# Patient Record
Sex: FEMALE | Race: WHITE | Employment: OTHER | URBAN - METROPOLITAN AREA
[De-identification: names, ages, dates, MRNs, and addresses within clinical notes are randomized per-mention and may not be internally consistent; named-entity substitution may affect disease eponyms.]

---

## 2018-03-09 PROBLEM — C34.92 ADENOCARCINOMA, LUNG, LEFT (HCC): Status: ACTIVE | Noted: 2018-03-09

## 2018-07-05 ENCOUNTER — HOSPITAL ENCOUNTER (OUTPATIENT)
Dept: ULTRASOUND IMAGING | Age: 78
Discharge: HOME | End: 2018-07-07
Payer: MEDICARE

## 2018-07-05 ENCOUNTER — TELEPHONE (OUTPATIENT)
Dept: FAMILY MEDICINE CLINIC | Age: 78
End: 2018-07-05

## 2018-07-05 ENCOUNTER — HOSPITAL ENCOUNTER (OUTPATIENT)
Age: 78
Discharge: HOME | End: 2018-07-07
Payer: MEDICARE

## 2018-07-05 DIAGNOSIS — M79.89 SWELLING OF LIMB: ICD-10-CM

## 2018-07-05 DIAGNOSIS — M79.89 LEFT LEG SWELLING: Primary | ICD-10-CM

## 2018-07-05 PROCEDURE — 93971 EXTREMITY STUDY: CPT

## 2018-07-06 ENCOUNTER — OFFICE VISIT (OUTPATIENT)
Dept: FAMILY MEDICINE CLINIC | Age: 78
End: 2018-07-06
Payer: MEDICARE

## 2018-07-06 VITALS
SYSTOLIC BLOOD PRESSURE: 108 MMHG | OXYGEN SATURATION: 96 % | RESPIRATION RATE: 16 BRPM | BODY MASS INDEX: 30.51 KG/M2 | TEMPERATURE: 97.8 F | DIASTOLIC BLOOD PRESSURE: 70 MMHG | HEIGHT: 63 IN | WEIGHT: 172.2 LBS | HEART RATE: 96 BPM

## 2018-07-06 DIAGNOSIS — R60.0 PEDAL EDEMA: Primary | ICD-10-CM

## 2018-07-06 DIAGNOSIS — R79.89 ELEVATED TSH: ICD-10-CM

## 2018-07-06 LAB
ALBUMIN SERPL-MCNC: NORMAL G/DL
ALP BLD-CCNC: NORMAL U/L
ALT SERPL-CCNC: NORMAL U/L
ANION GAP SERPL CALCULATED.3IONS-SCNC: NORMAL MMOL/L
AST SERPL-CCNC: NORMAL U/L
BASOPHILS ABSOLUTE: NORMAL /ΜL
BASOPHILS RELATIVE PERCENT: NORMAL %
BILIRUB SERPL-MCNC: NORMAL MG/DL (ref 0.1–1.4)
BUN BLDV-MCNC: NORMAL MG/DL
CALCIUM SERPL-MCNC: NORMAL MG/DL
CHLORIDE BLD-SCNC: NORMAL MMOL/L
CO2: NORMAL MMOL/L
CREAT SERPL-MCNC: NORMAL MG/DL
EOSINOPHILS ABSOLUTE: NORMAL /ΜL
EOSINOPHILS RELATIVE PERCENT: NORMAL %
GFR CALCULATED: NORMAL
GLUCOSE BLD-MCNC: 94 MG/DL
HCT VFR BLD CALC: 41.1 % (ref 36–46)
HEMOGLOBIN: 13.6 G/DL (ref 12–16)
LYMPHOCYTES ABSOLUTE: NORMAL /ΜL
LYMPHOCYTES RELATIVE PERCENT: NORMAL %
MCH RBC QN AUTO: NORMAL PG
MCHC RBC AUTO-ENTMCNC: NORMAL G/DL
MCV RBC AUTO: NORMAL FL
MONOCYTES ABSOLUTE: NORMAL /ΜL
MONOCYTES RELATIVE PERCENT: NORMAL %
NEUTROPHILS ABSOLUTE: NORMAL /ΜL
NEUTROPHILS RELATIVE PERCENT: NORMAL %
PDW BLD-RTO: NORMAL %
PLATELET # BLD: NORMAL K/ΜL
PMV BLD AUTO: NORMAL FL
POTASSIUM SERPL-SCNC: NORMAL MMOL/L
RBC # BLD: NORMAL 10^6/ΜL
SODIUM BLD-SCNC: NORMAL MMOL/L
TOTAL PROTEIN: NORMAL
TSH SERPL DL<=0.05 MIU/L-ACNC: 0.57 UIU/ML
WBC # BLD: NORMAL 10^3/ML

## 2018-07-06 PROCEDURE — 99213 OFFICE O/P EST LOW 20 MIN: CPT | Performed by: FAMILY MEDICINE

## 2018-07-06 RX ORDER — FUROSEMIDE 20 MG/1
20 TABLET ORAL DAILY
Qty: 5 TABLET | Refills: 0 | Status: SHIPPED | OUTPATIENT
Start: 2018-07-06 | End: 2018-09-24 | Stop reason: SDUPTHER

## 2018-07-06 NOTE — PROGRESS NOTES
7/8/2018    Chief Complaint   Patient presents with    Swelling     Pt having swelling in both lower legs/feet, especially Lt  x last few days, also swelling in Rt hand    Results     Discuss results of US of Lt lower extremity     Other     Requesting letter for renewal of Handicap Placard          Subjective    Has had increased swelling in legs L >R  Has us yesterday negative for  dvt    Has been eating differently in the past f ew mos  Went to dinner on sat  422 in [facility]. [address] with clam sauce  Balsamic,   Graduation party    Yoana Ibrahim doing ok off of it     Vitals:    07/06/18 1206   BP: 108/70   Pulse: 96   Resp: 16   Temp: 97.8 °F (36.6 °C)   SpO2: 96%       1. Pedal edema    - CBC Auto Differential; Future  - Comprehensive Metabolic Panel; Future  - TSH without Reflex; Future  - furosemide (LASIX) 20 MG tablet; Take 1 tablet by mouth daily For 3 days  Dispense: 5 tablet; Refill: 0  - Handicap Placard MISC; by Does not apply route Patient cannot walk 200 ft without stopping Duration: 5 years  Dispense: 1 each; Refill: 0    2. Elevated TSH    - TSH without Reflex;  Future          Goals      Keep scheduled appointments         Goals   Review Of Systems    General:  No weight change no malaise no fatigue no change in appetite no sleep disturbance no fever/chills no night sweats  Skin:                 no abnormal pigmentation, no rash, no scaling,no itching,no Masses,no  hair or nail changes  Eyes:               no blurring, no diplopia, no  eye pain no glaucoma no cataracts  ENT:                 no hearing loss,no  Tinnitus,no  Vertigo,no osebleed, no nasal congestion, no rhinorrhea,  no sore throat no jaw pain no hoarseness no bleeding  Gums   ___ dentures  Neck:                no node tenderness , not rigid, no masses   Respiratory:            no cough, no sputum,  No coughing blood, no pleuritic , no chest pain, no dyspnea,  no wheezing  Cardiovascular:         no angina,  No chest pain  No syncope  ++o pedal edema , no orthopnea, no PND, no palpitations, no claudication  Gastrointestinal  no nausea, no vomiting, no heartburn, no diarrhea, no constipation, no bloating,  no abdominal pain, no rectal pain, no bleeding no hemorrhoids, no hernia  Genitourinary:           no urinary urgency, no frequency, no dysuria, no nocturia, hesitancy, no  Incontinence, no bleeding, no stones  Musculoskeletal:         no arthritis, no  arthralgia, no myalgia,no  weakness,  no morning stiffness, no joint swelling  Neurologic:                no paralysis, no resis,no  paresthesia, no seizures,no  tremors,no headaches, no tumors , no stroke, no speech issues,  No incoordination, no head trauma, no memory loss/concentration  Hematologic:            no anemia, no abnormal bleeding/bruising, no fever, no chills,no night sweats, no wollen glands, no unexplained weight loss  Endocrine:        no heat or cold intolerance and no polyphagia, polydipsia,  or polyuria  Psych:            No depression no anxiety, no suicidal or homicidal thoughts, no irritability, no decreased energy, no trouble falling asleep, no early awakening , no trouble concentrating             Objective:    Family History   Problem Relation Age of Onset    Cancer Sister         pancreatic    Cancer Brother         prostate    Cancer Brother         pancreatic    Cancer Sister 68        breast       Past Medical History:   Diagnosis Date    Anesthesia     wakes up very slowly and has lasting drowsiness    Breast [facility] ([facility]     breast    COPD with exacerbation ([facility] [address] 11/14/2015    Glaucoma     History of colon polyps     Hyperlipidemia     Hypothyroidism     Osteoarthritis     Pneumonia        Social History     Social History    Marital status:       Spouse name: N/A    Number of children: N/A    Years of education: N/A     Occupational History    homemaker      Social History Main Topics    Smoking status: Former Smoker

## 2018-07-09 ENCOUNTER — TELEPHONE (OUTPATIENT)
Dept: FAMILY MEDICINE CLINIC | Age: 78
End: 2018-07-09

## 2018-07-09 NOTE — TELEPHONE ENCOUNTER
Reyes Fu called in with update on her leg. She said she took 3 water pills. Her weights were Saturday 171, Sunday 169 and today 168. She said swelling is completely down in am.  She said she went to store today and a little increase in swelling but not too bad. She has 2 Lasix left and you told her to call after she took 3 pills.

## 2018-07-09 NOTE — TELEPHONE ENCOUNTER
Spoke to Carlo Santiago and told her not to take any more Lasix and to watch salt intake and she understood

## 2018-07-16 ENCOUNTER — OFFICE VISIT (OUTPATIENT)
Dept: FAMILY MEDICINE CLINIC | Age: 78
End: 2018-07-16
Payer: MEDICARE

## 2018-07-16 ENCOUNTER — HOSPITAL ENCOUNTER (OUTPATIENT)
Dept: GENERAL RADIOLOGY | Age: 78
Discharge: HOME | End: 2018-07-18
Payer: MEDICARE

## 2018-07-16 ENCOUNTER — HOSPITAL ENCOUNTER (OUTPATIENT)
Age: 78
Discharge: HOME | End: 2018-07-18
Payer: MEDICARE

## 2018-07-16 VITALS
BODY MASS INDEX: 29.57 KG/M2 | HEIGHT: 64 IN | SYSTOLIC BLOOD PRESSURE: 134 MMHG | TEMPERATURE: 97.5 F | WEIGHT: 173.2 LBS | OXYGEN SATURATION: 98 % | HEART RATE: 92 BPM | DIASTOLIC BLOOD PRESSURE: 76 MMHG

## 2018-07-16 DIAGNOSIS — R60.0 PEDAL EDEMA: Primary | ICD-10-CM

## 2018-07-16 DIAGNOSIS — R60.0 PEDAL EDEMA: ICD-10-CM

## 2018-07-16 PROCEDURE — 99213 OFFICE O/P EST LOW 20 MIN: CPT | Performed by: FAMILY MEDICINE

## 2018-07-16 PROCEDURE — 71046 X-RAY EXAM CHEST 2 VIEWS: CPT

## 2018-07-16 RX ORDER — FUROSEMIDE 20 MG/1
20 TABLET ORAL 2 TIMES DAILY
Qty: 60 TABLET | Refills: 3 | Status: SHIPPED | OUTPATIENT
Start: 2018-07-16 | End: 2019-05-20

## 2018-07-16 NOTE — PROGRESS NOTES
no bleeding, no stones  Musculoskeletal:         no arthritis, no  arthralgia, no myalgia,no  weakness,  no morning stiffness, no joint swelling  Neurologic:                no paralysis, no resis,no  paresthesia, no seizures,no  tremors,no headaches, no tumors , no stroke, no speech issues,  No incoordination, no head trauma, no memory loss/concentration  Hematologic:            no anemia, no abnormal bleeding/bruising, no fever, no chills,no night sweats, no wollen glands, no unexplained weight loss  Endocrine:        no heat or cold intolerance and no polyphagia, polydipsia,  or polyuria  Psych:            No depression no anxiety, no suicidal or homicidal thoughts, no irritability, no decreased energy, no trouble falling asleep, no early awakening , no trouble concentrating             Objective:    Family History   Problem Relation Age of Onset    Cancer Sister         pancreatic    Cancer Brother         prostate    Cancer Brother         pancreatic    Cancer Sister 68        breast       Past Medical History:   Diagnosis Date    Anesthesia     wakes up very slowly and has lasting drowsiness    Breast cancer ([facility]     breast    COPD with exacerbation ([facility] 11/14/2015    Glaucoma     History of colon polyps     Hyperlipidemia     Hypothyroidism     Osteoarthritis     Pneumonia        Social History     Social History    Marital status:       Spouse name: N/A    Number of children: N/A    Years of education: N/A     Occupational History    homemaker      Social History Main Topics    Smoking status: Former Smoker     Packs/day: 0.50     Years: 45.00     Types: Cigarettes     Quit date: 1/1/2005    Smokeless tobacco: Never Used    Alcohol use No      Comment: socially    Drug use: No    Sexual activity: Not Currently     Other Topics Concern    Not on file     Social History Narrative    No narrative on file           Scheduled Meds:  Current Outpatient Prescriptions   Medication Sig Dispense Refill    furosemide (LASIX) 20 MG tablet Take 1 tablet by mouth 2 times daily 60 tablet 3    Handicap Placard MISC by Does not apply route Patient cannot walk 200 ft without stopping Duration: 5 years 1 each 0    omeprazole (PRILOSEC) 20 MG delayed release capsule Take 20 mg by mouth daily      simvastatin (ZOCOR) 40 MG tablet Take 1 tablet by mouth nightly 90 tablet 1    levothyroxine (SYNTHROID) 125 MCG tablet Take 1 tablet by mouth Daily 90 tablet 1    brimonidine (ALPHAGAN) 0.2 % ophthalmic solution Place 1 drop into the right eye 2 times daily       latanoprost (XALATAN) 0.005 % ophthalmic solution Place 1 drop into the right eye nightly       furosemide (LASIX) 20 MG tablet Take 1 tablet by mouth daily For 3 days 5 tablet 0    doxycycline hyclate (VIBRAMYCIN) 100 MG capsule TAKE ONE CAPSULE BY MOUTH WITH WATER TWO TIMES A DAY  0    mometasone-formoterol (DULERA) 200-5 MCG/ACT inhaler Inhale 2 puffs into the lungs 2 times daily 3 Inhaler 1    albuterol sulfate HFA (PROAIR HFA) 108 (90 Base) MCG/ACT inhaler Inhale 2 puffs into the lungs every 6 hours as needed for Wheezing 1 Inhaler 5    dorzolamide (TRUSOPT) 2 % ophthalmic solution Use 1 Drop in the right eye twice daily. No current facility-administered medications for this visit. Wt Readings from Last 3 Encounters:   07/16/18 173 lb 3.2 oz (78.6 kg)   07/06/18 172 lb 3.2 oz (78.1 kg)   03/09/18 166 lb (75.3 kg)         Vitals:    07/16/18 1426   BP: 134/76   Pulse: 92   Temp: 97.5 °F (36.4 °C)   SpO2: 98%       Physical Exam:    General: No obesity, no cachexia  Skin:    Warm and dry. Not pale, not flushed , no lesions, No rash , no bruises  HEENT:   PERRLA, EOMI. Conjunctiva clear, no  Drainage, no jaundice, ext canals normal ,no cerumen impaction,TM's normal ,not injected ,no fluid . Normal nasal mucosa not boggy , not inflamed .  Septum normal, turbinates not swollen, oral mucosa moist , lips normal, teeth normal,

## 2018-07-17 DIAGNOSIS — E78.2 MIXED HYPERLIPIDEMIA: ICD-10-CM

## 2018-07-17 DIAGNOSIS — E03.9 ACQUIRED HYPOTHYROIDISM: ICD-10-CM

## 2018-07-20 ENCOUNTER — HOSPITAL ENCOUNTER (OUTPATIENT)
Dept: CARDIOLOGY | Age: 78
Discharge: HOME | End: 2018-07-20
Payer: MEDICARE

## 2018-07-20 DIAGNOSIS — R60.0 PEDAL EDEMA: ICD-10-CM

## 2018-07-20 LAB
LV EF: 60 %
LVEF MODALITY: NORMAL

## 2018-07-20 PROCEDURE — 93306 TTE W/DOPPLER COMPLETE: CPT | Performed by: PSYCHIATRY & NEUROLOGY

## 2018-07-23 RX ORDER — ALBUTEROL SULFATE 90 UG/1
2 AEROSOL, METERED RESPIRATORY (INHALATION) EVERY 6 HOURS PRN
Qty: 1 INHALER | Refills: 5
Start: 2018-07-23 | End: 2019-05-20 | Stop reason: SDUPTHER

## 2018-07-27 ENCOUNTER — TELEPHONE (OUTPATIENT)
Dept: FAMILY MEDICINE CLINIC | Age: 78
End: 2018-07-27

## 2018-07-27 NOTE — TELEPHONE ENCOUNTER
Delio Brooks called with update: Legs are better when she does what you told her to - elevate, wear compression hose, alow salt. Really good in morning, by end of night swell again but goes down when she puts them up. Color is still a little pink but better than they were.

## 2018-08-10 ENCOUNTER — NURSE ONLY (OUTPATIENT)
Dept: FAMILY MEDICINE CLINIC | Age: 78
End: 2018-08-10
Payer: MEDICARE

## 2018-08-10 VITALS
HEART RATE: 90 BPM | BODY MASS INDEX: 29.89 KG/M2 | SYSTOLIC BLOOD PRESSURE: 102 MMHG | OXYGEN SATURATION: 97 % | DIASTOLIC BLOOD PRESSURE: 60 MMHG | WEIGHT: 171.4 LBS | TEMPERATURE: 98 F

## 2018-08-10 DIAGNOSIS — E03.9 ACQUIRED HYPOTHYROIDISM: ICD-10-CM

## 2018-08-10 DIAGNOSIS — R60.0 PEDAL EDEMA: Primary | ICD-10-CM

## 2018-08-10 DIAGNOSIS — I73.00 RAYNAUD'S PHENOMENON WITHOUT GANGRENE: ICD-10-CM

## 2018-08-10 DIAGNOSIS — R06.02 SOB (SHORTNESS OF BREATH): ICD-10-CM

## 2018-08-10 PROCEDURE — 99213 OFFICE O/P EST LOW 20 MIN: CPT | Performed by: FAMILY MEDICINE

## 2018-08-10 RX ORDER — LEVOTHYROXINE SODIUM 0.12 MG/1
125 TABLET ORAL DAILY
Qty: 90 TABLET | Refills: 1 | Status: SHIPPED | OUTPATIENT
Start: 2018-08-10 | End: 2019-01-30 | Stop reason: SDUPTHER

## 2018-08-13 LAB — B-TYPE NATRIURETIC PEPTIDE: 31 PG/ML

## 2018-08-16 DIAGNOSIS — R06.02 SOB (SHORTNESS OF BREATH): ICD-10-CM

## 2018-08-16 DIAGNOSIS — R60.0 PEDAL EDEMA: ICD-10-CM

## 2018-08-20 NOTE — PROGRESS NOTES
 Marital status:      Spouse name: N/A    Number of children: N/A    Years of education: N/A     Occupational History    homemaker      Social History Main Topics    Smoking status: Former Smoker     Packs/day: 0.50     Years: 45.00     Types: Cigarettes     Quit date: 1/1/2005    Smokeless tobacco: Never Used    Alcohol use No      Comment: socially    Drug use: No    Sexual activity: Not Currently     Other Topics Concern    Not on file     Social History Narrative    No narrative on file           Scheduled Meds:  Current Outpatient Prescriptions   Medication Sig Dispense Refill    levothyroxine (SYNTHROID) 125 MCG tablet Take 1 tablet by mouth Daily 90 tablet 1    albuterol sulfate HFA (PROAIR HFA) 108 (90 Base) MCG/ACT inhaler Inhale 2 puffs into the lungs every 6 hours as needed for Wheezing 1 Inhaler 5    furosemide (LASIX) 20 MG tablet Take 1 tablet by mouth 2 times daily 60 tablet 3    furosemide (LASIX) 20 MG tablet Take 1 tablet by mouth daily For 3 days 5 tablet 0    Handicap Placard MISC by Does not apply route Patient cannot walk 200 ft without stopping Duration: 5 years 1 each 0    omeprazole (PRILOSEC) 20 MG delayed release capsule Take 20 mg by mouth daily      simvastatin (ZOCOR) 40 MG tablet Take 1 tablet by mouth nightly 90 tablet 1    doxycycline hyclate (VIBRAMYCIN) 100 MG capsule TAKE ONE CAPSULE BY MOUTH WITH WATER TWO TIMES A DAY  0    dorzolamide (TRUSOPT) 2 % ophthalmic solution Use 1 Drop in the right eye twice daily.  brimonidine (ALPHAGAN) 0.2 % ophthalmic solution Place 1 drop into the right eye 2 times daily       latanoprost (XALATAN) 0.005 % ophthalmic solution Place 1 drop into the right eye nightly        No current facility-administered medications for this visit.                 Wt Readings from Last 3 Encounters:   08/10/18 171 lb 6.4 oz (77.7 kg)   07/16/18 173 lb 3.2 oz (78.6 kg)   07/06/18 172 lb 3.2 oz (78.1 kg)         Vitals:    08/10/18

## 2018-09-11 LAB
ALBUMIN SERPL-MCNC: NORMAL G/DL
ALP BLD-CCNC: NORMAL U/L
ALT SERPL-CCNC: NORMAL U/L
ANION GAP SERPL CALCULATED.3IONS-SCNC: NORMAL MMOL/L
AST SERPL-CCNC: NORMAL U/L
BASOPHILS ABSOLUTE: NORMAL /ΜL
BASOPHILS RELATIVE PERCENT: NORMAL %
BILIRUB SERPL-MCNC: NORMAL MG/DL (ref 0.1–1.4)
BUN BLDV-MCNC: NORMAL MG/DL
CALCIUM SERPL-MCNC: NORMAL MG/DL
CHLORIDE BLD-SCNC: NORMAL MMOL/L
CHOLESTEROL, TOTAL: 160 MG/DL
CHOLESTEROL/HDL RATIO: 3.5
CO2: NORMAL MMOL/L
CREAT SERPL-MCNC: NORMAL MG/DL
EOSINOPHILS ABSOLUTE: NORMAL /ΜL
EOSINOPHILS RELATIVE PERCENT: NORMAL %
GFR CALCULATED: NORMAL
GLUCOSE BLD-MCNC: 104 MG/DL
HCT VFR BLD CALC: 43.4 % (ref 36–46)
HDLC SERPL-MCNC: 46 MG/DL (ref 35–70)
HEMOGLOBIN: 14 G/DL (ref 12–16)
LDL CHOLESTEROL CALCULATED: 79 MG/DL (ref 0–160)
LYMPHOCYTES ABSOLUTE: NORMAL /ΜL
LYMPHOCYTES RELATIVE PERCENT: NORMAL %
MCH RBC QN AUTO: NORMAL PG
MCHC RBC AUTO-ENTMCNC: NORMAL G/DL
MCV RBC AUTO: NORMAL FL
MONOCYTES ABSOLUTE: NORMAL /ΜL
MONOCYTES RELATIVE PERCENT: NORMAL %
NEUTROPHILS ABSOLUTE: NORMAL /ΜL
NEUTROPHILS RELATIVE PERCENT: NORMAL %
PDW BLD-RTO: NORMAL %
PLATELET # BLD: NORMAL K/ΜL
PMV BLD AUTO: NORMAL FL
POTASSIUM SERPL-SCNC: NORMAL MMOL/L
RBC # BLD: NORMAL 10^6/ΜL
SODIUM BLD-SCNC: NORMAL MMOL/L
TOTAL PROTEIN: NORMAL
TRIGL SERPL-MCNC: 173 MG/DL
TSH SERPL DL<=0.05 MIU/L-ACNC: 0.9 UIU/ML
VLDLC SERPL CALC-MCNC: NORMAL MG/DL
WBC # BLD: NORMAL 10^3/ML

## 2018-09-17 ENCOUNTER — OFFICE VISIT (OUTPATIENT)
Dept: VASCULAR SURGERY | Age: 78
End: 2018-09-17
Payer: MEDICARE

## 2018-09-17 DIAGNOSIS — I87.2 VENOUS INSUFFICIENCY OF BOTH LOWER EXTREMITIES: Primary | ICD-10-CM

## 2018-09-17 PROCEDURE — 99204 OFFICE O/P NEW MOD 45 MIN: CPT | Performed by: SURGERY

## 2018-09-17 NOTE — PROGRESS NOTES
Vascular Surgery Outpatient Consultation    Reason for Consult:  LE Edema    PCP : Avlina Bazzi MD  Podiatrist :     HISTORY OF PRESENT ILLNESS:    The patient is a [de-identified] y.o. who presents with complaints of LE edema sine 7/2018. It has been progressively getting worse. The symptoms are left greater than right. Symptoms include pain, aching, fatigue and edema typically worse as on feet more, at end of the day. Pain at its worst is a 4/10. She has taken alleve which does not help much. The patient has been using compression stockings - knee high - for a couple weeks per her pcp. She had issues with swelling just above her stockings so she stopped wearing as much. She has no previos hx of DVT and had recent us of L LE which had no evidence of DVT. She is taking lasix and has noticed some improvement. She has a hx of lung ca and lung resection left chest at [facility]. She also has hx of breast ca.        ROS : All others Negative if blank [], Positive if [x]  General Urinary   [] Fevers [] Hematuria   [] Chills [] Dysuria   [] Weight Loss Vascular   Skin [] Claudication   [] Tissue Loss [] Rest Pain   Eyes Neurologic   [x] Wears Glasses/Contacts [] Stroke/TIA   [] Vision Changes [] Focal weakness   Respiratory [] Slurred Speech    [] Shortness of breath ENT   Cardiovascular [] Difficulty swallowing   [] Chest Pain Endocrine    [x] Shortness of breath with exertion [] Increased Thirst   Gastrointestinal    [] Abdominal Pain    [] Melena   [] Hematochezia         Past Medical History:        Diagnosis Date    Anesthesia     wakes up very slowly and has lasting drowsiness    Breast cancer ([address]     breast    COPD with exacerbation ([address] 11/14/2015    Glaucoma     History of colon polyps     Hyperlipidemia     Hypothyroidism     Osteoarthritis     Pneumonia      Past Surgical History:        Procedure Laterality Date    APPENDECTOMY      BREAST LUMPECTOMY  9/25/2012    Right    BREAST SURGERY Sexual activity: Not Currently     Other Topics Concern    Not on file     Social History Narrative    No narrative on file        Family History   Problem Relation Age of Onset    Cancer Sister         pancreatic    Cancer Brother         prostate    Cancer Brother         pancreatic    Cancer Sister 68        breast   Mom had varicose veins    Labs  Lab Results   Component Value Date    WBC 5.2 08/30/2016    HGB 13.6 07/06/2018    HCT 41.1 07/06/2018     08/30/2016    PROTIME 11.4 08/30/2016    INR 1.1 08/30/2016    APTT 30.2 11/13/2015    K 4.8 04/02/2016    BUN 14 04/02/2016    CREATININE 0.6 04/02/2016     PHYSICAL EXAM:    CONSTITUTIONAL:   Awake, alert, cooperative  PSYCHIATRIC :  Oriented to time, place and person     Appropriate insight to disease process  EYES: Lids and lashes normal  ENT:  External ears and nose without lesions   Hearing deficits on right  NECK: Supple, symmetrical, trachea midline   Thyroid goiter not appreciated   Carotid bruit not noted  LUNGS:  No increased work of breathing                 Clear to auscultation  CARDIOVASCULAR:  regular rate and rhythm   ABDOMEN:  soft, non-distended, non-tender   Hernias not noted   Aorta is not palpable  Lymphatics : Cervical lymphadenopathy not noted     Femoral lymphadenopathy notnoted  SKIN:   Normal skin color   Texture and turgor normal, no induration  EXTREMITIES:   R UE 5/5 strength   No cyanosis noted in nail beds  L UE 5/5 strength   No cyanosis noted in nail beds  R LE Edema present   Varicose veins absent   L LE Edema present   Varicose veins absent   R femoral 2+ L femoral 2+   R dorsalis pedis 2+ L dorsalis pedis 2+   R posterior tibial 1+ L posterior tibial 1+     RADIOLOGY:    A/PSymptomatic Venous Insufficiency of L > R LE   · Etiology is likely associated with venous reflux  · I explained to them the pathophysiology of venous reflux and the symptoms associated with it including swelling, pain, edema, heaviness, and even ulceration  · Elevate Bilateral LE while in bed or sitting  · Compression stockings pantyhose high 20-30 mm hg wear daily - Script given  · Discussed how this is the first line of therapy  · They understand even if surgical intervention was felt to be appropriate in the future they would need to wear compression stockings lifetime  · F/U as outpatient in 2 months  · Call if patient develops worsening of swelling or wounds  · All ?s answered    Kasey Artis

## 2018-09-17 NOTE — PATIENT INSTRUCTIONS
stopped after 15 minutes, apply pressure again for another 15 minutes. You can repeat this up to 3 times for a total of 45 minutes. If you have a blood clot in a varicose vein, you may have tenderness and swelling over the vein. The vein may feel firm. Be sure to call your doctor right away if you have these symptoms. If your doctor has told you how to care for the clot, follow his or her instructions. Care may include the following:  · Prop up your leg and apply heat with a warm, damp cloth or a heating pad set on low (put a towel or cloth between your leg and the heating pad to prevent burns). · Ask your doctor if you can take an over-the-counter pain medicine, such as acetaminophen (Tylenol), ibuprofen (Advil, Motrin), or naproxen (Aleve). Be safe with medicines. Read and follow all instructions on the label. When should you call for help? Call 911 anytime you think you may need emergency care. For example, call if:    · You have sudden chest pain and shortness of breath, or you cough up blood.    Call your doctor now or seek immediate medical care if:    · You have signs of a blood clot, such as:  ¨ Pain in your calf, back of the knee, thigh, or groin. ¨ Redness and swelling in your leg or groin.     · A varicose vein begins to bleed and you cannot stop it.     · You have a tender lump in your leg.     · You get an open sore.    Watch closely for changes in your health, and be sure to contact your doctor if:    · Your varicose vein symptoms do not improve with home treatment. Where can you learn more? Go to https://Netli.[employer]row[employer]ube. org and sign in to your [employer] account. Enter W473 in the [employer] box to learn more about \"Varicose Veins: Care Instructions. \"     If you do not have an account, please click on the \"Sign Up Now\" link. Current as of: November 21, 2017  Content Version: 11.7  © 2606-4494 [employer].  Care instructions adapted under license by [facility]. If you have questions about a medical condition or this instruction, always ask your healthcare professional. [place] [address] any warranty or liability for your use of this information.

## 2018-09-24 ENCOUNTER — OFFICE VISIT (OUTPATIENT)
Dept: FAMILY MEDICINE CLINIC | Age: 78
End: 2018-09-24
Payer: MEDICARE

## 2018-09-24 VITALS
HEART RATE: 100 BPM | OXYGEN SATURATION: 97 % | DIASTOLIC BLOOD PRESSURE: 68 MMHG | SYSTOLIC BLOOD PRESSURE: 120 MMHG | TEMPERATURE: 97.9 F | WEIGHT: 170.2 LBS | RESPIRATION RATE: 16 BRPM | BODY MASS INDEX: 29.06 KG/M2 | HEIGHT: 64 IN

## 2018-09-24 DIAGNOSIS — Z23 FLU VACCINE NEED: Primary | ICD-10-CM

## 2018-09-24 DIAGNOSIS — E78.2 MIXED HYPERLIPIDEMIA: ICD-10-CM

## 2018-09-24 DIAGNOSIS — Z85.3 PERSONAL HISTORY OF BREAST CANCER: ICD-10-CM

## 2018-09-24 DIAGNOSIS — Z23 NEED FOR PROPHYLACTIC VACCINATION AND INOCULATION AGAINST VARICELLA: ICD-10-CM

## 2018-09-24 DIAGNOSIS — H40.9 GLAUCOMA OF BOTH EYES, UNSPECIFIED GLAUCOMA TYPE: ICD-10-CM

## 2018-09-24 DIAGNOSIS — Z00.00 ROUTINE GENERAL MEDICAL EXAMINATION AT A HEALTH CARE FACILITY: ICD-10-CM

## 2018-09-24 DIAGNOSIS — E03.9 ACQUIRED HYPOTHYROIDISM: ICD-10-CM

## 2018-09-24 DIAGNOSIS — C34.92 ADENOCARCINOMA, LUNG, LEFT (HCC): ICD-10-CM

## 2018-09-24 PROCEDURE — G0008 ADMIN INFLUENZA VIRUS VAC: HCPCS | Performed by: FAMILY MEDICINE

## 2018-09-24 PROCEDURE — G0439 PPPS, SUBSEQ VISIT: HCPCS | Performed by: FAMILY MEDICINE

## 2018-09-24 PROCEDURE — 90662 IIV NO PRSV INCREASED AG IM: CPT | Performed by: FAMILY MEDICINE

## 2018-09-24 RX ORDER — OMEPRAZOLE 20 MG/1
40 CAPSULE, DELAYED RELEASE ORAL DAILY
COMMUNITY

## 2018-09-24 RX ORDER — SIMVASTATIN 40 MG
40 TABLET ORAL NIGHTLY
Qty: 90 TABLET | Refills: 1 | Status: SHIPPED | OUTPATIENT
Start: 2018-09-24 | End: 2019-03-25 | Stop reason: SDUPTHER

## 2018-09-24 ASSESSMENT — LIFESTYLE VARIABLES
AUDIT-C TOTAL SCORE: 1
HAS A RELATIVE, FRIEND, DOCTOR, OR ANOTHER HEALTH PROFESSIONAL EXPRESSED CONCERN ABOUT YOUR DRINKING OR SUGGESTED YOU CUT DOWN: 0
HOW MANY STANDARD DRINKS CONTAINING ALCOHOL DO YOU HAVE ON A TYPICAL DAY: 0
HOW OFTEN DO YOU HAVE SIX OR MORE DRINKS ON ONE OCCASION: 0
AUDIT TOTAL SCORE: 1
HOW OFTEN DURING THE LAST YEAR HAVE YOU HAD A FEELING OF GUILT OR REMORSE AFTER DRINKING: 0
HOW OFTEN DURING THE LAST YEAR HAVE YOU FAILED TO DO WHAT WAS NORMALLY EXPECTED FROM YOU BECAUSE OF DRINKING: 0
HOW OFTEN DURING THE LAST YEAR HAVE YOU NEEDED AN ALCOHOLIC DRINK FIRST THING IN THE MORNING TO GET YOURSELF GOING AFTER A NIGHT OF HEAVY DRINKING: 0
HAVE YOU OR SOMEONE ELSE BEEN INJURED AS A RESULT OF YOUR DRINKING: 0
HOW OFTEN DO YOU HAVE A DRINK CONTAINING ALCOHOL: 1
HOW OFTEN DURING THE LAST YEAR HAVE YOU BEEN UNABLE TO REMEMBER WHAT HAPPENED THE NIGHT BEFORE BECAUSE YOU HAD BEEN DRINKING: 0
HOW OFTEN DURING THE LAST YEAR HAVE YOU FOUND THAT YOU WERE NOT ABLE TO STOP DRINKING ONCE YOU HAD STARTED: 0

## 2018-09-24 ASSESSMENT — PATIENT HEALTH QUESTIONNAIRE - PHQ9
SUM OF ALL RESPONSES TO PHQ QUESTIONS 1-9: 0
SUM OF ALL RESPONSES TO PHQ QUESTIONS 1-9: 0
SUM OF ALL RESPONSES TO PHQ9 QUESTIONS 1 & 2: 0
2. FEELING DOWN, DEPRESSED OR HOPELESS: 0
SUM OF ALL RESPONSES TO PHQ QUESTIONS 1-9: 0
SUM OF ALL RESPONSES TO PHQ QUESTIONS 1-9: 0
1. LITTLE INTEREST OR PLEASURE IN DOING THINGS: 0

## 2018-09-24 ASSESSMENT — ANXIETY QUESTIONNAIRES: GAD7 TOTAL SCORE: 0

## 2018-09-24 NOTE — PROGRESS NOTES
Medicare Annual Wellness Visit  Name: Steffanie  Date: 2018   MRN: [id number] Sex: Female   Age: [de-identified] y.o. Ethnicity: Non-/Non    : 1940 Race: Artur Beal is here for Medicare AWV (Annual Medicare check); Hyperlipidemia (6 month check-up ); Medication Refill; Discuss Labs (Has copies); and Flu Vaccine (Requesting flu vaccine today)    Screenings for behavioral, psychosocial and functional/safety risks, and cognitive dysfunction are all negative except as indicated below. These results, as well as other patient data from the [address] form, are documented in Flowsheets linked to this Encounter. Allergies   Allergen Reactions    Beta Adrenergic Blockers Shortness Of Breath     Severe wheezing with timolol    Timolol Maleate Shortness Of Breath    Aspirin      bruising    Pcn [Penicillins] Hives     Prior to Visit Medications    Medication Sig Taking? Authorizing Provider   omeprazole (PRILOSEC) 20 MG delayed release capsule Take 20 mg by mouth daily Yes Historical Provider, MD   simvastatin (ZOCOR) 40 MG tablet Take 1 tablet by mouth nightly Yes Evgeny Arevalo MD   Elastic Bandages & Supports (JOBST KNEE HIGH COMPRESSION SM) MISC Compression stockings 20-30 mm hg pantyhose high bilaterally  Dx :  Venous Insufficiency, Swelling Yes Danay Nails MD   levothyroxine (SYNTHROID) 125 MCG tablet Take 1 tablet by mouth Daily  Patient taking differently: Take 112 mcg by mouth Daily  Yes Evgeny Arevalo MD   furosemide (LASIX) 20 MG tablet Take 1 tablet by mouth 2 times daily  Patient taking differently: Take 20 mg by mouth daily  Yes Evgeny Arevalo MD   Handicap Placard MISC by Does not apply route Patient cannot walk 200 ft without stopping Duration: 5 years Yes Evgeny Arevalo MD   brimonidine (ALPHAGAN) 0.2 % ophthalmic solution Place 1 drop into the right eye 2 times daily  Yes Historical Provider, MD   latanoprost (XALATAN) 0.005 % ophthalmic solution Place 1 drop into the right eye nightly  Yes Historical Provider, MD   albuterol sulfate HFA (PROAIR HFA) 108 (90 Base) MCG/ACT inhaler Inhale 2 puffs into the lungs every 6 hours as needed for Wheezing  Daisy Juarez MD     Past Medical History:   Diagnosis Date    Anesthesia     wakes up very slowly and has lasting drowsiness    Breast cancer ([address]     breast    COPD with exacerbation ([address] 11/14/2015    Glaucoma     History of colon polyps     Hyperlipidemia     Hypothyroidism     Osteoarthritis     Pneumonia      Past Surgical History:   Procedure Laterality Date    APPENDECTOMY      BREAST LUMPECTOMY  9/25/2012    Right    BREAST SURGERY      left breast > benign 40 + years ago    COLONOSCOPY  8/21/2013    COLONOSCOPY  [id number]    EYE SURGERY      HYSTERECTOMY      CARMELITA    OTHER SURGICAL HISTORY  11/1/12    r needle localized axillary sentinel lymph node exision     Family History   Problem Relation Age of Onset    Cancer Sister         pancreatic    Cancer Brother         prostate    Cancer Brother         pancreatic    Cancer Sister 68        breast       CareTeam (Including outside providers/suppliers regularly involved in providing care):   Patient Care Team:  Daisy Juarez MD as PCP - General (Family Medicine)  Daisy Juarez MD as PCP - S Attributed Provider  Sandy Rinaldi MD as Consulting Physician (Hematology and Oncology)    Wt Readings from Last 3 Encounters:   09/24/18 170 lb 3.2 oz (77.2 kg)   08/10/18 171 lb 6.4 oz (77.7 kg)   07/16/18 173 lb 3.2 oz (78.6 kg)     Vitals:    09/24/18 1005   BP: 120/68   Pulse: 100   Resp: 16   Temp: 97.9 °F (36.6 °C)   SpO2: 97%   Weight: 170 lb 3.2 oz (77.2 kg)   Height: 5' 3.5\" (1.613 m)     Body mass index is 29.68 kg/m².     General Appearance: alert and oriented to person, place and time, well developed and well- nourished, in no acute distress  Skin: warm and dry, no rash or erythema  Head: normocephalic Preventive Plan   Current Health Maintenance Status  Immunization History   Administered Date(s) Administered    Influenza, High Dose (Fluzone 65 yrs and older) 10/03/2014, 10/07/2015, 10/04/2016, 09/11/2017, 09/24/2018    Pneumococcal 13-valent Conjugate ([id number]) 10/07/2015    Pneumococcal Polysaccharide ([id number]) 10/08/2007        Health Maintenance   Topic Date Due    DTaP/Tdap/Td vaccine (1 - Tdap) 06[birth year]19/1957    Shingles Vaccine (1 of 2 - 2 Dose Series) 06/19/1988    TSH testing  07/06/2019    Potassium monitoring  07/06/2019    Creatinine monitoring  07/06/2019    DEXA (modify frequency per FRAX score)  Completed    Flu vaccine  Completed    Pneumococcal low/med risk  Completed     Recommendations for Preventive Services Due: see orders and patient instructions/AVS.  . Recommended screening schedule for the next 5-10 years is provided to the patient in written form: see Patient Instructions/AVS.      1. Mixed hyperlipidemia  *  Component Value Ref Range & Units Status Collected Lab   Cholesterol, Total 190  mg/dL Final 02/23/2018 12:00 AM Unknown   HDL 61  35 - 70 mg/dL Final 02/23/2018 12:00 AM Unknown   LDL Calculated 102  0 - 160 mg/dL Final 02/23/2018 12:00 AM Unknown   Triglycerides 133  mg/dL Final 02/23/2018 12:00 AM Unknown   Chol/HDL Ratio 3.1   Final 02/23/2018 12:00 AM Unknown   VLDL            - simvastatin (ZOCOR) 40 MG tablet; Take 1 tablet by mouth nightly  Dispense: 90 tablet; Refill: 1    2. Flu vaccine need    - INFLUENZA, HIGH DOSE, 65 YRS +, IM, PF, PREFILL SYR, 0.5ML (FLUZONE HD)    3. Adenocarcinoma, lung, left ([address]      4. Personal history of breast cancer      5. Acquired hypothyroidism      6.  Glaucoma of both eyes, unspecified glaucoma type

## 2018-09-24 NOTE — PATIENT INSTRUCTIONS
Personalized Preventive Plan for Becky Pae - 9/24/2018  Medicare offers a range of preventive health benefits. Some of the tests and screenings are paid in full while other may be subject to a deductible, co-insurance, and/or copay. Some of these benefits include a comprehensive review of your medical history including lifestyle, illnesses that may run in your family, and various assessments and screenings as appropriate. After reviewing your medical record and screening and assessments performed today your provider may have ordered immunizations, labs, imaging, and/or referrals for you. A list of these orders (if applicable) as well as your Preventive Care list are included within your After Visit Summary for your review. Other Preventive Recommendations:    · A preventive eye exam performed by an eye specialist is recommended every 1-2 years to screen for glaucoma; cataracts, macular degeneration, and other eye disorders. · A preventive dental visit is recommended every 6 months. · Try to get at least 150 minutes of exercise per week or 10,000 steps per day on a pedometer . · Order or download the FREE \"Exercise & Physical Activity: Your Everyday Guide\" from [employer]. Call 8-931.147.6387 or search [employer] online. · You need 0406-9304 mg of calcium and 2772-9502 IU of vitamin D per day. It is possible to meet your calcium requirement with diet alone, but a vitamin D supplement is usually necessary to meet this goal.  · When exposed to the sun, use a sunscreen that protects against both UVA and UVB radiation with an SPF of 30 or greater. Reapply every 2 to 3 hours or after sweating, drying off with a towel, or swimming. · Always wear a seat belt when traveling in a car. Always wear a helmet when riding a bicycle or motorcycle.

## 2018-10-05 DIAGNOSIS — E78.2 MIXED HYPERLIPIDEMIA: ICD-10-CM

## 2018-10-05 DIAGNOSIS — E03.9 ACQUIRED HYPOTHYROIDISM: ICD-10-CM

## 2018-11-12 ENCOUNTER — TELEPHONE (OUTPATIENT)
Dept: FAMILY MEDICINE CLINIC | Age: 78
End: 2018-11-12

## 2018-11-27 ENCOUNTER — TELEPHONE (OUTPATIENT)
Dept: FAMILY MEDICINE CLINIC | Age: 78
End: 2018-11-27

## 2018-11-27 DIAGNOSIS — Z12.39 SCREENING BREAST EXAMINATION: Primary | ICD-10-CM

## 2018-12-03 ENCOUNTER — HOSPITAL ENCOUNTER (OUTPATIENT)
Dept: GENERAL RADIOLOGY | Age: 78
Discharge: HOME | End: 2018-12-05
Payer: MEDICARE

## 2018-12-03 DIAGNOSIS — Z12.39 SCREENING BREAST EXAMINATION: ICD-10-CM

## 2018-12-03 PROCEDURE — 77063 BREAST TOMOSYNTHESIS BI: CPT

## 2019-01-30 DIAGNOSIS — E03.9 ACQUIRED HYPOTHYROIDISM: ICD-10-CM

## 2019-01-30 RX ORDER — LEVOTHYROXINE SODIUM 0.12 MG/1
125 TABLET ORAL DAILY
Qty: 90 TABLET | Refills: 1 | Status: SHIPPED | OUTPATIENT
Start: 2019-01-30 | End: 2019-03-25 | Stop reason: DRUGHIGH

## 2019-03-14 LAB
ALBUMIN SERPL-MCNC: NORMAL G/DL
ALP BLD-CCNC: NORMAL U/L
ALT SERPL-CCNC: NORMAL U/L
ANION GAP SERPL CALCULATED.3IONS-SCNC: NORMAL MMOL/L
AST SERPL-CCNC: NORMAL U/L
BASOPHILS ABSOLUTE: NORMAL /ΜL
BASOPHILS RELATIVE PERCENT: NORMAL %
BILIRUB SERPL-MCNC: NORMAL MG/DL (ref 0.1–1.4)
BUN BLDV-MCNC: NORMAL MG/DL
CALCIUM SERPL-MCNC: NORMAL MG/DL
CHLORIDE BLD-SCNC: NORMAL MMOL/L
CHOLESTEROL, TOTAL: 159 MG/DL
CHOLESTEROL/HDL RATIO: 3.4
CO2: NORMAL MMOL/L
CREAT SERPL-MCNC: NORMAL MG/DL
EOSINOPHILS ABSOLUTE: NORMAL /ΜL
EOSINOPHILS RELATIVE PERCENT: NORMAL %
GFR CALCULATED: NORMAL
GLUCOSE BLD-MCNC: 94 MG/DL
HCT VFR BLD CALC: 44.3 % (ref 36–46)
HDLC SERPL-MCNC: 47 MG/DL (ref 35–70)
HEMOGLOBIN: 14.2 G/DL (ref 12–16)
LDL CHOLESTEROL CALCULATED: 86 MG/DL (ref 0–160)
LYMPHOCYTES ABSOLUTE: NORMAL /ΜL
LYMPHOCYTES RELATIVE PERCENT: NORMAL %
MCH RBC QN AUTO: NORMAL PG
MCHC RBC AUTO-ENTMCNC: NORMAL G/DL
MCV RBC AUTO: NORMAL FL
MONOCYTES ABSOLUTE: NORMAL /ΜL
MONOCYTES RELATIVE PERCENT: NORMAL %
NEUTROPHILS ABSOLUTE: NORMAL /ΜL
NEUTROPHILS RELATIVE PERCENT: NORMAL %
PDW BLD-RTO: NORMAL %
PLATELET # BLD: NORMAL K/ΜL
PMV BLD AUTO: NORMAL FL
POTASSIUM SERPL-SCNC: NORMAL MMOL/L
RBC # BLD: NORMAL 10^6/ΜL
SODIUM BLD-SCNC: NORMAL MMOL/L
TOTAL PROTEIN: NORMAL
TRIGL SERPL-MCNC: 131 MG/DL
TSH SERPL DL<=0.05 MIU/L-ACNC: 4.7 UIU/ML
VLDLC SERPL CALC-MCNC: NORMAL MG/DL
WBC # BLD: NORMAL 10^3/ML

## 2019-03-25 ENCOUNTER — OFFICE VISIT (OUTPATIENT)
Dept: FAMILY MEDICINE CLINIC | Age: 79
End: 2019-03-25
Payer: MEDICARE

## 2019-03-25 ENCOUNTER — HOSPITAL ENCOUNTER (OUTPATIENT)
Dept: GENERAL RADIOLOGY | Age: 79
Discharge: HOME | End: 2019-03-27
Payer: MEDICARE

## 2019-03-25 ENCOUNTER — HOSPITAL ENCOUNTER (OUTPATIENT)
Age: 79
Discharge: HOME | End: 2019-03-27
Payer: MEDICARE

## 2019-03-25 VITALS
SYSTOLIC BLOOD PRESSURE: 130 MMHG | OXYGEN SATURATION: 97 % | HEIGHT: 64 IN | HEART RATE: 98 BPM | DIASTOLIC BLOOD PRESSURE: 70 MMHG | BODY MASS INDEX: 29.74 KG/M2 | TEMPERATURE: 97.6 F | RESPIRATION RATE: 16 BRPM | WEIGHT: 174.2 LBS

## 2019-03-25 DIAGNOSIS — C34.92 ADENOCARCINOMA, LUNG, LEFT (HCC): ICD-10-CM

## 2019-03-25 DIAGNOSIS — E78.2 MIXED HYPERLIPIDEMIA: ICD-10-CM

## 2019-03-25 DIAGNOSIS — R05.9 COUGH: ICD-10-CM

## 2019-03-25 DIAGNOSIS — Z86.010 HISTORY OF COLON POLYPS: ICD-10-CM

## 2019-03-25 DIAGNOSIS — E03.9 ACQUIRED HYPOTHYROIDISM: Primary | ICD-10-CM

## 2019-03-25 PROCEDURE — 99214 OFFICE O/P EST MOD 30 MIN: CPT | Performed by: FAMILY MEDICINE

## 2019-03-25 PROCEDURE — 71046 X-RAY EXAM CHEST 2 VIEWS: CPT

## 2019-03-25 RX ORDER — DOXYCYCLINE HYCLATE 100 MG/1
100 CAPSULE ORAL 2 TIMES DAILY
Qty: 20 CAPSULE | Refills: 0 | Status: SHIPPED | OUTPATIENT
Start: 2019-03-25 | End: 2019-04-04

## 2019-03-25 RX ORDER — LEVOTHYROXINE SODIUM 137 UG/1
137 TABLET ORAL DAILY
Qty: 90 TABLET | Refills: 1 | Status: SHIPPED | OUTPATIENT
Start: 2019-03-25 | End: 2019-09-09 | Stop reason: SDUPTHER

## 2019-03-25 RX ORDER — SIMVASTATIN 40 MG
40 TABLET ORAL NIGHTLY
Qty: 90 TABLET | Refills: 1 | Status: SHIPPED | OUTPATIENT
Start: 2019-03-25 | End: 2019-09-27 | Stop reason: SDUPTHER

## 2019-03-25 ASSESSMENT — PATIENT HEALTH QUESTIONNAIRE - PHQ9
SUM OF ALL RESPONSES TO PHQ QUESTIONS 1-9: 0
SUM OF ALL RESPONSES TO PHQ9 QUESTIONS 1 & 2: 0
1. LITTLE INTEREST OR PLEASURE IN DOING THINGS: 0
SUM OF ALL RESPONSES TO PHQ QUESTIONS 1-9: 0
2. FEELING DOWN, DEPRESSED OR HOPELESS: 0

## 2019-03-26 DIAGNOSIS — E78.2 MIXED HYPERLIPIDEMIA: ICD-10-CM

## 2019-03-26 DIAGNOSIS — E03.9 ACQUIRED HYPOTHYROIDISM: ICD-10-CM

## 2019-05-20 ENCOUNTER — HOSPITAL ENCOUNTER (OUTPATIENT)
Dept: GENERAL RADIOLOGY | Age: 79
Discharge: HOME | End: 2019-05-22
Payer: MEDICARE

## 2019-05-20 ENCOUNTER — OFFICE VISIT (OUTPATIENT)
Dept: FAMILY MEDICINE CLINIC | Age: 79
End: 2019-05-20
Payer: MEDICARE

## 2019-05-20 ENCOUNTER — HOSPITAL ENCOUNTER (OUTPATIENT)
Age: 79
Discharge: HOME | End: 2019-05-22
Payer: MEDICARE

## 2019-05-20 VITALS
HEART RATE: 101 BPM | SYSTOLIC BLOOD PRESSURE: 120 MMHG | BODY MASS INDEX: 30.55 KG/M2 | WEIGHT: 175.2 LBS | OXYGEN SATURATION: 97 % | RESPIRATION RATE: 16 BRPM | TEMPERATURE: 98.2 F | DIASTOLIC BLOOD PRESSURE: 70 MMHG

## 2019-05-20 DIAGNOSIS — C34.92 ADENOCARCINOMA, LUNG, LEFT (HCC): ICD-10-CM

## 2019-05-20 DIAGNOSIS — J20.9 ACUTE BRONCHITIS, UNSPECIFIED ORGANISM: ICD-10-CM

## 2019-05-20 DIAGNOSIS — Z90.2 HISTORY OF LOBECTOMY OF LUNG: ICD-10-CM

## 2019-05-20 DIAGNOSIS — J20.9 ACUTE BRONCHITIS, UNSPECIFIED ORGANISM: Primary | ICD-10-CM

## 2019-05-20 PROCEDURE — 99213 OFFICE O/P EST LOW 20 MIN: CPT | Performed by: PHYSICIAN ASSISTANT

## 2019-05-20 PROCEDURE — 71046 X-RAY EXAM CHEST 2 VIEWS: CPT

## 2019-05-20 RX ORDER — PREDNISONE 20 MG/1
60 TABLET ORAL DAILY
Qty: 15 TABLET | Refills: 0 | Status: SHIPPED | OUTPATIENT
Start: 2019-05-20 | End: 2019-05-25

## 2019-05-20 RX ORDER — ALBUTEROL SULFATE 2.5 MG/3ML
2.5 SOLUTION RESPIRATORY (INHALATION) EVERY 6 HOURS PRN
Qty: 120 EACH | Refills: 3 | Status: SHIPPED
Start: 2019-05-20 | End: 2020-09-29 | Stop reason: SDUPTHER

## 2019-05-20 RX ORDER — ALBUTEROL SULFATE 90 UG/1
2 AEROSOL, METERED RESPIRATORY (INHALATION) EVERY 6 HOURS PRN
Qty: 1 INHALER | Refills: 5 | Status: SHIPPED
Start: 2019-05-20 | End: 2021-03-31 | Stop reason: SDUPTHER

## 2019-05-20 RX ORDER — DOXYCYCLINE HYCLATE 100 MG
100 TABLET ORAL 2 TIMES DAILY
Qty: 20 TABLET | Refills: 0 | Status: SHIPPED | OUTPATIENT
Start: 2019-05-20 | End: 2019-05-30

## 2019-05-20 NOTE — PROGRESS NOTES
Chief Complaint:   Shortness of Breath (Getting worse since 1 week); Wheezing; and Cough    History of Present Illness   Source of history provided by:  patient. William Thrasher is a [de-identified] y.o. old female with a past medical history of:   Past Medical History:   Diagnosis Date    Anesthesia     wakes up very slowly and has lasting drowsiness    Breast cancer ([facility]     breast    COPD with exacerbation ([facility] 11/14/2015    Glaucoma     History of colon polyps     Hyperlipidemia     Hypothyroidism     Osteoarthritis     Pneumonia    presents with a cough for the past week. States the cough is productive. She reports wheezing and SOB with exertion. She has a history of COPD and lung cancer with lobectomy. She takes Dulera and Proair. She was using the Proair 4 times per day recently. She started using a nebulizer yesterday which has provided a little more relief. Denies any fever, chills, ear pain, sore throat, headache, N/V/D, abdominal pain, CP, dizziness, or lethargy. ROS    Unless otherwise stated in this report or unable to obtain because of the patient's clinical or mental status as evidenced by the medical record, this patients's positive and negative responses for Review of Systems, constitutional, psych, eyes, ENT, cardiovascular, respiratory, gastrointestinal, neurological, genitourinary, musculoskeletal, integument systems and systems related to the presenting problem are either stated in the preceding or were not pertinent or were negative for the symptoms and/or complaints related to the medical problem. Past Surgical History:  has a past surgical history that includes Hysterectomy; Breast surgery; Breast lumpectomy (9/25/2012); other surgical history (11/1/12); Appendectomy; Colonoscopy (8/21/2013); Colonoscopy ([id number]); and eye surgery. Social History:  reports that she quit smoking about 14 years ago. Her smoking use included cigarettes. She has a 22.50 pack-year smoking history.

## 2019-05-21 ENCOUNTER — TELEPHONE (OUTPATIENT)
Dept: FAMILY MEDICINE CLINIC | Age: 79
End: 2019-05-21

## 2019-05-21 NOTE — TELEPHONE ENCOUNTER
----- Message from Jo Heller PA-C sent at 5/20/2019  4:21 PM EDT -----  Please let patient know that CXR did not show pneumonia. Keep appt for Friday.

## 2019-05-24 ENCOUNTER — OFFICE VISIT (OUTPATIENT)
Dept: FAMILY MEDICINE CLINIC | Age: 79
End: 2019-05-24
Payer: MEDICARE

## 2019-05-24 VITALS
OXYGEN SATURATION: 96 % | DIASTOLIC BLOOD PRESSURE: 76 MMHG | SYSTOLIC BLOOD PRESSURE: 130 MMHG | BODY MASS INDEX: 29.84 KG/M2 | WEIGHT: 174.8 LBS | HEIGHT: 64 IN | HEART RATE: 82 BPM | TEMPERATURE: 97.9 F | RESPIRATION RATE: 16 BRPM

## 2019-05-24 DIAGNOSIS — R06.02 SOB (SHORTNESS OF BREATH): ICD-10-CM

## 2019-05-24 DIAGNOSIS — C34.92 ADENOCARCINOMA, LUNG, LEFT (HCC): ICD-10-CM

## 2019-05-24 DIAGNOSIS — R91.1 NODULE OF LEFT LUNG: Primary | Chronic | ICD-10-CM

## 2019-05-24 PROCEDURE — 99213 OFFICE O/P EST LOW 20 MIN: CPT | Performed by: FAMILY MEDICINE

## 2019-05-24 NOTE — PROGRESS NOTES
6/9/2019    Chief Complaint   Patient presents with    Cough     Pt here for re-check for coughing -- feeling much better               Patient Active Problem List    Diagnosis Date Noted    Venous insufficiency of both lower extremities 09/17/2018    Raynaud's phenomenon without gangrene 08/10/2018    Adenocarcinoma, lung, left ([address] 03/09/2018    Nodule of left lung 10/20/2015    Disease of lung 04/06/2015     Overview Note:     Overview:   needs repeat CT scan 6/07      Hypothyroidism 04/03/2015     Overview Note:     Overview:   Hypothyroidism      Glaucoma 04/03/2015    Hyperlipidemia 04/03/2015     Overview Note:     Overview:   Hyperlipidemia      Personal history of breast cancer 02/03/2014    History of colon polyps 07/16/2013    Family history of colon cancer 07/16/2013     Subjective      1. Nodule of left lung      2. Adenocarcinoma, lung, left ([address]      3.  SOB (shortness of breath)            Goals      Keep scheduled appointments         Goals      Review of Systems     Review Of Systems    General:  No weight change no malaise no fatigue no change in appetite no sleep disturbance nofever/chills no night sweats  Skin:                 no abnormal pigmentation, no rash, no scaling,noitching,no Masses,no  hair or nail changes  Eyes:               no blurring, no diplopia, no  eye pain noglaucoma no cataracts  ENT:                 no hearing loss,no  Tinnitus,no  Vertigo,no osebleed, no nasalcongestion, no rhinorrhea,  no sore throat no jaw pain no hoarseness no bleedingNeck:    no node tenderness , not rigid, no masses   Respiratory:            no cough, no sputum,No coughing blood, no pleuritic , no chest pain, no dyspnea,  no wheezing  Cardiovascular:         no angina,No chest pain  No syncope, no pedal edema , no orthopnea, no PND, no palpitations, no claudication  Gastrointestinal  no nausea, no vomiting, no heartburn, no diarrhea, no constipation, no bloating,  no abdominal pain, no Tobacco Use    Smoking status: Former Smoker     Packs/day: 0.50     Years: 45.00     Pack years: 22.50     Types: Cigarettes     Last attempt to quit: 2005     Years since quittin.4    Smokeless tobacco: Never Used   Substance and Sexual Activity    Alcohol use: No     Alcohol/week: 0.0 oz     Comment: socially    Drug use: No    Sexual activity: Not Currently   Lifestyle    Physical activity:     Days per week: Not on file     Minutes per session: Not on file    Stress: Not on file   Relationships    Social connections:     Talks on phone: Not on file     Gets together: Not on file     Attends [employer] service: Not on file     Active member of club or organization: Not on file     Attends meetings of clubs or organizations: Not on file     Relationship status: Not on file    Intimate partner violence:     Fear of current or ex partner: Not on file     Emotionally abused: Not on file     Physically abused: Not on file     Forced sexual activity: Not on file   Other Topics Concern    Not on file   Social History Narrative    Not on file           Scheduled Meds:     Current Outpatient Medications   Medication Sig Dispense Refill    albuterol (PROVENTIL) (2.5 MG/3ML) 0.083% nebulizer solution Take 3 mLs by nebulization every 6 hours as needed for Wheezing 120 each 3    simvastatin (ZOCOR) 40 MG tablet Take 1 tablet by mouth nightly 90 tablet 1    levothyroxine (SYNTHROID) 137 MCG tablet Take 1 tablet by mouth daily 90 tablet 1    omeprazole (PRILOSEC) 20 MG delayed release capsule Take 20 mg by mouth daily      Elastic Bandages & Supports (JOBST KNEE HIGH COMPRESSION SM) MISC Compression stockings 20-30 mm hg pantyhose high bilaterally  Dx :  Venous Insufficiency, Swelling 2 each 2    Handicap Placard MISC by Does not apply route Patient cannot walk 200 ft without stopping Duration: 5 years 1 each 0    latanoprost (XALATAN) 0.005 % ophthalmic solution Place 1 drop into the right eye left lung      2. Adenocarcinoma, lung, left ([address]      3. SOB (shortness of breath)                        4. Reviewed labs    5. Return in about 2 weeks (around 6/7/2019).            Des Sue M.D.    6/9/2019

## 2019-05-28 LAB — TSH SERPL DL<=0.05 MIU/L-ACNC: 0.16 UIU/ML

## 2019-06-07 ENCOUNTER — OFFICE VISIT (OUTPATIENT)
Dept: FAMILY MEDICINE CLINIC | Age: 79
End: 2019-06-07
Payer: MEDICARE

## 2019-06-07 VITALS
DIASTOLIC BLOOD PRESSURE: 70 MMHG | SYSTOLIC BLOOD PRESSURE: 134 MMHG | BODY MASS INDEX: 31.18 KG/M2 | OXYGEN SATURATION: 95 % | WEIGHT: 176 LBS | RESPIRATION RATE: 18 BRPM | HEIGHT: 63 IN | TEMPERATURE: 97.3 F | HEART RATE: 112 BPM

## 2019-06-07 DIAGNOSIS — Z90.2 HISTORY OF LOBECTOMY OF LUNG: ICD-10-CM

## 2019-06-07 DIAGNOSIS — J44.1 CHRONIC OBSTRUCTIVE PULMONARY DISEASE WITH ACUTE EXACERBATION (HCC): Primary | ICD-10-CM

## 2019-06-07 PROCEDURE — 99213 OFFICE O/P EST LOW 20 MIN: CPT | Performed by: FAMILY MEDICINE

## 2019-06-07 NOTE — PROGRESS NOTES
6/24/2019    Chief Complaint   Patient presents with    Cough     2 week follow up for breathing problem               Patient Active Problem List    Diagnosis Date Noted    Venous insufficiency of both lower extremities 09/17/2018    Raynaud's phenomenon without gangrene 08/10/2018    Adenocarcinoma, lung, left ([address] 03/09/2018    Nodule of left lung 10/20/2015    Disease of lung 04/06/2015     Overview Note:     Overview:   needs repeat CT scan 6/07      Hypothyroidism 04/03/2015     Overview Note:     Overview:   Hypothyroidism      Glaucoma 04/03/2015    Hyperlipidemia 04/03/2015     Overview Note:     Overview:   Hyperlipidemia      Personal history of breast cancer 02/03/2014    History of colon polyps 07/16/2013    Family history of colon cancer 07/16/2013     Subjective    Has been on dulera for 2 weeks. Feels like she is going backwards again. Worse on exertion and with eating    1. Chronic obstructive pulmonary disease with acute exacerbation (HCC)  Not feeling a whole lot better will addanticholinergic to see if this helps and she will call in  - External Referral To Pulmonology    2.  History of lobectomy of lung    - External Referral To Pulmonology        Goals      Keep scheduled appointments         Goals      Review of Systems     Review Of Systems    General:  No weight change no malaise no fatigue no change in appetite no sleep disturbance nofever/chills no night sweats  Skin:                 no abnormal pigmentation, no rash, no scaling,noitching,no Masses,no  hair or nail changes  Eyes:               no blurring, no diplopia, no  eye pain noglaucoma no cataracts  ENT:                 no hearing loss,no  Tinnitus,no  Vertigo,no osebleed, no nasalcongestion, no rhinorrhea,  no sore throat no jaw pain no hoarseness no bleeding  Gums   ___ dentures  Neck:    no node tenderness , not rigid, no masses   Respiratory:            no cough, no sputum,No coughing blood, no pleuritic , no chest education level: Not on file   Occupational History    Occupation: homemaker   Social Needs    Financial resource strain: Not on file    Food insecurity:     Worry: Not on file     Inability: Not on file    Transportation needs:     Medical: Not on file     Non-medical: Not on file   Tobacco Use    Smoking status: Former Smoker     Packs/day: 0.50     Years: 45.00     Pack years: 22.50     Types: Cigarettes     Last attempt to quit: 2005     Years since quittin.4    Smokeless tobacco: Never Used   Substance and Sexual Activity    Alcohol use: No     Alcohol/week: 0.0 oz     Comment: socially    Drug use: No    Sexual activity: Not Currently   Lifestyle    Physical activity:     Days per week: Not on file     Minutes per session: Not on file    Stress: Not on file   Relationships    Social connections:     Talks on phone: Not on file     Gets together: Not on file     Attends [employer] service: Not on file     Active member of club or organization: Not on file     Attends meetings of clubs or organizations: Not on file     Relationship status: Not on file    Intimate partner violence:     Fear of current or ex partner: Not on file     Emotionally abused: Not on file     Physically abused: Not on file     Forced sexual activity: Not on file   Other Topics Concern    Not on file   Social History Narrative    Not on file           Scheduled Meds:     Current Outpatient Medications   Medication Sig Dispense Refill    umeclidinium-vilanterol (ANORO ELLIPTA) 62.5-25 MCG/INH AEPB inhaler Inhale 1 puff into the lungs daily 1 puff 0    beclomethasone (QVAR) 80 MCG/ACT inhaler Inhale 1 puff into the lungs 2 times daily 1 Inhaler 3    albuterol sulfate HFA (PROAIR HFA) 108 (90 Base) MCG/ACT inhaler Inhale 2 puffs into the lungs every 6 hours as needed for Wheezing 1 Inhaler 5    albuterol (PROVENTIL) (2.5 MG/3ML) 0.083% nebulizer solution Take 3 mLs by nebulization every 6 hours as needed for Wheezing 120 each 3    simvastatin (ZOCOR) 40 MG tablet Take 1 tablet by mouth nightly 90 tablet 1    levothyroxine (SYNTHROID) 137 MCG tablet Take 1 tablet by mouth daily 90 tablet 1    omeprazole (PRILOSEC) 20 MG delayed release capsule Take 20 mg by mouth daily      Elastic Bandages & Supports (JOBST KNEE HIGH COMPRESSION ) MISC Compression stockings 20-30 mm hg pantyhose high bilaterally  Dx : Venous Insufficiency, Swelling 2 each 2    Handicap Placard MISC by Does not apply route Patient cannot walk 200 ft without stopping Duration: 5 years 1 each 0    latanoprost (XALATAN) 0.005 % ophthalmic solution Place 1 drop into the right eye nightly        No current facility-administered medications for this visit. Wt Readings from Last 3 Encounters:   06/07/19 176 lb (79.8 kg)   05/24/19 174 lb 12.8 oz (79.3 kg)   05/20/19 175 lb 3.2 oz (79.5 kg)         Vitals:    06/07/19 1419   BP: 134/70   Pulse: 112   Resp: 18   Temp: 97.3 °F (36.3 °C)   SpO2: 95%         Physical Exam    Physical Exam:    General: Noobesity, no cachexia  Skin:    Warm and dry. Not pale, not flushed , no lesions, No rash , no bruises  HEENT:   PERRLA, EOMI. Conjunctiva clear, no  Drainage, no jaundice, ext canals normal ,no cerumenimpaction,TM's normal ,not injected ,no fluid . Normal nasal mucosa not boggy , not inflamed . Septum normal, turbinates not swollen, oral mucosa moist , lips normal, teeth normal, tongue normal, salivary glands normal, nosinus tenderness, tmj normal, throat no injected, no cobblestoning, no drainage , tonsils normal no exudates  Neck:    Supple,No JVD, No thyromegaly, No carotid bruit, no adenopathy  Cardiac:   PMI Normal,RRR, No gallop , no  murmur.  No S3, no S4, no abd aortic bruit, normal pulses, , no pedal edema  Lungs:   CTA, symmetrical,  No wheezes no rales, Normal percussion, no use of accessory muscles,   Abdomen:  Normal bowel sounds, abd soft, non-tender, no rebound no guarding,no hepatomegaly, no splenomegaly, no hernia, no ascites, normal palpation  Extremities:   No clubbing, no edema no cyanosis. Pedal pulses ok  M/S:  Back normal,no kyphosis, no cva tenderness,  Head and neck normal rom, shoulders normal strength , , hips normal , gait normal , no cane or walker , no motor deficits, no clubbing, normal nails  Neurological: A & O x 3, Moves all extremities,  No gross neuro deficits ,normal DTR, normal memory, normal judgement/insight, normal affect           Assessment:    1. Chronic obstructive pulmonary disease with acute exacerbation ([facility]    - External Referral To Pulmonology    anoro plus breo    2. History of lobectomy of lung    - External Referral To Pulmonology                      4. Reviewed labs    5. Return in about 3 months (around 9/7/2019).            Connie Hoskins M.D.    6/24/2019

## 2019-06-11 DIAGNOSIS — E03.9 ACQUIRED HYPOTHYROIDISM: ICD-10-CM

## 2019-06-12 ENCOUNTER — TELEPHONE (OUTPATIENT)
Dept: FAMILY MEDICINE CLINIC | Age: 79
End: 2019-06-12

## 2019-06-12 NOTE — TELEPHONE ENCOUNTER
Pt was calling in with an update. Pt stated she is feeling a little bit better. DAY[facility] Miranda's ph# [phone].

## 2019-09-09 RX ORDER — LEVOTHYROXINE SODIUM 137 UG/1
137 TABLET ORAL DAILY
Qty: 90 TABLET | Refills: 0 | Status: SHIPPED | OUTPATIENT
Start: 2019-09-09 | End: 2019-12-10 | Stop reason: SDUPTHER

## 2019-09-10 LAB
ALBUMIN SERPL-MCNC: NORMAL G/DL
ALP BLD-CCNC: NORMAL U/L
ALT SERPL-CCNC: NORMAL U/L
ANION GAP SERPL CALCULATED.3IONS-SCNC: NORMAL MMOL/L
AST SERPL-CCNC: NORMAL U/L
BASOPHILS ABSOLUTE: NORMAL /ΜL
BASOPHILS RELATIVE PERCENT: NORMAL %
BILIRUB SERPL-MCNC: NORMAL MG/DL (ref 0.1–1.4)
BUN BLDV-MCNC: NORMAL MG/DL
CALCIUM SERPL-MCNC: NORMAL MG/DL
CHLORIDE BLD-SCNC: NORMAL MMOL/L
CHOLESTEROL, TOTAL: 176 MG/DL
CHOLESTEROL/HDL RATIO: 2.8
CO2: NORMAL MMOL/L
CREAT SERPL-MCNC: NORMAL MG/DL
EOSINOPHILS ABSOLUTE: NORMAL /ΜL
EOSINOPHILS RELATIVE PERCENT: NORMAL %
GFR CALCULATED: NORMAL
GLUCOSE BLD-MCNC: 100 MG/DL
HCT VFR BLD CALC: 43.2 % (ref 36–46)
HDLC SERPL-MCNC: 62 MG/DL (ref 35–70)
HEMOGLOBIN: 14.4 G/DL (ref 12–16)
LDL CHOLESTEROL CALCULATED: 90 MG/DL (ref 0–160)
LYMPHOCYTES ABSOLUTE: NORMAL /ΜL
LYMPHOCYTES RELATIVE PERCENT: NORMAL %
MCH RBC QN AUTO: NORMAL PG
MCHC RBC AUTO-ENTMCNC: NORMAL G/DL
MCV RBC AUTO: NORMAL FL
MONOCYTES ABSOLUTE: NORMAL /ΜL
MONOCYTES RELATIVE PERCENT: NORMAL %
NEUTROPHILS ABSOLUTE: NORMAL /ΜL
NEUTROPHILS RELATIVE PERCENT: NORMAL %
PDW BLD-RTO: NORMAL %
PLATELET # BLD: NORMAL K/ΜL
PMV BLD AUTO: NORMAL FL
POTASSIUM SERPL-SCNC: NORMAL MMOL/L
RBC # BLD: NORMAL 10^6/ΜL
SODIUM BLD-SCNC: NORMAL MMOL/L
TOTAL PROTEIN: NORMAL
TRIGL SERPL-MCNC: 122 MG/DL
TSH SERPL DL<=0.05 MIU/L-ACNC: 0.13 UIU/ML
VLDLC SERPL CALC-MCNC: NORMAL MG/DL
WBC # BLD: NORMAL 10^3/ML

## 2019-09-27 ENCOUNTER — OFFICE VISIT (OUTPATIENT)
Dept: FAMILY MEDICINE CLINIC | Age: 79
End: 2019-09-27
Payer: MEDICARE

## 2019-09-27 VITALS
SYSTOLIC BLOOD PRESSURE: 118 MMHG | OXYGEN SATURATION: 100 % | DIASTOLIC BLOOD PRESSURE: 60 MMHG | TEMPERATURE: 96.6 F | HEART RATE: 97 BPM | BODY MASS INDEX: 30.48 KG/M2 | HEIGHT: 63 IN | WEIGHT: 172 LBS | RESPIRATION RATE: 14 BRPM

## 2019-09-27 DIAGNOSIS — Z00.00 ROUTINE GENERAL MEDICAL EXAMINATION AT A HEALTH CARE FACILITY: ICD-10-CM

## 2019-09-27 DIAGNOSIS — Z12.39 BREAST SCREENING, UNSPECIFIED: ICD-10-CM

## 2019-09-27 DIAGNOSIS — Z23 NEED FOR PROPHYLACTIC VACCINATION AND INOCULATION AGAINST CHOLERA ALONE: Primary | ICD-10-CM

## 2019-09-27 DIAGNOSIS — C34.92 ADENOCARCINOMA, LUNG, LEFT (HCC): ICD-10-CM

## 2019-09-27 DIAGNOSIS — E78.2 MIXED HYPERLIPIDEMIA: ICD-10-CM

## 2019-09-27 DIAGNOSIS — E03.9 ACQUIRED HYPOTHYROIDISM: ICD-10-CM

## 2019-09-27 PROCEDURE — G0439 PPPS, SUBSEQ VISIT: HCPCS | Performed by: FAMILY MEDICINE

## 2019-09-27 PROCEDURE — 90653 IIV ADJUVANT VACCINE IM: CPT | Performed by: FAMILY MEDICINE

## 2019-09-27 PROCEDURE — G0008 ADMIN INFLUENZA VIRUS VAC: HCPCS | Performed by: FAMILY MEDICINE

## 2019-09-27 RX ORDER — SIMVASTATIN 40 MG
40 TABLET ORAL NIGHTLY
Qty: 90 TABLET | Refills: 1 | Status: SHIPPED
Start: 2019-09-27 | End: 2020-03-16 | Stop reason: SDUPTHER

## 2019-09-27 ASSESSMENT — PATIENT HEALTH QUESTIONNAIRE - PHQ9
SUM OF ALL RESPONSES TO PHQ QUESTIONS 1-9: 0
SUM OF ALL RESPONSES TO PHQ QUESTIONS 1-9: 0

## 2019-09-27 ASSESSMENT — LIFESTYLE VARIABLES: HOW OFTEN DO YOU HAVE A DRINK CONTAINING ALCOHOL: 0

## 2019-09-27 NOTE — PROGRESS NOTES
speech normal    Patient's complete Health Risk Assessment and screening values have been reviewed and are found in Flowsheets. The following problems were reviewed today and where indicated follow up appointments were made and/or referrals ordered. Positive Risk Factor Screenings with Interventions:     Health Habits/Nutrition:  Health Habits/Nutrition  Do you exercise for at least 20 minutes 2-3 times per week?: Yes  Have you lost any weight without trying in the past 3 months?: No  Do you eat fewer than 2 meals per day?: No  Have you seen a dentist within the past year?: Yes  Body mass index is 30.47 kg/m². Health Habits/Nutrition Interventions:  · . Hearing/Vision:  No exam data present  Hearing/Vision  Do you or your family notice any trouble with your hearing?: (!) Yes  Do you have difficulty driving, watching TV, or doing any of your daily activities because of your eyesight?: (!) Yes  Have you had an eye exam within the past year?: Yes  Hearing/Vision Interventions:  · .     Personalized Preventive Plan   Current Health Maintenance Status  Immunization History   Administered Date(s) Administered    Influenza, High Dose (Fluzone 65 yrs and older) 10/03/2014, 10/07/2015, 10/04/2016, 09/11/2017, 09/24/2018    Influenza, Triv, inactivated, subunit, adjuvanted, IM (Fluad 65 yrs and older) 09/27/2019    Pneumococcal Conjugate 13-valent ([id number]) 10/07/2015    Pneumococcal Polysaccharide ([id number]) 10/08/2007    Zoster Recombinant (Shingrix) 02/19/2019, 05/13/2019        Health Maintenance   Topic Date Due    DTaP/Tdap/Td vaccine (1 - Tdap) 06/19/1959    Annual Wellness Visit (AWV)  05/29/2019    Lipid screen  03/14/2020    TSH testing  05/28/2020    DEXA (modify frequency per FRAX score)  Completed    Flu vaccine  Completed    Shingles Vaccine  Completed    Pneumococcal 65+ years Vaccine  Completed     Recommendations for Preventive Services Due: see orders and patient instructions/AVS.  . Recommended screening schedule for the next 5-10 years is provided to the patient in written form: see Patient Jewel Cantrell was seen today for medicare awv. Diagnoses and all orders for this visit:    Need for prophylactic vaccination and inoculation against cholera alone  -     INFLUENZA, TRIV, INACTIVATED, SUBUNIT, ADJUVANTED, 65 YRS AND OLDER, IM, PREFILL SYR, 0.5ML (FLUAD TRIV)    Mixed hyperlipidemia  -     simvastatin (ZOCOR) 40 MG tablet; Take 1 tablet by mouth nightly  -     CBC Auto Differential; Future  -     Comprehensive Metabolic Panel; Future  -     Lipid Panel; Future    Routine general medical examination at a [facility] facility    Adenocarcinoma, lung, left ([address]  Follows [place] with ct scan    Acquired hypothyroidism    Lab Results   Component Value Date    TSH 0.156 05/28/2019       -     TSH without Reflex; Future    Breast screening, unspecified  -     AGNES DIGITAL SCREEN W OR WO CAD BILATERAL;  Future

## 2019-10-03 DIAGNOSIS — E03.9 ACQUIRED HYPOTHYROIDISM: ICD-10-CM

## 2019-10-03 DIAGNOSIS — E78.2 MIXED HYPERLIPIDEMIA: ICD-10-CM

## 2019-12-10 RX ORDER — LEVOTHYROXINE SODIUM 137 UG/1
137 TABLET ORAL DAILY
Qty: 90 TABLET | Refills: 0 | Status: SHIPPED
Start: 2019-12-10 | End: 2020-02-26 | Stop reason: SDUPTHER

## 2020-01-09 ENCOUNTER — HOSPITAL ENCOUNTER (OUTPATIENT)
Dept: GENERAL RADIOLOGY | Age: 80
Discharge: HOME | End: 2020-01-11
Payer: MEDICARE

## 2020-01-09 PROCEDURE — 77063 BREAST TOMOSYNTHESIS BI: CPT

## 2020-01-09 PROCEDURE — G0279 TOMOSYNTHESIS, MAMMO: HCPCS

## 2020-02-26 RX ORDER — LEVOTHYROXINE SODIUM 137 UG/1
137 TABLET ORAL DAILY
Qty: 30 TABLET | Refills: 1 | Status: SHIPPED
Start: 2020-02-26 | End: 2020-03-16 | Stop reason: SDUPTHER

## 2020-03-09 LAB
ALBUMIN SERPL-MCNC: NORMAL G/DL
ALP BLD-CCNC: NORMAL U/L
ALT SERPL-CCNC: NORMAL U/L
ANION GAP SERPL CALCULATED.3IONS-SCNC: NORMAL MMOL/L
AST SERPL-CCNC: NORMAL U/L
BASOPHILS ABSOLUTE: NORMAL
BASOPHILS RELATIVE PERCENT: NORMAL
BILIRUB SERPL-MCNC: NORMAL MG/DL
BUN BLDV-MCNC: NORMAL MG/DL
CALCIUM SERPL-MCNC: NORMAL MG/DL
CHLORIDE BLD-SCNC: NORMAL MMOL/L
CHOLESTEROL, TOTAL: 175 MG/DL
CHOLESTEROL/HDL RATIO: 3
CO2: NORMAL
CREAT SERPL-MCNC: NORMAL MG/DL
EOSINOPHILS ABSOLUTE: NORMAL
EOSINOPHILS RELATIVE PERCENT: NORMAL
GFR CALCULATED: NORMAL
GLUCOSE BLD-MCNC: 119 MG/DL
HCT VFR BLD CALC: 42.2 % (ref 36–46)
HDLC SERPL-MCNC: 58 MG/DL (ref 35–70)
HEMOGLOBIN: 13.9 G/DL (ref 12–16)
LDL CHOLESTEROL CALCULATED: 94 MG/DL (ref 0–160)
LYMPHOCYTES ABSOLUTE: NORMAL
LYMPHOCYTES RELATIVE PERCENT: NORMAL
MCH RBC QN AUTO: NORMAL PG
MCHC RBC AUTO-ENTMCNC: NORMAL G/DL
MCV RBC AUTO: NORMAL FL
MONOCYTES ABSOLUTE: NORMAL
MONOCYTES RELATIVE PERCENT: NORMAL
NEUTROPHILS ABSOLUTE: NORMAL
NEUTROPHILS RELATIVE PERCENT: NORMAL
PLATELET # BLD: NORMAL 10*3/UL
PMV BLD AUTO: NORMAL FL
POTASSIUM SERPL-SCNC: NORMAL MMOL/L
RBC # BLD: NORMAL 10*6/UL
SODIUM BLD-SCNC: NORMAL MMOL/L
TOTAL PROTEIN: NORMAL
TRIGL SERPL-MCNC: 115 MG/DL
VLDLC SERPL CALC-MCNC: NORMAL MG/DL
WBC # BLD: NORMAL 10*3/UL

## 2020-03-16 RX ORDER — SIMVASTATIN 40 MG
40 TABLET ORAL NIGHTLY
Qty: 90 TABLET | Refills: 1 | Status: SHIPPED
Start: 2020-03-16 | End: 2020-09-23 | Stop reason: SDUPTHER

## 2020-03-16 RX ORDER — LEVOTHYROXINE SODIUM 137 UG/1
137 TABLET ORAL DAILY
Qty: 90 TABLET | Refills: 1 | Status: SHIPPED
Start: 2020-03-16 | End: 2020-09-23 | Stop reason: SDUPTHER

## 2020-03-16 NOTE — TELEPHONE ENCOUNTER
Pt last seen 9/27/19 - Cancelled upcoming appt due to corona virus concerns. Pt take omeprazole OTC 40 mg per day she is wondering if that can be sent as an Rx she is trying to avoid going in the store.

## 2020-08-14 ENCOUNTER — TELEPHONE (OUTPATIENT)
Dept: FAMILY MEDICINE CLINIC | Age: 80
End: 2020-08-14

## 2020-08-14 NOTE — TELEPHONE ENCOUNTER
Would like BW orders before Apt on 10/2/2020-Can she get her flu shot on 10/02/20 Visit or should she get it earlier?

## 2020-08-18 NOTE — TELEPHONE ENCOUNTER
Left message notifying patient and instructing them to call the office with any questions or concerns.   BW orders mailed to patient

## 2020-09-17 LAB
ALBUMIN SERPL-MCNC: NORMAL G/DL
ALP BLD-CCNC: NORMAL U/L
ALT SERPL-CCNC: NORMAL U/L
ANION GAP SERPL CALCULATED.3IONS-SCNC: NORMAL MMOL/L
AST SERPL-CCNC: NORMAL U/L
BASOPHILS ABSOLUTE: NORMAL
BASOPHILS RELATIVE PERCENT: NORMAL
BILIRUB SERPL-MCNC: NORMAL MG/DL
BUN BLDV-MCNC: NORMAL MG/DL
CALCIUM SERPL-MCNC: NORMAL MG/DL
CHLORIDE BLD-SCNC: NORMAL MMOL/L
CHOLESTEROL, TOTAL: 179 MG/DL
CHOLESTEROL/HDL RATIO: 3
CO2: NORMAL
CREAT SERPL-MCNC: NORMAL MG/DL
EOSINOPHILS ABSOLUTE: NORMAL
EOSINOPHILS RELATIVE PERCENT: NORMAL
GFR CALCULATED: NORMAL
GLUCOSE BLD-MCNC: NORMAL MG/DL
HCT VFR BLD CALC: NORMAL %
HDLC SERPL-MCNC: 60 MG/DL (ref 35–70)
HEMOGLOBIN: NORMAL
LDL CHOLESTEROL CALCULATED: 90 MG/DL (ref 0–160)
LYMPHOCYTES ABSOLUTE: NORMAL
LYMPHOCYTES RELATIVE PERCENT: NORMAL
MCH RBC QN AUTO: NORMAL PG
MCHC RBC AUTO-ENTMCNC: NORMAL G/DL
MCV RBC AUTO: NORMAL FL
MONOCYTES ABSOLUTE: NORMAL
MONOCYTES RELATIVE PERCENT: NORMAL
NEUTROPHILS ABSOLUTE: NORMAL
NEUTROPHILS RELATIVE PERCENT: NORMAL
NONHDLC SERPL-MCNC: NORMAL MG/DL
PDW BLD-RTO: NORMAL %
PLATELET # BLD: NORMAL 10*3/UL
PMV BLD AUTO: NORMAL FL
POTASSIUM SERPL-SCNC: NORMAL MMOL/L
RBC # BLD: NORMAL 10*6/UL
SODIUM BLD-SCNC: NORMAL MMOL/L
TOTAL PROTEIN: NORMAL
TRIGL SERPL-MCNC: 146 MG/DL
TSH SERPL DL<=0.05 MIU/L-ACNC: NORMAL M[IU]/L
VLDLC SERPL CALC-MCNC: NORMAL MG/DL
WBC # BLD: NORMAL 10*3/UL

## 2020-09-21 ENCOUNTER — TELEPHONE (OUTPATIENT)
Dept: FAMILY MEDICINE CLINIC | Age: 80
End: 2020-09-21

## 2020-09-21 NOTE — TELEPHONE ENCOUNTER
----- Message from Renee Butterfield MD sent at 9/21/2020  8:25 AM EDT -----  Labs are good. No medication changes.

## 2020-09-23 ENCOUNTER — OFFICE VISIT (OUTPATIENT)
Dept: FAMILY MEDICINE CLINIC | Age: 80
End: 2020-09-23
Payer: MEDICARE

## 2020-09-23 VITALS
TEMPERATURE: 97.8 F | HEART RATE: 88 BPM | WEIGHT: 178.8 LBS | DIASTOLIC BLOOD PRESSURE: 78 MMHG | HEIGHT: 63 IN | RESPIRATION RATE: 16 BRPM | OXYGEN SATURATION: 98 % | SYSTOLIC BLOOD PRESSURE: 120 MMHG | BODY MASS INDEX: 31.68 KG/M2

## 2020-09-23 PROCEDURE — 99213 OFFICE O/P EST LOW 20 MIN: CPT | Performed by: FAMILY MEDICINE

## 2020-09-23 RX ORDER — LEVOTHYROXINE SODIUM 137 UG/1
137 TABLET ORAL DAILY
Qty: 90 TABLET | Refills: 1 | Status: SHIPPED
Start: 2020-09-23 | End: 2021-03-31 | Stop reason: SDUPTHER

## 2020-09-23 RX ORDER — DORZOLAMIDE HCL 20 MG/ML
SOLUTION/ DROPS OPHTHALMIC
COMMUNITY
Start: 2020-08-16 | End: 2020-09-29

## 2020-09-23 RX ORDER — TRIAMCINOLONE ACETONIDE 55 UG/1
2 SPRAY, METERED NASAL DAILY
COMMUNITY

## 2020-09-23 RX ORDER — PREDNISONE 20 MG/1
TABLET ORAL
Qty: 17 TABLET | Refills: 0 | Status: SHIPPED
Start: 2020-09-23 | End: 2021-08-20 | Stop reason: ALTCHOICE

## 2020-09-23 RX ORDER — AZITHROMYCIN 250 MG/1
250 TABLET, FILM COATED ORAL SEE ADMIN INSTRUCTIONS
Qty: 6 TABLET | Refills: 0 | Status: SHIPPED | OUTPATIENT
Start: 2020-09-23 | End: 2020-09-28

## 2020-09-23 RX ORDER — SIMVASTATIN 40 MG
40 TABLET ORAL NIGHTLY
Qty: 90 TABLET | Refills: 1 | Status: SHIPPED
Start: 2020-09-23 | End: 2021-03-31 | Stop reason: SDUPTHER

## 2020-09-23 ASSESSMENT — PATIENT HEALTH QUESTIONNAIRE - PHQ9
2. FEELING DOWN, DEPRESSED OR HOPELESS: 0
SUM OF ALL RESPONSES TO PHQ9 QUESTIONS 1 & 2: 0
SUM OF ALL RESPONSES TO PHQ QUESTIONS 1-9: 0
SUM OF ALL RESPONSES TO PHQ QUESTIONS 1-9: 0
1. LITTLE INTEREST OR PLEASURE IN DOING THINGS: 0

## 2020-09-29 ENCOUNTER — OFFICE VISIT (OUTPATIENT)
Dept: FAMILY MEDICINE CLINIC | Age: 80
End: 2020-09-29
Payer: MEDICARE

## 2020-09-29 VITALS
HEART RATE: 65 BPM | HEIGHT: 61 IN | DIASTOLIC BLOOD PRESSURE: 74 MMHG | TEMPERATURE: 97.2 F | SYSTOLIC BLOOD PRESSURE: 134 MMHG | RESPIRATION RATE: 16 BRPM | OXYGEN SATURATION: 97 % | BODY MASS INDEX: 33.79 KG/M2 | WEIGHT: 179 LBS

## 2020-09-29 PROCEDURE — G0008 ADMIN INFLUENZA VIRUS VAC: HCPCS | Performed by: FAMILY MEDICINE

## 2020-09-29 PROCEDURE — 90694 VACC AIIV4 NO PRSRV 0.5ML IM: CPT | Performed by: FAMILY MEDICINE

## 2020-09-29 PROCEDURE — G0439 PPPS, SUBSEQ VISIT: HCPCS | Performed by: FAMILY MEDICINE

## 2020-09-29 RX ORDER — ALBUTEROL SULFATE 2.5 MG/3ML
2.5 SOLUTION RESPIRATORY (INHALATION) EVERY 6 HOURS PRN
Qty: 120 EACH | Refills: 3 | Status: SHIPPED | OUTPATIENT
Start: 2020-09-29

## 2020-09-29 ASSESSMENT — PATIENT HEALTH QUESTIONNAIRE - PHQ9
SUM OF ALL RESPONSES TO PHQ QUESTIONS 1-9: 0
SUM OF ALL RESPONSES TO PHQ QUESTIONS 1-9: 0
2. FEELING DOWN, DEPRESSED OR HOPELESS: 0
1. LITTLE INTEREST OR PLEASURE IN DOING THINGS: 0
SUM OF ALL RESPONSES TO PHQ9 QUESTIONS 1 & 2: 0

## 2020-09-29 ASSESSMENT — LIFESTYLE VARIABLES: HOW OFTEN DO YOU HAVE A DRINK CONTAINING ALCOHOL: 0

## 2020-09-29 NOTE — PROGRESS NOTES
Medicare Annual Wellness Visit  Name: Loras Eisenmenger Date: 10/12/2020   MRN: [id number] Sex: Female   Age: 78 y.o. Ethnicity: Non-/Non    : 1940 Race: Suzette Philip is here for Medicare AWV and Shortness of Breath (1 week recheck )    Screenings for behavioral, psychosocial and functional/safety risks, and cognitive dysfunction are all negative except as indicated below. These results, as well as other patient data from the [address] form, are documented in Flowsheets linked to this Encounter. Allergies   Allergen Reactions    Beta Adrenergic Blockers Shortness Of Breath     Severe wheezing with timolol    Timolol Maleate Shortness Of Breath    Aspirin      bruising    Pcn [Penicillins] Hives         Prior to Visit Medications    Medication Sig Taking? Authorizing Provider   albuterol (PROVENTIL) (2.5 MG/3ML) 0.083% nebulizer solution Take 3 mLs by nebulization every 6 hours as needed for Wheezing Yes Constantino Ly MD   Handicap Placard MISC by Does not apply route 1 year Yes Constantino Ly MD   triamcinolone (NASACORT ALLERGY 24HR) 55 MCG/ACT nasal inhaler 2 sprays by Each Nostril route daily Yes Historical Provider, MD   predniSONE (DELTASONE) 20 MG tablet 2 tabs daily x 5 days, 1 tab daily x 7 days Yes Constantino Ly MD   simvastatin (ZOCOR) 40 MG tablet Take 1 tablet by mouth nightly Yes Constantino Ly MD   levothyroxine (SYNTHROID) 137 MCG tablet Take 1 tablet by mouth daily Yes Constantino Ly MD   mometasone-formoterol (DULERA) 200-5 MCG/ACT inhaler Inhale 2 puffs into the lungs every 12 hours Rinse mouth with water after use.  Yes Constantino Ly MD   albuterol sulfate HFA (PROAIR HFA) 108 (90 Base) MCG/ACT inhaler Inhale 2 puffs into the lungs every 6 hours as needed for Wheezing Yes Caterina George PA-C   omeprazole (PRILOSEC) 20 MG delayed release capsule Take 20 mg by mouth daily Yes Historical Provider, MD   latanoprost [place]) Height: 5' 1\" (1.549 m)     Body mass index is 33.82 kg/m². Based upon direct observation of the patient, evaluation of cognition reveals recent and remote memory intact. General Appearance: alert and oriented to person, place and time, well developed and well- nourished, in no acute distress  Skin: warm and dry, no rash or erythema  Head: normocephalic and atraumatic  Eyes: pupils equal, round, and reactive to light, extraocular eye movements intact, conjunctivae normal  ENT: tympanic membrane, external ear and ear canal normal bilaterally, nose without deformity, nasal mucosa and turbinates normal without polyps  Neck: supple and non-tender without mass, no thyromegaly or thyroid nodules, no cervical lymphadenopathy  Pulmonary/Chest: clear to auscultation bilaterally- no wheezes, rales or rhonchi, normal air movement, no respiratory distress  Cardiovascular: normal rate, regular rhythm, normal S1 and S2, no murmurs, rubs, clicks, or gallops, distal pulses intact, no carotid bruits  Abdomen: soft, non-tender, non-distended, normal bowel sounds, no masses or organomegaly  Extremities: no cyanosis, clubbing or edema  Musculoskeletal: normal range of motion, no joint swelling, deformity or tenderness  Neurologic: reflexes normal and symmetric, no cranial nerve deficit, gait, coordination and speech normal    Patient's complete Health Risk Assessment and screening values have been reviewed and are found in Flowsheets. The following problems were reviewed today and where indicated follow up appointments were made and/or referrals ordered.     Positive Risk Factor Screenings with Interventions:     Health Habits/Nutrition:  Health Habits/Nutrition  Do you exercise for at least 20 minutes 2-3 times per week?: Yes  Have you lost any weight without trying in the past 3 months?: No  Do you eat fewer than 2 meals per day?: No  Have you seen a dentist within the past year?: Yes  Body mass index: 33.82  Health Hepatitis B vaccine  Aged Out    Hib vaccine  Aged Out    Meningococcal (ACWY) vaccine  Aged Out     Recommendations for [employer] Due: see orders and patient instructions/AVS.  . Recommended screening schedule for the next 5-10 years is provided to the patient in written form: see Patient Bud Johnson was seen today for medicare awv and shortness of breath. Diagnoses and all orders for this visit:      Much improved from last week after inhalers and abx and steroids    Need for influenza vaccination  -     INFLUENZA, QUADV, ADJUVANTED, 72 YRS =, IM, PF, PREFILL SYR, 0.5ML (FLUAD)    Adenocarcinoma, lung, left (HCC)  -     albuterol (PROVENTIL) (2.5 MG/3ML) 0.083% nebulizer solution; Take 3 mLs by nebulization every 6 hours as needed for Wheezing  -     Handicap Placard MISC; by Does not apply route 1 year    History of lobectomy of lung  -     albuterol (PROVENTIL) (2.5 MG/3ML) 0.083% nebulizer solution; Take 3 mLs by nebulization every 6 hours as needed for Wheezing  -     Handicap Placard MISC; by Does not apply route 1 year    Acute bronchitis, unspecified organism  -     albuterol (PROVENTIL) (2.5 MG/3ML) 0.083% nebulizer solution; Take 3 mLs by nebulization every 6 hours as needed for Wheezing  -     Handicap Placard MISC; by Does not apply route 1 year    Routine general medical examination at a health care facility    Other screening mammogram  -     AGNES DIGITAL SCREEN BILATERAL PER PROTOCOL;  Future

## 2020-09-29 NOTE — PROGRESS NOTES
Vaccine Information Sheet, \"Influenza - Inactivated\"  given to Jeimy Reilly, or parent/legal guardian of  Jeimy Reilly and verbalized understanding. Patient responses:    Have you ever had a reaction to a flu vaccine? No  Do you have any current illness? No  Have you ever had Guillian Uledi Syndrome? No  Do you have a serious allergy to any of the follow: Neomycin, Polymyxin, Thimerosal, eggs or egg products? No    Flu vaccine given per order. Please see immunization tab. Risks and benefits explained. Current VIS given.

## 2021-01-04 ENCOUNTER — VIRTUAL VISIT (OUTPATIENT)
Dept: FAMILY MEDICINE CLINIC | Age: 81
End: 2021-01-04
Payer: MEDICARE

## 2021-01-04 DIAGNOSIS — J44.1 ACUTE EXACERBATION OF CHRONIC OBSTRUCTIVE PULMONARY DISEASE (COPD) (HCC): Primary | ICD-10-CM

## 2021-01-04 PROCEDURE — 99213 OFFICE O/P EST LOW 20 MIN: CPT | Performed by: FAMILY MEDICINE

## 2021-01-04 RX ORDER — PREDNISONE 20 MG/1
20 TABLET ORAL 3 TIMES DAILY
Qty: 30 TABLET | Refills: 0 | Status: SHIPPED | OUTPATIENT
Start: 2021-01-04 | End: 2021-01-14

## 2021-01-04 ASSESSMENT — PATIENT HEALTH QUESTIONNAIRE - PHQ9
1. LITTLE INTEREST OR PLEASURE IN DOING THINGS: 0
2. FEELING DOWN, DEPRESSED OR HOPELESS: 0
SUM OF ALL RESPONSES TO PHQ QUESTIONS 1-9: 0

## 2021-01-08 ENCOUNTER — VIRTUAL VISIT (OUTPATIENT)
Dept: FAMILY MEDICINE CLINIC | Age: 81
End: 2021-01-08
Payer: MEDICARE

## 2021-01-08 DIAGNOSIS — J44.1 CHRONIC OBSTRUCTIVE PULMONARY DISEASE WITH ACUTE EXACERBATION (HCC): Primary | ICD-10-CM

## 2021-01-08 DIAGNOSIS — E78.2 MIXED HYPERLIPIDEMIA: ICD-10-CM

## 2021-01-08 DIAGNOSIS — E03.9 ACQUIRED HYPOTHYROIDISM: ICD-10-CM

## 2021-01-08 PROCEDURE — 99213 OFFICE O/P EST LOW 20 MIN: CPT | Performed by: FAMILY MEDICINE

## 2021-01-08 RX ORDER — DORZOLAMIDE HCL 20 MG/ML
SOLUTION/ DROPS OPHTHALMIC
COMMUNITY
Start: 2020-11-20

## 2021-01-24 NOTE — PROGRESS NOTES
TELEHEALTH VIDEO VISIT    HPI:    Hema Li (:  1940) has requested an audio/video evaluation for the following concern(s):    Chief Complaint   Patient presents with    COPD     flare up x2 dys       Was having some sob and dyspnea      Review of Systems      Prior to Visit Medications    Medication Sig Taking? Authorizing Provider   albuterol (PROVENTIL) (2.5 MG/3ML) 0.083% nebulizer solution Take 3 mLs by nebulization every 6 hours as needed for Wheezing Yes MD Maynor RochaC by Does not apply route 1 year Yes Shanique Gloria MD   triamcinolone (NASACORT ALLERGY 24HR) 55 MCG/ACT nasal inhaler 2 sprays by Each Nostril route daily Yes Historical Provider, MD   predniSONE (DELTASONE) 20 MG tablet 2 tabs daily x 5 days, 1 tab daily x 7 days  Patient not taking: Reported on 2021 Yes Shanique Gloria MD   simvastatin (ZOCOR) 40 MG tablet Take 1 tablet by mouth nightly Yes Shanique Gloria MD   levothyroxine (SYNTHROID) 137 MCG tablet Take 1 tablet by mouth daily Yes Shanique Gloria MD   mometasone-formoterol (DULERA) 200-5 MCG/ACT inhaler Inhale 2 puffs into the lungs every 12 hours Rinse mouth with water after use. Yes Shanique Gloria MD   albuterol sulfate HFA (PROAIR HFA) 108 (90 Base) MCG/ACT inhaler Inhale 2 puffs into the lungs every 6 hours as needed for Wheezing Yes Caterina George PA-C   omeprazole (PRILOSEC) 20 MG delayed release capsule Take 20 mg by mouth daily Yes Historical Provider, MD   Elastic Bandages & Supports (JOBST KNEE HIGH COMPRESSION SM) MISC Compression stockings 20-30 mm hg pantyhose high bilaterally  Dx :  Venous Insufficiency, Swelling Yes MD Maynor Thapa by Does not apply route Patient cannot walk 200 ft without stopping Duration: 5 years Yes Shanique Gloria MD   latanoprost (XALATAN) 0.005 % ophthalmic solution Place 1 drop into the right eye nightly  Yes Historical Provider, MD   dorzolamide (TRUSOPT) 2 % ophthalmic solution INSTILL 1 DROP INTO EACH EYE 3 TIMES A DAY  Historical Provider, MD       Social History     Tobacco Use    Smoking status: Former Smoker     Packs/day: 0.50     Years: 45.00     Pack years: 22.50     Types: Cigarettes     Quit date: 2005     Years since quittin.0    Smokeless tobacco: Never Used   Substance Use Topics    Alcohol use: No     Alcohol/week: 0.0 standard drinks     Comment: socially    Drug use: No        Allergies   Allergen Reactions    Beta Adrenergic Blockers Shortness Of Breath     Severe wheezing with timolol    Timolol Maleate Shortness Of Breath    Aspirin      bruising    Pcn [Penicillins] Hives       PHYSICAL EXAMINATION:  [ INSTRUCTIONS:  \"[x]\" Indicates a positive item  \"[]\" Indicates a negative item  -- DELETE ALL ITEMS NOT EXAMINED]  Vital Signs: (As obtained by patient/caregiver or practitioner observation)    Blood pressure-  Heart rate-    Respiratory rate-    Temperature-  Pulse oximetry-     Constitutional: [x] Appears well-developed and well-nourished [] No apparent distress      [] Abnormal-   Mental status  [x] Alert and awake  [x] Oriented to person/place/time [x]Able to follow commands      Eyes:  EOM    []  Normal  [] Abnormal-  Sclera  []  Normal  [] Abnormal -         Discharge []  None visible  [] Abnormal -    HENT:   [] Normocephalic, atraumatic.   [] Abnormal   [] Mouth/Throat: Mucous membranes are moist.     External Ears [] Normal  [] Abnormal-     Neck: [] No visualized mass     Pulmonary/Chest: [x] Respiratory effort normal.  [] No visualized signs of difficulty breathing or respiratory distress        [] Abnormal-      Musculoskeletal:   [] Normal gait with no signs of ataxia         [] Normal range of motion of neck        [] Abnormal-       Neurological:        [] No Facial Asymmetry (Cranial nerve 7 motor function) (limited exam to video visit)          [] No gaze palsy        [] Abnormal-         Skin:        [] No significant exanthematous lesions or discoloration noted on facial skin         [] Abnormal-            Psychiatric:       [x] Normal Affect [] No Hallucinations        [] Abnormal-     Other pertinent observable physical exam findings-     ASSESSMENT/PLAN:  1. Acute exacerbation of chronic obstructive pulmonary disease (COPD) (HCC)    - predniSONE (DELTASONE) 20 MG tablet; Take 1 tablet by mouth 3 times daily for 10 days  Dispense: 30 tablet; Refill: 0        Return in about 4 days (around 1/8/2021). TeleMedicine video visit    This visit was performed as a virtual video visit using a synchronous, two-way, audio-video telehealth technology platform. Patient identification was verified at the start of the visit, including the patient's telephone number and physical location. I discussed with the patient the nature of our telehealth visits, that:     1. Due to the nature of an audio- video modality, the only components of a physical exam that could be done are the elements supported by direct observation. 2. I would evaluate the patient and recommend diagnostics and treatments based on my assessment. 3. If it was felt that the patient should be evaluated in clinic or an emergency room setting, then they would be directed there. 4. Our sessions are not being recorded and that personal health information is protected. 5. Our team would provide follow up care in person if/when the patient needs it. Patient does agree to proceed with telemedicine consultation. Patient's location: home address in [facility] [facility]  Physician  location other address in [facility] other people involved in call no        Time spent: Greater than Not billed by time    This visit was completed virtually using [email or web address]    --Destini Reyes MD on 1/23/2021 at 7:43 PM    An electronic signature was used to authenticate this note.

## 2021-01-30 NOTE — PROGRESS NOTES
TELEHEALTH VIDEO VISIT    HPI:    Akash Aviles (:  1940) has requested an audio/video evaluation for the following concern(s):    Chief Complaint   Patient presents with    COPD     f/u steriod treatment, doing much better. Pt much improved with oral steroids  Less wheeiong and sob  No fever or chills    Review of Systems      Prior to Visit Medications    Medication Sig Taking? Authorizing Provider   dorzolamide (TRUSOPT) 2 % ophthalmic solution INSTILL 1 DROP INTO EACH EYE 3 TIMES A DAY Yes Historical Provider, MD   albuterol (PROVENTIL) (2.5 MG/3ML) 0.083% nebulizer solution Take 3 mLs by nebulization every 6 hours as needed for Wheezing Yes Conley Phoenix, MD   simvastatin (ZOCOR) 40 MG tablet Take 1 tablet by mouth nightly Yes Conley Phoenix, MD   levothyroxine (SYNTHROID) 137 MCG tablet Take 1 tablet by mouth daily Yes Conley Phoenix, MD   mometasone-formoterol [facility]) 200-5 MCG/ACT inhaler Inhale 2 puffs into the lungs every 12 hours Rinse mouth with water after use. Yes Conley Phoenix, MD   albuterol sulfate HFA (PROAIR HFA) 108 (90 Base) MCG/ACT inhaler Inhale 2 puffs into the lungs every 6 hours as needed for Wheezing Yes Caterina George PA-C   omeprazole (PRILOSEC) 20 MG delayed release capsule Take 20 mg by mouth daily Yes Historical Provider, MD   latanoprost (XALATAN) 0.005 % ophthalmic solution Place 1 drop into the right eye nightly  Yes Historical Provider, MD   Handicap Placard MISC by Does not apply route 1 year  Conley Phoenix, MD   triamcinolone (NASACORT ALLERGY 24HR) 55 MCG/ACT nasal inhaler 2 sprays by Each Nostril route daily  Historical Provider, MD   predniSONE (DELTASONE) 20 MG tablet 2 tabs daily x 5 days, 1 tab daily x 7 days  Patient not taking: Reported on 2021  Conley Phoenix, MD   Elastic Bandages & Supports (JOBST KNEE HIGH COMPRESSION SM) MISC Compression stockings 20-30 mm hg pantyhose high bilaterally  Dx :  Venous Insufficiency, Swelling MD Maynor Virk Placard MISC by Does not apply route Patient cannot walk 200 ft without stopping Duration: 5 years  Dejuan Davis MD       Social History     Tobacco Use    Smoking status: Former Smoker     Packs/day: 0.50     Years: 45.00     Pack years: 22.50     Types: Cigarettes     Quit date: 2005     Years since quittin.0    Smokeless tobacco: Never Used   Substance Use Topics    Alcohol use: No     Alcohol/week: 0.0 standard drinks     Comment: socially    Drug use: No        Allergies   Allergen Reactions    Beta Adrenergic Blockers Shortness Of Breath     Severe wheezing with timolol    Timolol Maleate Shortness Of Breath    Aspirin      bruising    Pcn [Penicillins] Hives       PHYSICAL EXAMINATION:  [ INSTRUCTIONS:  \"[x]\" Indicates a positive item  \"[]\" Indicates a negative item  -- DELETE ALL ITEMS NOT EXAMINED]  Vital Signs: (As obtained by patient/caregiver or practitioner observation)    Blood pressure-  Heart rate-    Respiratory rate-    Temperature-  Pulse oximetry-     Constitutional: [x] Appears well-developed and well-nourished [x] No apparent distress      [] Abnormal-   Mental status  [x] Alert and awake  [x] Oriented to person/place/time [x]Able to follow commands      Eyes:  EOM    []  Normal  [] Abnormal-  Sclera  []  Normal  [] Abnormal -         Discharge []  None visible  [] Abnormal -    HENT:   [] Normocephalic, atraumatic.   [] Abnormal   [] Mouth/Throat: Mucous membranes are moist.     External Ears [] Normal  [] Abnormal-     Neck: [] No visualized mass     Pulmonary/Chest: [x] Respiratory effort normal.  [x] No visualized signs of difficulty breathing or respiratory distress        [] Abnormal-      Musculoskeletal:   [] Normal gait with no signs of ataxia         [] Normal range of motion of neck        [] Abnormal-       Neurological:        [] No Facial Asymmetry (Cranial nerve 7 motor function) (limited exam to video visit)          [] No gaze palsy        [] Abnormal-         Skin:        [] No significant exanthematous lesions or discoloration noted on facial skin         [] Abnormal-            Psychiatric:       [x] Normal Affect [] No Hallucinations        [] Abnormal-     Other pertinent observable physical exam findings-     ASSESSMENT/PLAN:  1. Mixed hyperlipidemia   cholesterol is good  Is on zocor and no side effects  - TSH without Reflex; Future    2. Acquired hypothyroidism  Lab Results   Component Value Date    TSH 0.128 09/10/2019       - CBC Auto Differential; Future  - Comprehensive Metabolic Panel; Future  - Lipid Panel; Future    3. Chronic obstructive pulmonary disease with acute exacerbation (HCC)  Doing well on steroids        No follow-ups on file. TeleMedicine video visit    This visit was performed as a virtual video visit using a synchronous, two-way, audio-video telehealth technology platform. Patient identification was verified at the start of the visit, including the patient's telephone number and physical location. I discussed with the patient the nature of our telehealth visits, that:     1. Due to the nature of an audio- video modality, the only components of a physical exam that could be done are the elements supported by direct observation. 2. I would evaluate the patient and recommend diagnostics and treatments based on my assessment. 3. If it was felt that the patient should be evaluated in clinic or an emergency room setting, then they would be directed there. 4. Our sessions are not being recorded and that personal health information is protected. 5. Our team would provide follow up care in person if/when the patient needs it. Patient does agree to proceed with telemedicine consultation.     Patient's location: home address in [facility]  Physician  location other address in [facility] other people involved in call no        Time spent: Greater than Not billed by time    This visit was completed virtually using [email or web address]    --Poly Meredith MD on 1/30/2021 at 1:52 PM    An electronic signature was used to authenticate this note.

## 2021-02-24 ENCOUNTER — HOSPITAL ENCOUNTER (OUTPATIENT)
Dept: GENERAL RADIOLOGY | Age: 81
Discharge: HOME | End: 2021-02-26
Payer: MEDICARE

## 2021-02-24 DIAGNOSIS — Z12.31 OTHER SCREENING MAMMOGRAM: ICD-10-CM

## 2021-02-24 PROCEDURE — 77067 SCR MAMMO BI INCL CAD: CPT

## 2021-03-15 DIAGNOSIS — E78.2 MIXED HYPERLIPIDEMIA: ICD-10-CM

## 2021-03-15 DIAGNOSIS — E03.9 ACQUIRED HYPOTHYROIDISM: ICD-10-CM

## 2021-03-15 LAB
ALBUMIN SERPL-MCNC: 4 G/DL (ref 3.5–5.2)
ALP BLD-CCNC: 86 U/L (ref 35–104)
ALT SERPL-CCNC: 52 U/L (ref 0–32)
ANION GAP SERPL CALCULATED.3IONS-SCNC: 15 MMOL/L (ref 7–16)
AST SERPL-CCNC: 30 U/L (ref 0–31)
BASOPHILS ABSOLUTE: 0.09 E9/L (ref 0–0.2)
BASOPHILS RELATIVE PERCENT: 1.2 % (ref 0–2)
BILIRUB SERPL-MCNC: 0.4 MG/DL (ref 0–1.2)
BUN BLDV-MCNC: 13 MG/DL (ref 8–23)
CALCIUM SERPL-MCNC: 10.3 MG/DL (ref 8.6–10.2)
CHLORIDE BLD-SCNC: 104 MMOL/L (ref 98–107)
CHOLESTEROL, TOTAL: 166 MG/DL (ref 0–199)
CO2: 24 MMOL/L (ref 22–29)
CREAT SERPL-MCNC: 0.7 MG/DL (ref 0.5–1)
EOSINOPHILS ABSOLUTE: 0.17 E9/L (ref 0.05–0.5)
EOSINOPHILS RELATIVE PERCENT: 2.3 % (ref 0–6)
GFR AFRICAN AMERICAN: >60
GFR NON-AFRICAN AMERICAN: >60 ML/MIN/1.73
GLUCOSE BLD-MCNC: 146 MG/DL (ref 74–99)
HCT VFR BLD CALC: 46.1 % (ref 34–48)
HDLC SERPL-MCNC: 56 MG/DL
HEMOGLOBIN: 14.7 G/DL (ref 11.5–15.5)
IMMATURE GRANULOCYTES #: 0.05 E9/L
IMMATURE GRANULOCYTES %: 0.7 % (ref 0–5)
LDL CHOLESTEROL CALCULATED: 84 MG/DL (ref 0–99)
LYMPHOCYTES ABSOLUTE: 1.78 E9/L (ref 1.5–4)
LYMPHOCYTES RELATIVE PERCENT: 24.4 % (ref 20–42)
MCH RBC QN AUTO: 30.8 PG (ref 26–35)
MCHC RBC AUTO-ENTMCNC: 31.9 % (ref 32–34.5)
MCV RBC AUTO: 96.4 FL (ref 80–99.9)
MONOCYTES ABSOLUTE: 0.64 E9/L (ref 0.1–0.95)
MONOCYTES RELATIVE PERCENT: 8.8 % (ref 2–12)
NEUTROPHILS ABSOLUTE: 4.56 E9/L (ref 1.8–7.3)
NEUTROPHILS RELATIVE PERCENT: 62.6 % (ref 43–80)
PDW BLD-RTO: 13.2 FL (ref 11.5–15)
PLATELET # BLD: 238 E9/L (ref 130–450)
PMV BLD AUTO: 12.2 FL (ref 7–12)
POTASSIUM SERPL-SCNC: 5 MMOL/L (ref 3.5–5)
RBC # BLD: 4.78 E12/L (ref 3.5–5.5)
SODIUM BLD-SCNC: 143 MMOL/L (ref 132–146)
TOTAL PROTEIN: 6.9 G/DL (ref 6.4–8.3)
TRIGL SERPL-MCNC: 132 MG/DL (ref 0–149)
TSH SERPL DL<=0.05 MIU/L-ACNC: 0.22 UIU/ML (ref 0.27–4.2)
VLDLC SERPL CALC-MCNC: 26 MG/DL
WBC # BLD: 7.3 E9/L (ref 4.5–11.5)

## 2021-03-31 ENCOUNTER — OFFICE VISIT (OUTPATIENT)
Dept: FAMILY MEDICINE CLINIC | Age: 81
End: 2021-03-31
Payer: MEDICARE

## 2021-03-31 VITALS
OXYGEN SATURATION: 97 % | SYSTOLIC BLOOD PRESSURE: 110 MMHG | WEIGHT: 180 LBS | DIASTOLIC BLOOD PRESSURE: 68 MMHG | RESPIRATION RATE: 16 BRPM | BODY MASS INDEX: 31.89 KG/M2 | HEART RATE: 98 BPM | HEIGHT: 63 IN | TEMPERATURE: 97.2 F

## 2021-03-31 DIAGNOSIS — Z90.2 HISTORY OF LOBECTOMY OF LUNG: ICD-10-CM

## 2021-03-31 DIAGNOSIS — R06.02 SOB (SHORTNESS OF BREATH): ICD-10-CM

## 2021-03-31 DIAGNOSIS — H40.9 GLAUCOMA OF BOTH EYES, UNSPECIFIED GLAUCOMA TYPE: ICD-10-CM

## 2021-03-31 DIAGNOSIS — E03.9 ACQUIRED HYPOTHYROIDISM: ICD-10-CM

## 2021-03-31 DIAGNOSIS — E78.2 MIXED HYPERLIPIDEMIA: ICD-10-CM

## 2021-03-31 DIAGNOSIS — C34.92 ADENOCARCINOMA, LUNG, LEFT (HCC): ICD-10-CM

## 2021-03-31 DIAGNOSIS — J20.9 ACUTE BRONCHITIS, UNSPECIFIED ORGANISM: Primary | ICD-10-CM

## 2021-03-31 DIAGNOSIS — Z85.3 PERSONAL HISTORY OF BREAST CANCER: ICD-10-CM

## 2021-03-31 PROCEDURE — 99214 OFFICE O/P EST MOD 30 MIN: CPT | Performed by: FAMILY MEDICINE

## 2021-03-31 RX ORDER — LEVOTHYROXINE SODIUM 137 UG/1
137 TABLET ORAL DAILY
Qty: 90 TABLET | Refills: 1 | Status: SHIPPED
Start: 2021-03-31 | End: 2021-09-29 | Stop reason: DRUGHIGH

## 2021-03-31 RX ORDER — ALBUTEROL SULFATE 90 UG/1
2 AEROSOL, METERED RESPIRATORY (INHALATION) EVERY 6 HOURS PRN
Qty: 1 INHALER | Refills: 6 | Status: SHIPPED
Start: 2021-03-31 | End: 2022-10-06 | Stop reason: SDUPTHER

## 2021-03-31 RX ORDER — SIMVASTATIN 40 MG
40 TABLET ORAL NIGHTLY
Qty: 90 TABLET | Refills: 1 | Status: SHIPPED
Start: 2021-03-31 | End: 2021-10-12 | Stop reason: SDUPTHER

## 2021-03-31 SDOH — ECONOMIC STABILITY: INCOME INSECURITY: HOW HARD IS IT FOR YOU TO PAY FOR THE VERY BASICS LIKE FOOD, HOUSING, MEDICAL CARE, AND HEATING?: NOT HARD AT ALL

## 2021-03-31 SDOH — ECONOMIC STABILITY: FOOD INSECURITY: WITHIN THE PAST 12 MONTHS, THE FOOD YOU BOUGHT JUST DIDN'T LAST AND YOU DIDN'T HAVE MONEY TO GET MORE.: NEVER TRUE

## 2021-03-31 NOTE — PROGRESS NOTES
No flowsheet data found. Interpretation of LAY-7 score: 5-9 = mild anxiety, 10-14 = moderate anxiety, 15+ = severe anxiety. Recommend referral to behavioral health for scores 10 or greater. 2021        Bianca Mckeon (: 1940 IS A 78 y.o. female ,Established patient, here for eval of Thyroid Problem (6 month check-up) and Medication Refill          Current Outpatient Medications   Medication Sig Dispense Refill    simvastatin (ZOCOR) 40 MG tablet Take 1 tablet by mouth nightly 90 tablet 1    levothyroxine (SYNTHROID) 137 MCG tablet Take 1 tablet by mouth daily 90 tablet 1    albuterol sulfate HFA (PROAIR HFA) 108 (90 Base) MCG/ACT inhaler Inhale 2 puffs into the lungs every 6 hours as needed for Wheezing 1 Inhaler 6    fluticasone-umeclidin-vilant (TRELEGY ELLIPTA) 100-62.5-25 MCG/INH AEPB Inhale 1 puff into the lungs daily 1 each 1    dorzolamide (TRUSOPT) 2 % ophthalmic solution INSTILL 1 DROP INTO EACH EYE 3 TIMES A DAY      triamcinolone (NASACORT ALLERGY 24HR) 55 MCG/ACT nasal inhaler 2 sprays by Each Nostril route daily      mometasone-formoterol (DULERA) 200-5 MCG/ACT inhaler Inhale 2 puffs into the lungs every 12 hours Rinse mouth with water after use.  3 Inhaler 1    omeprazole (PRILOSEC) 20 MG delayed release capsule Take 40 mg by mouth daily       Handicap Placard MISC by Does not apply route Patient cannot walk 200 ft without stopping Duration: 5 years 1 each 0    latanoprost (XALATAN) 0.005 % ophthalmic solution Place 1 drop into the right eye nightly       albuterol (PROVENTIL) (2.5 MG/3ML) 0.083% nebulizer solution Take 3 mLs by nebulization every 6 hours as needed for Wheezing (Patient not taking: Reported on 3/31/2021) 120 each 3    Handicap Placard MISC by Does not apply route 1 year 1 each 0    predniSONE (DELTASONE) 20 MG tablet 2 tabs daily x 5 days, 1 tab daily x 7 days (Patient not taking: Reported on 2021) 17 tablet 0    Elastic Bandages & Supports (JOBST lungs daily  Dispense: 1 each; Refill: 1   mometasone-formoterol (DULERA) 200-5 MCG/ACT inhaler; Inhale 2 puffs into the lungs every 12 hours Rinse mouth with water after use. Dispense: 1 Inhaler; Refill: 6  Stable, cont meds recheck 6 mos    4. Adenocarcinoma, lung, left ([address]  *follows with pulm in [place] and is doing well  Had ESTEFANI resection  - albuterol sulfate HFA (PROAIR HFA) 108 (90 Base) MCG/ACT inhaler; Inhale 2 puffs into the lungs every 6 hours as needed for Wheezing  Dispense: 1 Inhaler; Refill: 6  Stable, cont meds recheck 6 mos        7. Glaucoma of both eyes, unspecified glaucoma type  tahes drops sees ophthalmology  Stable, cont meds recheck 6 mos      8. Personal history of breast cancer  Stable, cont meds recheck 6 mos              SUBJECTIVE    Review of Systems   Constitutional: Negative for activity change and appetite change. HENT: Negative. Eyes: Negative for pain and visual disturbance. Respiratory: Positive for shortness of breath. Negative for cough, choking, wheezing and stridor. Cardiovascular: Negative. Genitourinary: Positive for frequency. Musculoskeletal: Positive for arthralgias and myalgias. Neurological: Positive for dizziness, light-headedness and numbness. Psychiatric/Behavioral: Positive for dysphoric mood. OBJECTIVE    Wt Readings from Last 3 Encounters:   03/31/21 180 lb (81.6 kg)   09/29/20 179 lb (81.2 kg)   09/23/20 178 lb 12.8 oz (81.1 kg)       Vitals:    03/31/21 0831   BP: 110/68   Pulse: 98   Resp: 16   Temp: 97.2 °F (36.2 °C)   SpO2: 97%       Physical Exam        No follow-ups on file. An electronic signature was used to authenticate this note.     Andie Farias    4/11/2021

## 2021-04-11 ASSESSMENT — ENCOUNTER SYMPTOMS
SHORTNESS OF BREATH: 1
WHEEZING: 0
STRIDOR: 0
COUGH: 0
CHOKING: 0
EYE PAIN: 0

## 2021-05-14 DIAGNOSIS — R60.0 PEDAL EDEMA: ICD-10-CM

## 2021-05-25 DIAGNOSIS — R06.02 SOB (SHORTNESS OF BREATH): ICD-10-CM

## 2021-05-25 DIAGNOSIS — J20.9 ACUTE BRONCHITIS, UNSPECIFIED ORGANISM: ICD-10-CM

## 2021-08-17 ENCOUNTER — VIRTUAL VISIT (OUTPATIENT)
Dept: FAMILY MEDICINE CLINIC | Age: 81
End: 2021-08-17
Payer: MEDICARE

## 2021-08-17 DIAGNOSIS — Z90.2 HISTORY OF LOBECTOMY OF LUNG: ICD-10-CM

## 2021-08-17 DIAGNOSIS — J44.1 CHRONIC OBSTRUCTIVE PULMONARY DISEASE WITH ACUTE EXACERBATION (HCC): ICD-10-CM

## 2021-08-17 DIAGNOSIS — J20.9 ACUTE BRONCHITIS, UNSPECIFIED ORGANISM: Primary | ICD-10-CM

## 2021-08-17 PROCEDURE — 99213 OFFICE O/P EST LOW 20 MIN: CPT | Performed by: PHYSICIAN ASSISTANT

## 2021-08-17 RX ORDER — DOXYCYCLINE HYCLATE 100 MG
100 TABLET ORAL 2 TIMES DAILY WITH MEALS
Qty: 20 TABLET | Refills: 0 | Status: ON HOLD
Start: 2021-08-17 | End: 2021-08-25 | Stop reason: HOSPADM

## 2021-08-17 ASSESSMENT — ENCOUNTER SYMPTOMS
VOMITING: 0
CHEST TIGHTNESS: 1
SHORTNESS OF BREATH: 0
COUGH: 1
ABDOMINAL PAIN: 0
NAUSEA: 0
DIARRHEA: 0

## 2021-08-17 NOTE — PROGRESS NOTES
2021    TELEHEALTH EVALUATION -- Audio/Visual (During PYERK-05 public health emergency)    HPI:    Raegan Michael (:  1940) has requested an audio/video evaluation for the following concern(s):    Patient presents for cough and chest congestion  Felt okay yesterday but started coughing more today  reprots more SOB than usual but not too bad  Has been using her nebulizer   Currently in yellow zone on COPD paper that she has  Karlee fever, chills, CP, or loss of taste/smell  Reports history of COPD, lung cancer with lung resection  Does follow with pulmonology    Review of Systems   Constitutional: Negative for chills and fever. HENT: Negative for ear pain. Eyes: Negative for visual disturbance. Respiratory: Positive for cough and chest tightness. Negative for shortness of breath. Cardiovascular: Negative for chest pain and palpitations. Gastrointestinal: Negative for abdominal pain, diarrhea, nausea and vomiting. Genitourinary: Negative for dysuria and frequency. Skin: Negative for rash. Prior to Visit Medications    Medication Sig Taking?  Authorizing Provider   doxycycline hyclate (VIBRA-TABS) 100 MG tablet Take 1 tablet by mouth 2 times daily (with meals) for 10 days Yes Caterina George PA-C   fluticasone-umeclidin-vilant (TRELEGY ELLIPTA) 100-62.5-25 MCG/INH AEPB Inhale 1 puff into the lungs daily Yes Elías Srivastava MD   Handchuckyp Placard MISC by Does not apply route Patient cannot walk 200 ft without stopping Duration: 5 years Yes Elías Srivastava MD   simvastatin (ZOCOR) 40 MG tablet Take 1 tablet by mouth nightly Yes Elías Srivastava MD   levothyroxine (SYNTHROID) 137 MCG tablet Take 1 tablet by mouth daily Yes Elías Srivastava MD   albuterol sulfate HFA (PROAIR HFA) 108 (90 Base) MCG/ACT inhaler Inhale 2 puffs into the lungs every 6 hours as needed for Wheezing Yes Elías Srivastava MD   dorzolamide (TRUSOPT) 2 % ophthalmic solution INSTILL 1 DROP INTO EACH EYE 3 TIMES A DAY Yes Historical Provider, MD   albuterol (PROVENTIL) (2.5 MG/3ML) 0.083% nebulizer solution Take 3 mLs by nebulization every 6 hours as needed for Wheezing Yes Zhane Melgar MD   Handicap Placard MISC by Does not apply route 1 year Yes Zhane Melgar MD   triamcinolone (NASACORT ALLERGY 24HR) 55 MCG/ACT nasal inhaler 2 sprays by Each Nostril route daily Yes Historical Provider, MD   predniSONE (DELTASONE) 20 MG tablet 2 tabs daily x 5 days, 1 tab daily x 7 days Yes Zhane Melgar MD   mometasone-formoterol [facility]) 200-5 MCG/ACT inhaler Inhale 2 puffs into the lungs every 12 hours Rinse mouth with water after use. Yes Zhane Melgar MD   omeprazole (PRILOSEC) 20 MG delayed release capsule Take 40 mg by mouth daily  Yes Historical Provider, MD   Elastic Bandages & Supports (JOBST KNEE HIGH COMPRESSION SM) MISC Compression stockings 20-30 mm hg pantyhose high bilaterally  Dx :  Venous Insufficiency, Swelling Yes Sandra Andrade MD   latanoprost (XALATAN) 0.005 % ophthalmic solution Place 1 drop into the right eye nightly  Yes Historical Provider, MD       Social History     Tobacco Use    Smoking status: Former Smoker     Packs/day: 0.50     Years: 45.00     Pack years: 22.50     Types: Cigarettes     Quit date: 2005     Years since quittin.6    Smokeless tobacco: Never Used   Vaping Use    Vaping Use: Never used   Substance Use Topics    Alcohol use: No     Alcohol/week: 0.0 standard drinks     Comment: socially    Drug use: No      PHYSICAL EXAMINATION:  [ INSTRUCTIONS:  \"[x]\" Indicates a positive item  \"[]\" Indicates a negative item  -- DELETE ALL ITEMS NOT EXAMINED]  Constitutional: [x] Appears well-developed and well-nourished [x] No apparent distress      [] Abnormal-   Mental status  [x] Alert and awake  [x] Oriented to person/place/time [x]Able to follow commands      Eyes:  EOM    [x]  Normal  [] Abnormal-  Sclera  [x]  Normal  [] Abnormal -         Discharge [x]  None visible personal health information is protected. 5. Our team would provide follow up care in person if/when the patient needs it. Patient does agree to proceed with telemedicine consultation. Patient's location: home address in [place]      Provider location other address in [facility]     Other people involved in call: none    Time spent: Greater than 15    This visit was completed virtually using Doxy. me    --Juan Collins PA-C on 8/17/2021 at 4:37 PM    An electronic signature was used to authenticate this note. PACU- Home

## 2021-08-20 ENCOUNTER — HOSPITAL ENCOUNTER (INPATIENT)
Age: 81
LOS: 5 days | Discharge: NURSING, REHAB OR LONG-TERM CARE | DRG: 190 | End: 2021-08-25
Attending: EMERGENCY MEDICINE | Admitting: FAMILY MEDICINE
Payer: MEDICARE

## 2021-08-20 ENCOUNTER — APPOINTMENT (OUTPATIENT)
Dept: GENERAL RADIOLOGY | Age: 81
DRG: 190 | End: 2021-08-20
Payer: MEDICARE

## 2021-08-20 ENCOUNTER — APPOINTMENT (OUTPATIENT)
Dept: CT IMAGING | Age: 81
DRG: 190 | End: 2021-08-20
Payer: MEDICARE

## 2021-08-20 DIAGNOSIS — J44.1 COPD EXACERBATION (HCC): Primary | ICD-10-CM

## 2021-08-20 DIAGNOSIS — J44.1 ACUTE EXACERBATION OF CHRONIC OBSTRUCTIVE PULMONARY DISEASE (COPD) (HCC): ICD-10-CM

## 2021-08-20 DIAGNOSIS — F41.9 ANXIETY: ICD-10-CM

## 2021-08-20 LAB
ADENOVIRUS BY PCR: NOT DETECTED
ANION GAP SERPL CALCULATED.3IONS-SCNC: 10 MMOL/L (ref 7–16)
B.E.: -3.8 MMOL/L (ref -3–3)
BASOPHILS ABSOLUTE: 0.04 E9/L (ref 0–0.2)
BASOPHILS RELATIVE PERCENT: 0.4 % (ref 0–2)
BORDETELLA PARAPERTUSSIS BY PCR: NOT DETECTED
BORDETELLA PERTUSSIS BY PCR: NOT DETECTED
BUN BLDV-MCNC: 10 MG/DL (ref 6–23)
C-REACTIVE PROTEIN: 0.8 MG/DL (ref 0–0.4)
CALCIUM SERPL-MCNC: 9.8 MG/DL (ref 8.6–10.2)
CHLAMYDOPHILIA PNEUMONIAE BY PCR: NOT DETECTED
CHLORIDE BLD-SCNC: 103 MMOL/L (ref 98–107)
CO2: 25 MMOL/L (ref 22–29)
COHB: 0.7 % (ref 0–1.5)
CORONAVIRUS 229E BY PCR: NOT DETECTED
CORONAVIRUS HKU1 BY PCR: NOT DETECTED
CORONAVIRUS NL63 BY PCR: NOT DETECTED
CORONAVIRUS OC43 BY PCR: NOT DETECTED
CREAT SERPL-MCNC: 0.6 MG/DL (ref 0.5–1)
CRITICAL: ABNORMAL
DATE ANALYZED: ABNORMAL
DATE OF COLLECTION: ABNORMAL
EKG ATRIAL RATE: 115 BPM
EKG P AXIS: 81 DEGREES
EKG P-R INTERVAL: 176 MS
EKG Q-T INTERVAL: 328 MS
EKG QRS DURATION: 124 MS
EKG QTC CALCULATION (BAZETT): 453 MS
EKG R AXIS: -75 DEGREES
EKG T AXIS: 64 DEGREES
EKG VENTRICULAR RATE: 115 BPM
EOSINOPHILS ABSOLUTE: 0.51 E9/L (ref 0.05–0.5)
EOSINOPHILS RELATIVE PERCENT: 5.5 % (ref 0–6)
GFR AFRICAN AMERICAN: >60
GFR NON-AFRICAN AMERICAN: >60 ML/MIN/1.73
GLUCOSE BLD-MCNC: 167 MG/DL (ref 74–99)
HCO3: 23.8 MMOL/L (ref 22–26)
HCT VFR BLD CALC: 46.7 % (ref 34–48)
HEMOGLOBIN: 15.1 G/DL (ref 11.5–15.5)
HHB: 2.7 % (ref 0–5)
HUMAN METAPNEUMOVIRUS BY PCR: NOT DETECTED
HUMAN RHINOVIRUS/ENTEROVIRUS BY PCR: NOT DETECTED
IMMATURE GRANULOCYTES #: 0.04 E9/L
IMMATURE GRANULOCYTES %: 0.4 % (ref 0–5)
INFLUENZA A BY PCR: NOT DETECTED
INFLUENZA B BY PCR: NOT DETECTED
LAB: ABNORMAL
LYMPHOCYTES ABSOLUTE: 1.2 E9/L (ref 1.5–4)
LYMPHOCYTES RELATIVE PERCENT: 13 % (ref 20–42)
Lab: ABNORMAL
MCH RBC QN AUTO: 29.9 PG (ref 26–35)
MCHC RBC AUTO-ENTMCNC: 32.3 % (ref 32–34.5)
MCV RBC AUTO: 92.5 FL (ref 80–99.9)
METHB: 0.5 % (ref 0–1.5)
MODE: ABNORMAL
MONOCYTES ABSOLUTE: 0.72 E9/L (ref 0.1–0.95)
MONOCYTES RELATIVE PERCENT: 7.8 % (ref 2–12)
MYCOPLASMA PNEUMONIAE BY PCR: NOT DETECTED
NEUTROPHILS ABSOLUTE: 6.74 E9/L (ref 1.8–7.3)
NEUTROPHILS RELATIVE PERCENT: 72.9 % (ref 43–80)
O2 CONTENT: 22.1 ML/DL
O2 SATURATION: 97.3 % (ref 92–98.5)
O2HB: 96.1 % (ref 94–97)
OPERATOR ID: 3114
PARAINFLUENZA VIRUS 1 BY PCR: NOT DETECTED
PARAINFLUENZA VIRUS 2 BY PCR: NOT DETECTED
PARAINFLUENZA VIRUS 3 BY PCR: NOT DETECTED
PARAINFLUENZA VIRUS 4 BY PCR: NOT DETECTED
PATIENT TEMP: 37 C
PCO2: 52.8 MMHG (ref 35–45)
PDW BLD-RTO: 13 FL (ref 11.5–15)
PH BLOOD GAS: 7.27 (ref 7.35–7.45)
PLATELET # BLD: 210 E9/L (ref 130–450)
PMV BLD AUTO: 11.4 FL (ref 7–12)
PO2: 102.6 MMHG (ref 75–100)
POTASSIUM REFLEX MAGNESIUM: 4.5 MMOL/L (ref 3.5–5)
PROCALCITONIN: 0.11 NG/ML (ref 0–0.08)
RBC # BLD: 5.05 E12/L (ref 3.5–5.5)
RESPIRATORY SYNCYTIAL VIRUS BY PCR: NOT DETECTED
SARS-COV-2, NAAT: NOT DETECTED
SARS-COV-2, PCR: NOT DETECTED
SEDIMENTATION RATE, ERYTHROCYTE: 13 MM/HR (ref 0–20)
SODIUM BLD-SCNC: 138 MMOL/L (ref 132–146)
SOURCE, BLOOD GAS: ABNORMAL
THB: 16.3 G/DL (ref 11.5–16.5)
TIME ANALYZED: 1031
TROPONIN, HIGH SENSITIVITY: 10 NG/L (ref 0–9)
WBC # BLD: 9.3 E9/L (ref 4.5–11.5)

## 2021-08-20 PROCEDURE — 36415 COLL VENOUS BLD VENIPUNCTURE: CPT

## 2021-08-20 PROCEDURE — 6360000002 HC RX W HCPCS: Performed by: INTERNAL MEDICINE

## 2021-08-20 PROCEDURE — 2060000000 HC ICU INTERMEDIATE R&B

## 2021-08-20 PROCEDURE — 96375 TX/PRO/DX INJ NEW DRUG ADDON: CPT

## 2021-08-20 PROCEDURE — 2700000000 HC OXYGEN THERAPY PER DAY

## 2021-08-20 PROCEDURE — 6370000000 HC RX 637 (ALT 250 FOR IP): Performed by: EMERGENCY MEDICINE

## 2021-08-20 PROCEDURE — 6360000002 HC RX W HCPCS: Performed by: STUDENT IN AN ORGANIZED HEALTH CARE EDUCATION/TRAINING PROGRAM

## 2021-08-20 PROCEDURE — 6370000000 HC RX 637 (ALT 250 FOR IP): Performed by: STUDENT IN AN ORGANIZED HEALTH CARE EDUCATION/TRAINING PROGRAM

## 2021-08-20 PROCEDURE — 99285 EMERGENCY DEPT VISIT HI MDM: CPT

## 2021-08-20 PROCEDURE — 6360000002 HC RX W HCPCS: Performed by: EMERGENCY MEDICINE

## 2021-08-20 PROCEDURE — 93005 ELECTROCARDIOGRAM TRACING: CPT | Performed by: STUDENT IN AN ORGANIZED HEALTH CARE EDUCATION/TRAINING PROGRAM

## 2021-08-20 PROCEDURE — 86140 C-REACTIVE PROTEIN: CPT

## 2021-08-20 PROCEDURE — 85651 RBC SED RATE NONAUTOMATED: CPT

## 2021-08-20 PROCEDURE — 84145 PROCALCITONIN (PCT): CPT

## 2021-08-20 PROCEDURE — 2580000003 HC RX 258: Performed by: FAMILY MEDICINE

## 2021-08-20 PROCEDURE — 2580000003 HC RX 258: Performed by: INTERNAL MEDICINE

## 2021-08-20 PROCEDURE — 84484 ASSAY OF TROPONIN QUANT: CPT

## 2021-08-20 PROCEDURE — 6360000002 HC RX W HCPCS

## 2021-08-20 PROCEDURE — 87635 SARS-COV-2 COVID-19 AMP PRB: CPT

## 2021-08-20 PROCEDURE — 94660 CPAP INITIATION&MGMT: CPT

## 2021-08-20 PROCEDURE — 94664 DEMO&/EVAL PT USE INHALER: CPT

## 2021-08-20 PROCEDURE — 6360000004 HC RX CONTRAST MEDICATION: Performed by: RADIOLOGY

## 2021-08-20 PROCEDURE — 6360000002 HC RX W HCPCS: Performed by: FAMILY MEDICINE

## 2021-08-20 PROCEDURE — 82805 BLOOD GASES W/O2 SATURATION: CPT

## 2021-08-20 PROCEDURE — 96365 THER/PROPH/DIAG IV INF INIT: CPT

## 2021-08-20 PROCEDURE — 94640 AIRWAY INHALATION TREATMENT: CPT

## 2021-08-20 PROCEDURE — 80048 BASIC METABOLIC PNL TOTAL CA: CPT

## 2021-08-20 PROCEDURE — 93010 ELECTROCARDIOGRAM REPORT: CPT | Performed by: INTERNAL MEDICINE

## 2021-08-20 PROCEDURE — 85025 COMPLETE CBC W/AUTO DIFF WBC: CPT

## 2021-08-20 PROCEDURE — 0202U NFCT DS 22 TRGT SARS-COV-2: CPT

## 2021-08-20 PROCEDURE — 71275 CT ANGIOGRAPHY CHEST: CPT

## 2021-08-20 PROCEDURE — 71045 X-RAY EXAM CHEST 1 VIEW: CPT

## 2021-08-20 PROCEDURE — 6360000002 HC RX W HCPCS: Performed by: NURSE PRACTITIONER

## 2021-08-20 PROCEDURE — 96366 THER/PROPH/DIAG IV INF ADDON: CPT

## 2021-08-20 PROCEDURE — 6370000000 HC RX 637 (ALT 250 FOR IP): Performed by: FAMILY MEDICINE

## 2021-08-20 PROCEDURE — 2500000003 HC RX 250 WO HCPCS: Performed by: INTERNAL MEDICINE

## 2021-08-20 RX ORDER — METHYLPREDNISOLONE SODIUM SUCCINATE 125 MG/2ML
60 INJECTION, POWDER, LYOPHILIZED, FOR SOLUTION INTRAMUSCULAR; INTRAVENOUS DAILY
Status: DISCONTINUED | OUTPATIENT
Start: 2021-08-20 | End: 2021-08-20

## 2021-08-20 RX ORDER — POLYETHYLENE GLYCOL 3350 17 G/17G
17 POWDER, FOR SOLUTION ORAL DAILY PRN
Status: DISCONTINUED | OUTPATIENT
Start: 2021-08-20 | End: 2021-08-25 | Stop reason: HOSPADM

## 2021-08-20 RX ORDER — GUAIFENESIN/DEXTROMETHORPHAN 100-10MG/5
5 SYRUP ORAL ONCE
Status: COMPLETED | OUTPATIENT
Start: 2021-08-20 | End: 2021-08-20

## 2021-08-20 RX ORDER — SODIUM CHLORIDE 0.9 % (FLUSH) 0.9 %
5-40 SYRINGE (ML) INJECTION PRN
Status: DISCONTINUED | OUTPATIENT
Start: 2021-08-20 | End: 2021-08-25 | Stop reason: HOSPADM

## 2021-08-20 RX ORDER — ATORVASTATIN CALCIUM 20 MG/1
20 TABLET, FILM COATED ORAL DAILY
Status: DISCONTINUED | OUTPATIENT
Start: 2021-08-20 | End: 2021-08-25 | Stop reason: HOSPADM

## 2021-08-20 RX ORDER — BUDESONIDE 0.5 MG/2ML
0.5 INHALANT ORAL 2 TIMES DAILY
Status: DISCONTINUED | OUTPATIENT
Start: 2021-08-20 | End: 2021-08-25 | Stop reason: HOSPADM

## 2021-08-20 RX ORDER — ONDANSETRON 4 MG/1
4 TABLET, ORALLY DISINTEGRATING ORAL EVERY 8 HOURS PRN
Status: DISCONTINUED | OUTPATIENT
Start: 2021-08-20 | End: 2021-08-25 | Stop reason: HOSPADM

## 2021-08-20 RX ORDER — SODIUM CHLORIDE FOR INHALATION 0.9 %
3 VIAL, NEBULIZER (ML) INHALATION ONCE
Status: COMPLETED | OUTPATIENT
Start: 2021-08-20 | End: 2021-08-20

## 2021-08-20 RX ORDER — METHYLPREDNISOLONE SODIUM SUCCINATE 40 MG/ML
40 INJECTION, POWDER, LYOPHILIZED, FOR SOLUTION INTRAMUSCULAR; INTRAVENOUS EVERY 8 HOURS
Status: DISCONTINUED | OUTPATIENT
Start: 2021-08-20 | End: 2021-08-20

## 2021-08-20 RX ORDER — LEVALBUTEROL 1.25 MG/.5ML
1.25 SOLUTION, CONCENTRATE RESPIRATORY (INHALATION) ONCE
Status: COMPLETED | OUTPATIENT
Start: 2021-08-20 | End: 2021-08-20

## 2021-08-20 RX ORDER — SODIUM CHLORIDE 9 MG/ML
25 INJECTION, SOLUTION INTRAVENOUS PRN
Status: DISCONTINUED | OUTPATIENT
Start: 2021-08-20 | End: 2021-08-25 | Stop reason: HOSPADM

## 2021-08-20 RX ORDER — MAGNESIUM SULFATE IN WATER 40 MG/ML
2000 INJECTION, SOLUTION INTRAVENOUS ONCE
Status: COMPLETED | OUTPATIENT
Start: 2021-08-20 | End: 2021-08-20

## 2021-08-20 RX ORDER — PANTOPRAZOLE SODIUM 40 MG/1
40 TABLET, DELAYED RELEASE ORAL
Status: DISCONTINUED | OUTPATIENT
Start: 2021-08-21 | End: 2021-08-25 | Stop reason: HOSPADM

## 2021-08-20 RX ORDER — IPRATROPIUM BROMIDE AND ALBUTEROL SULFATE 2.5; .5 MG/3ML; MG/3ML
1 SOLUTION RESPIRATORY (INHALATION) ONCE
Status: DISCONTINUED | OUTPATIENT
Start: 2021-08-20 | End: 2021-08-20

## 2021-08-20 RX ORDER — ALPRAZOLAM 0.25 MG/1
0.5 TABLET ORAL 2 TIMES DAILY PRN
Status: DISCONTINUED | OUTPATIENT
Start: 2021-08-20 | End: 2021-08-25 | Stop reason: HOSPADM

## 2021-08-20 RX ORDER — IPRATROPIUM BROMIDE AND ALBUTEROL SULFATE 2.5; .5 MG/3ML; MG/3ML
3 SOLUTION RESPIRATORY (INHALATION)
Status: DISCONTINUED | OUTPATIENT
Start: 2021-08-20 | End: 2021-08-25 | Stop reason: HOSPADM

## 2021-08-20 RX ORDER — SODIUM CHLORIDE 0.9 % (FLUSH) 0.9 %
5-40 SYRINGE (ML) INJECTION EVERY 12 HOURS SCHEDULED
Status: DISCONTINUED | OUTPATIENT
Start: 2021-08-20 | End: 2021-08-25 | Stop reason: HOSPADM

## 2021-08-20 RX ORDER — DORZOLAMIDE HCL 20 MG/ML
1 SOLUTION/ DROPS OPHTHALMIC 3 TIMES DAILY
Status: DISCONTINUED | OUTPATIENT
Start: 2021-08-20 | End: 2021-08-22

## 2021-08-20 RX ORDER — ACETAMINOPHEN 650 MG/1
650 SUPPOSITORY RECTAL EVERY 6 HOURS PRN
Status: DISCONTINUED | OUTPATIENT
Start: 2021-08-20 | End: 2021-08-25 | Stop reason: HOSPADM

## 2021-08-20 RX ORDER — ARFORMOTEROL TARTRATE 15 UG/2ML
15 SOLUTION RESPIRATORY (INHALATION) 2 TIMES DAILY
Status: DISCONTINUED | OUTPATIENT
Start: 2021-08-20 | End: 2021-08-25 | Stop reason: HOSPADM

## 2021-08-20 RX ORDER — MAGNESIUM SULFATE HEPTAHYDRATE 500 MG/ML
2000 INJECTION, SOLUTION INTRAMUSCULAR; INTRAVENOUS ONCE
Status: DISCONTINUED | OUTPATIENT
Start: 2021-08-20 | End: 2021-08-20 | Stop reason: SDUPTHER

## 2021-08-20 RX ORDER — AMLODIPINE BESYLATE 5 MG/1
5 TABLET ORAL DAILY
Status: DISCONTINUED | OUTPATIENT
Start: 2021-08-20 | End: 2021-08-25 | Stop reason: HOSPADM

## 2021-08-20 RX ORDER — LORAZEPAM 0.5 MG/1
0.5 TABLET ORAL ONCE
Status: COMPLETED | OUTPATIENT
Start: 2021-08-20 | End: 2021-08-20

## 2021-08-20 RX ORDER — METHYLPREDNISOLONE SODIUM SUCCINATE 40 MG/ML
40 INJECTION, POWDER, LYOPHILIZED, FOR SOLUTION INTRAMUSCULAR; INTRAVENOUS EVERY 12 HOURS
Status: COMPLETED | OUTPATIENT
Start: 2021-08-20 | End: 2021-08-23

## 2021-08-20 RX ORDER — ONDANSETRON 2 MG/ML
4 INJECTION INTRAMUSCULAR; INTRAVENOUS EVERY 6 HOURS PRN
Status: DISCONTINUED | OUTPATIENT
Start: 2021-08-20 | End: 2021-08-25 | Stop reason: HOSPADM

## 2021-08-20 RX ORDER — LATANOPROST 50 UG/ML
1 SOLUTION/ DROPS OPHTHALMIC NIGHTLY
Status: DISCONTINUED | OUTPATIENT
Start: 2021-08-20 | End: 2021-08-22

## 2021-08-20 RX ORDER — ACETAMINOPHEN 325 MG/1
650 TABLET ORAL EVERY 6 HOURS PRN
Status: DISCONTINUED | OUTPATIENT
Start: 2021-08-20 | End: 2021-08-25 | Stop reason: HOSPADM

## 2021-08-20 RX ADMIN — IPRATROPIUM BROMIDE AND ALBUTEROL SULFATE 1 AMPULE: .5; 3 SOLUTION RESPIRATORY (INHALATION) at 17:06

## 2021-08-20 RX ADMIN — DORZOLAMIDE HYDROCHLORIDE 1 DROP: 20 SOLUTION/ DROPS OPHTHALMIC at 21:32

## 2021-08-20 RX ADMIN — LEVALBUTEROL 1.25 MG: 1.25 SOLUTION, CONCENTRATE RESPIRATORY (INHALATION) at 07:38

## 2021-08-20 RX ADMIN — AMLODIPINE BESYLATE 5 MG: 5 TABLET ORAL at 18:58

## 2021-08-20 RX ADMIN — HYDROCORTISONE SODIUM SUCCINATE 60 MG: 100 INJECTION, POWDER, FOR SOLUTION INTRAMUSCULAR; INTRAVENOUS at 06:42

## 2021-08-20 RX ADMIN — IPRATROPIUM BROMIDE AND ALBUTEROL SULFATE 1 AMPULE: .5; 3 SOLUTION RESPIRATORY (INHALATION) at 20:17

## 2021-08-20 RX ADMIN — ISODIUM CHLORIDE 3 ML: 0.03 SOLUTION RESPIRATORY (INHALATION) at 07:38

## 2021-08-20 RX ADMIN — Medication 10 ML: at 21:31

## 2021-08-20 RX ADMIN — LATANOPROST 1 DROP: 50 SOLUTION OPHTHALMIC at 21:32

## 2021-08-20 RX ADMIN — WATER 1000 MG: 1 INJECTION INTRAMUSCULAR; INTRAVENOUS; SUBCUTANEOUS at 17:14

## 2021-08-20 RX ADMIN — IPRATROPIUM BROMIDE AND ALBUTEROL SULFATE 3 AMPULE: .5; 3 SOLUTION RESPIRATORY (INHALATION) at 10:50

## 2021-08-20 RX ADMIN — GUAIFENESIN AND DEXTROMETHORPHAN 5 ML: 100; 10 SYRUP ORAL at 08:23

## 2021-08-20 RX ADMIN — IOPAMIDOL 75 ML: 755 INJECTION, SOLUTION INTRAVENOUS at 11:03

## 2021-08-20 RX ADMIN — MAGNESIUM SULFATE HEPTAHYDRATE 2000 MG: 40 INJECTION, SOLUTION INTRAVENOUS at 08:19

## 2021-08-20 RX ADMIN — DOXYCYCLINE 100 MG: 100 INJECTION, POWDER, LYOPHILIZED, FOR SOLUTION INTRAVENOUS at 18:57

## 2021-08-20 RX ADMIN — BUDESONIDE 500 MCG: 0.5 SUSPENSION RESPIRATORY (INHALATION) at 20:17

## 2021-08-20 RX ADMIN — METHYLPREDNISOLONE SODIUM SUCCINATE 60 MG: 125 INJECTION, POWDER, FOR SOLUTION INTRAMUSCULAR; INTRAVENOUS at 10:30

## 2021-08-20 RX ADMIN — ENOXAPARIN SODIUM 40 MG: 40 INJECTION SUBCUTANEOUS at 17:15

## 2021-08-20 RX ADMIN — LORAZEPAM 0.5 MG: 0.5 TABLET ORAL at 10:13

## 2021-08-20 RX ADMIN — METHYLPREDNISOLONE SODIUM SUCCINATE 40 MG: 40 INJECTION, POWDER, LYOPHILIZED, FOR SOLUTION INTRAMUSCULAR; INTRAVENOUS at 17:14

## 2021-08-20 RX ADMIN — IPRATROPIUM BROMIDE AND ALBUTEROL SULFATE 3 AMPULE: .5; 3 SOLUTION RESPIRATORY (INHALATION) at 06:39

## 2021-08-20 RX ADMIN — ARFORMOTEROL TARTRATE 15 MCG: 15 SOLUTION RESPIRATORY (INHALATION) at 20:17

## 2021-08-20 ASSESSMENT — ENCOUNTER SYMPTOMS
RHINORRHEA: 0
CONSTIPATION: 0
DIARRHEA: 1
CHEST TIGHTNESS: 1
COUGH: 1
SORE THROAT: 0
ABDOMINAL PAIN: 0
VOMITING: 0
WHEEZING: 1
ABDOMINAL DISTENTION: 0
EYE REDNESS: 0
EYE PAIN: 0
SINUS PRESSURE: 0
NAUSEA: 0
SINUS PAIN: 0
SHORTNESS OF BREATH: 1

## 2021-08-20 ASSESSMENT — PAIN SCALES - GENERAL
PAINLEVEL_OUTOF10: 0
PAINLEVEL_OUTOF10: 0

## 2021-08-20 NOTE — ACP (ADVANCE CARE PLANNING)
Advance Care Planning   Healthcare Decision Maker:    Primary Decision Maker: Becca Kelly - Child - [phone]    Secondary Decision Maker: Mamadou Leung - Child - [phone]    Today we documented Decision Maker(s) consistent with ACP documents on file.      Electronically signed by STACI Silva on 8/20/21 at 12:26 PM EDT

## 2021-08-20 NOTE — CARE COORDINATION
Social Work / Discharge Planning:    Patient is from home and presented to the emergency room for shortness of breath by ambulance. Social work met with patient and daughter, Abdoul Ferrer and completed initial assessment. Explained Social Work role and discussed transition of care/discharge planning. Patient currently on PAP, daughter was able to answer questions. Patient lives alone in a 1 story home. Prior to admission she was independent and did not require the use of any DME. Patient does have a walker in the home. Daughter reports no history for KEITH but patient did have [address] through the [facility]. Patient did not use oxygen and does not have oxygen in the home. Patient does have nebulizer. Daughter reports patient is not diabetic. PCP is Amanda Lopez MD and preferred pharmacy is [facility] in [facility]. Patient arrived by ambulance and daughter reports at time of discharge patient will most likely be coming home with her and daughter can provide transportation. CM/SW on unit will follow to assist with any discharge needs identified.  Electronically signed by STACI Mcbride on 8/20/21 at 12:24 PM EDT

## 2021-08-20 NOTE — ED PROVIDER NOTES
[facility]  Department of Emergency Medicine     Written by: Prema Ambrocio DO  Patient Name: Kemar Stringer  Attending Provider: Angelina Tristan DO  Admit Date: 2021  5:55 AM  MRN: [id number]                   : 1940        Chief Complaint   Patient presents with    Shortness of Breath     Sob for the past few days, today sob increased , pcp placed on antibiotics for lung infection, left upper lobe removed due to cancer    - Chief complaint    Ms. Serena Amaya is a 79 yo female with a PMHx of lung cancer (post upper left lobe removal), breast cancer, hypothyroidism and COPD who presents to the ED due shortness of breath. Patient presents via EMS due to shortness of breath for the past 3 days. Patient states the shortness of breath came on gradually and began Tuesday morning. She denies any chest pain during this episode but describes a sensation of chest tightness over the past 3 days that has been progressively worsening with her shortness of breath. She states that her dyspnea is aggravated by exertion and has not been relieved by anything. She states it has been constant and present at rest.  Patient says that she has had a productive cough as well over the past few days with increasing yellowish colored sputum. She states she has not had a COPD exacerbations since her lung surgery in 2017. She states she feels as though she may have aspirated on Monday or Tuesday night. Patient denies any recent travel or hospitalizations in the past 90 days. She has been using her rescue inhaler without relief over the past two days. She has had some epigastric abdominal discomfort without pain over the past two days. She was placed on doxycycline on Tuesday, but has also not experienced any relief since starting the antibiotic. She is COVID vaccinated.  Patient denies any fever, chills, nausea, vomiting, chest pain, abdominal pain, urinary changes, numbness or tingling, headaches, or vision changes. Review of Systems   Constitutional: Negative for chills, fatigue and fever. HENT: Negative for congestion, rhinorrhea, sinus pressure, sinus pain, sore throat and tinnitus. Eyes: Negative for pain and redness. Respiratory: Positive for cough, chest tightness, shortness of breath and wheezing. Cardiovascular: Negative for chest pain, palpitations and leg swelling. Gastrointestinal: Positive for diarrhea. Negative for abdominal distention, abdominal pain (Discomfot), constipation, nausea and vomiting. Genitourinary: Negative for dysuria, flank pain, frequency, hematuria and urgency. Musculoskeletal: Negative for joint swelling and myalgias. Skin: Negative for rash and wound. Neurological: Negative for dizziness, syncope, weakness, light-headedness, numbness and headaches. Physical Exam  Constitutional:       General: She is in acute distress. Appearance: Normal appearance. She is normal weight. HENT:      Head: Normocephalic and atraumatic. Right Ear: External ear normal.      Left Ear: External ear normal.      Mouth/Throat:      Mouth: Mucous membranes are moist.      Pharynx: Oropharynx is clear. Eyes:      Extraocular Movements: Extraocular movements intact. Conjunctiva/sclera: Conjunctivae normal.   Cardiovascular:      Rate and Rhythm: Normal rate and regular rhythm. Pulses: Normal pulses. Heart sounds: Normal heart sounds. Pulmonary:      Effort: Tachypnea and respiratory distress present. Breath sounds: Decreased breath sounds (Diffusely) and wheezing (Diffusely) present. Abdominal:      General: Abdomen is flat. Bowel sounds are normal.      Palpations: Abdomen is soft. Tenderness: There is no abdominal tenderness. Musculoskeletal:         General: Normal range of motion. Cervical back: Normal range of motion and neck supple. Skin:     General: Skin is warm and dry.    Neurological:      General: No focal deficit ---------------------------------------------  Past Medical History:  has a past medical history of Anesthesia, Breast cancer ([address] COPD with exacerbation ([facility] Glaucoma, History of colon polyps, Hyperlipidemia, Hypothyroidism, Osteoarthritis, and Pneumonia. Past Surgical History:  has a past surgical history that includes Hysterectomy; Breast surgery; Breast lumpectomy (9/25/2012); other surgical history (11/1/12); Appendectomy; Colonoscopy (8/21/2013); Colonoscopy ([id number]); and eye surgery. Social History:  reports that she quit smoking about 16 years ago. Her smoking use included cigarettes. She has a 22.50 pack-year smoking history. She has never used smokeless tobacco. She reports that she does not drink alcohol and does not use drugs. Family History: family history includes Cancer in her brother, brother, and sister; Cancer (age of onset: 68) in her sister. The patients home medications have been reviewed.     Allergies: Beta adrenergic blockers, Timolol maleate, Aspirin, and Pcn [penicillins]    -------------------------------------------------- RESULTS -------------------------------------------------    LABS:  Results for orders placed or performed during the hospital encounter of 08/20/21   Respiratory Panel, Molecular, with COVID-19 (Restricted: peds pts or suitable admitted adults)    Specimen: Nasopharyngeal   Result Value Ref Range    Adenovirus by PCR Not Detected Not Detected    Bordetella parapertussis by PCR Not Detected Not Detected    Bordetella pertussis by PCR Not Detected Not Detected    Chlamydophilia pneumoniae by PCR Not Detected Not Detected    Coronavirus 229E by PCR Not Detected Not Detected    Coronavirus HKU1 by PCR Not Detected Not Detected    Coronavirus NL63 by PCR Not Detected Not Detected    Coronavirus OC43 by PCR Not Detected Not Detected    SARS-CoV-2, PCR Not Detected Not Detected    Human Metapneumovirus by PCR Not Detected Not Detected    Human Gas, Arterial   Result Value Ref Range    Date Analyzed 20210820     Time Analyzed 1031     Source: Blood Arterial     pH, Blood Gas 7.272 (L) 7.350 - 7.450    PCO2 52.8 (H) 35.0 - 45.0 mmHg    PO2 102.6 (H) 75.0 - 100.0 mmHg    HCO3 23.8 22.0 - 26.0 mmol/L    B.E. -3.8 (L) -3.0 - 3.0 mmol/L    O2 Sat 97.3 92.0 - 98.5 %    O2Hb 96.1 94.0 - 97.0 %    COHb 0.7 0.0 - 1.5 %    MetHb 0.5 0.0 - 1.5 %    O2 Content 22.1 mL/dL    HHb 2.7 0.0 - 5.0 %    tHb (est) 16.3 11.5 - 16.5 g/dL    Mode NC- 3 L     Date Of Collection      Time Collected      Pt Temp 37.0 C     ID [id number]     Lab [id number]     Critical(s) Notified . No Critical Values    EKG 12 Lead   Result Value Ref Range    Ventricular Rate 115 BPM    Atrial Rate 115 BPM    P-R Interval 176 ms    QRS Duration 124 ms    Q-T Interval 328 ms    QTc Calculation (Bazett) 453 ms    P Axis 81 degrees    R Axis -75 degrees    T Axis 64 degrees       RADIOLOGY:  CTA PULMONARY W CONTRAST   Final Result   1. There is no evidence of a pulmonary embolus   2. The lungs are clear. There is no focal infiltrate, mass or nodule. 3. There is no pleural effusion or pericardial effusion. 4. Moderate calcified plaque seen within the LAD. XR CHEST PORTABLE   Final Result   No acute process             EKG:  This EKG is signed and interpreted by me. Rate: 115  Rhythm: Sinus  Interpretation: sinus tachycardia and bifasicular block  Comparison: stable as compared to patient's most recent EKG      ------------------------- NURSING NOTES AND VITALS REVIEWED ---------------------------  Date / Time Roomed:  8/20/2021  5:55 AM  ED Bed Assignment:  12/12    The nursing notes within the ED encounter and vital signs as below have been reviewed.      Patient Vitals for the past 24 hrs:   BP Temp Temp src Pulse Resp SpO2 Height Weight   08/20/21 1045 -- -- -- -- 25 -- -- --   08/20/21 1041 (!) 210/105 -- -- 119 28 97 % -- --   08/20/21 1032 (!) 210/107 -- -- 121 28 97 % -- -- 08/20/21 0758 (!) 207/94 -- -- 124 28 95 % -- --   08/20/21 0644 (!) 168/86 -- -- 113 28 98 % -- --   08/20/21 0639 -- -- -- -- 24 93 % -- --   08/20/21 0619 (!) 207/98 -- -- 112 28 93 % -- --   08/20/21 0604 (!) 204/104 97.4 °F (36.3 °C) Oral 115 (!) 32 94 % 5' 3.5\" (1.613 m) 169 lb (76.7 kg)       Oxygen Saturation Interpretation: Improved after treatment    ------------------------------------------ PROGRESS NOTES ------------------------------------------  Re-evaluation(s):  Time: 1300  Patients symptoms show no change  Repeat physical examination is improved    Counseling:  I have spoken with the patient and discussed todays results, in addition to providing specific details for the plan of care and counseling regarding the diagnosis and prognosis. Their questions are answered at this time and they are agreeable with the plan of admission.    --------------------------------- ADDITIONAL PROVIDER NOTES ---------------------------------  Consultations:  Time: 1225. Spoke with Dr. Anabella Fuentes. Discussed case. They will admit the patient. This patient's ED course included: a personal history and physicial examination, re-evaluation prior to disposition, multiple bedside re-evaluations, IV medications, cardiac monitoring, continuous pulse oximetry and complex medical decision making and emergency management    This patient has remained hemodynamically stable during their ED course. Diagnosis:  1. COPD exacerbation ([facility]        Disposition:  Patient's disposition: Admit to intermediate  Patient's condition is stable. Patient was seen and evaluated by myself and my attending Marco Linares DO. Assessment and Plan discussed with attending provider, please see attestation for final plan of care.      DO Wolfgang Lyn DO  Resident  08/20/21 [id number]

## 2021-08-20 NOTE — ED NOTES
Bed: 12  Expected date:   Expected time:   Means of arrival:   Comments:  EMS SOB     Anel Rider RN  65/38/89 [id number]

## 2021-08-20 NOTE — CONSULTS
Cynthia Hernandez M.D.,[facility]  Irais Russell D.O., F.A.C.O.I., Micheline Yancey M.D. Julia Membreno M.D. Mick Kauffman D.O. Patient:  Nia Arthur 78 y.o. female MRN: [id number]     Date of Service: 8/20/2021      PULMONARY CONSULTATION    Reason for Consultation: COPD exacerbation  Referring Physician: Dr. Eboni Thrasher MD    Communication with the referring physician will be sent via the electronic medical record. Chief Complaint: Shortness of breath    CODE STATUS: Full    SUBJECTIVE:  HPI:  Nia Arthur is a 78 y.o.  female who we are asked to evaluate for COPD exacerbation. She was previously followed by Dr. Julia Membreno last seen 2016 for  lung nodule and COPD. She now follows with [facility] for her history of lung cancer . Her past medical history includes stage  IB adenocarcinoma lung cancer(acinar 50%, lepidic 40%; WT EGFR and ALK) with VATS upper left lobe removal 2017 by Dr. Clair Altamirano last seen in office May 2021, right breast cancer with resection and radiation, right upper lobe lung nodule stable since 2015, hyperthyroidism, and COPD. She follows with Dr. Osman aMtias pulmonology at [facility]. Her home pulmonary regimen includes albuterol HFA for rescue and Trelegy Ellipta daily. Last PFT testing in [facility] June 2019 with mild obstruction no significant bronchodilator response. She presented to the ED at [facility] today with a 3-day history of worsening shortness of breath. No chest pain or syncope. She admits to mild chest tightness with productive cough yellow-tinged sputum. No fevers. Her COPD she states has been relatively stable since her lung surgery 2017. She states she feels as though she may have aspirated on gastric contents due to her GERD symptoms on Monday or Tuesday night. Patient denies any recent travel or hospitalizations in the past 90 days. She has been using her rescue inhaler without relief over the past two days.  She has had some epigastric abdominal discomfort without pain over the past two days. She was placed on doxycycline on Tuesday by her PCP, but has also not experienced any relief since starting the antibiotic. She is vaccinated against COVID-19. Upon arrival for complaining of dyspnea with respiratory distress. She was using accessory muscles, placed on oxygen 3 L nasal cannula for comfort. BP was significantly elevated 204/104. No fevers documented. Chest x-ray with no acute process. CTA chest with no evidence of PE. No evidence of mediastinal lymphadenopathy. Lungs without acute process. No pleural effusion or pneumothorax. Moderate calcified plaque seen within the LAD. Lab testing-ABG 7.27/52/102/23/1 is 3.8/97% on 3 L nasal cannula, sodium 138, potassium 4.5, BUN 10 creatinine 0.6, high-sensitivity troponin 10, WBCs 9.3, hemoglobin 15.1. Respiratory film array negative. Pro bnp, crp, sed rate, procal pending. Currently on Bipap 12/6 50% alert and conversing. Asking to have ice and water, stating her mouth is dry. Daughter at bedside, questions answered. States breathing is slightly improved since admission. Patient was having a lot of anxiety, prior dose of ativan x 1 given.       Past Medical History:   Diagnosis Date    Anesthesia     wakes up very slowly and has lasting drowsiness    Breast cancer ([address]     breast    COPD with exacerbation ([address] 11/14/2015    Glaucoma     History of colon polyps     Hyperlipidemia     Hypothyroidism     Osteoarthritis     Pneumonia        Past Surgical History:   Procedure Laterality Date    APPENDECTOMY      BREAST LUMPECTOMY  9/25/2012    Right    BREAST SURGERY      left breast > benign 40 + years ago    COLONOSCOPY  8/21/2013    COLONOSCOPY  [id number]    EYE SURGERY      HYSTERECTOMY      CARMELITA    OTHER SURGICAL HISTORY  11/1/12    r needle localized axillary sentinel lymph node exision       Family History   Problem Relation Age of Onset    Cancer Sister pancreatic    Cancer Brother         prostate    Cancer Brother         pancreatic    Cancer Sister 68        breast       Social History:   Social History     Socioeconomic History    Marital status:      Spouse name: Not on file    Number of children: Not on file    Years of education: Not on file    Highest education level: Not on file   Occupational History    Occupation: homemaker   Tobacco Use    Smoking status: Former Smoker     Packs/day: 0.50     Years: 45.00     Pack years: 22.50     Types: Cigarettes     Quit date: 2005     Years since quittin.6    Smokeless tobacco: Never Used   Vaping Use    Vaping Use: Never used   Substance and Sexual Activity    Alcohol use: No     Alcohol/week: 0.0 standard drinks     Comment: socially    Drug use: No    Sexual activity: Not Currently   Other Topics Concern    Not on file   Social History Narrative    Not on file     Social Determinants of Health     Financial Resource Strain: Low Risk     Difficulty of Paying Living Expenses: Not hard at all   Food Insecurity: No Food Insecurity    Worried About [address] [place] [address] in the Last Year: Never true    Eddie of Food in the Last Year: Never true   Transportation Needs: No Transportation Needs    Lack of Transportation (Medical): No    Lack of Transportation (Non-Medical):  No   Physical Activity:     Days of Exercise per Week:     Minutes of Exercise per Session:    Stress:     Feeling of Stress :    Social Connections:     Frequency of Communication with Friends and Family:     Frequency of Social Gatherings with Friends and Family:     Attends Jewish Services:     Active Member of Clubs or Organizations:     Attends Club or Organization Meetings:     Marital Status:    Intimate Partner Violence:     Fear of Current or Ex-Partner:     Emotionally Abused:     Physically Abused:     Sexually Abused:      Smoking history: The patient is a former smoker 22.5 pk yrs in remission  ETOH:   reports no history of alcohol use. Exposures: There  is not history of TB or TB exposure. There is not asbestos or silica dust exposure. The patient reports does not have coal, foundry, quarry or [employer] exposure. Recent travel history none. There is not  history of recreational or IV drug use. There is not hot tub exposure. The patient does not have any exotic pets, turtles or exotic birds.        Vaccines:       Immunization History   Administered Date(s) Administered    COVID-19, Lew Marvin, PF, 30mcg/0.3mL 01/25/2021, 02/15/2021    Influenza, High Dose (Fluzone 65 yrs and older) 10/03/2014, 10/07/2015, 10/04/2016, 09/11/2017, 09/24/2018    Influenza, Quadv, adjuvanted, 65 yrs +, IM, PF (Fluad) 09/29/2020    Influenza, Triv, inactivated, subunit, adjuvanted, IM (Fluad 65 yrs and older) 09/27/2019    Pneumococcal Conjugate 13-valent ([id number]) 10/07/2015    Pneumococcal Polysaccharide ([id number]) 10/08/2007    Zoster Recombinant (Shingrix) 02/19/2019, 05/13/2019        Home Meds: Medications Prior to Admission: doxycycline hyclate (VIBRA-TABS) 100 MG tablet, Take 1 tablet by mouth 2 times daily (with meals) for 10 days  fluticasone-umeclidin-vilant (TRELEGY ELLIPTA) 100-62.5-25 MCG/INH AEPB, Inhale 1 puff into the lungs daily  Handicap Placard MISC, by Does not apply route Patient cannot walk 200 ft without stopping Duration: 5 years  simvastatin (ZOCOR) 40 MG tablet, Take 1 tablet by mouth nightly  levothyroxine (SYNTHROID) 137 MCG tablet, Take 1 tablet by mouth daily  albuterol sulfate HFA (PROAIR HFA) 108 (90 Base) MCG/ACT inhaler, Inhale 2 puffs into the lungs every 6 hours as needed for Wheezing  dorzolamide (TRUSOPT) 2 % ophthalmic solution, INSTILL 1 DROP INTO EACH EYE 3 TIMES A DAY  albuterol (PROVENTIL) (2.5 MG/3ML) 0.083% nebulizer solution, Take 3 mLs by nebulization every 6 hours as needed for Wheezing  Handicap Placard MISC, by Does not apply route 1 year  triamcinolone (NASACORT ALLERGY 24HR) 55 MCG/ACT nasal inhaler, 2 sprays by Each Nostril route daily  omeprazole (PRILOSEC) 20 MG delayed release capsule, Take 40 mg by mouth daily   Elastic Bandages & Supports (JOBST KNEE HIGH COMPRESSION SM) MISC, Compression stockings 20-30 mm hg pantyhose high bilaterally Dx : Venous Insufficiency, Swelling  latanoprost (XALATAN) 0.005 % ophthalmic solution, Place 1 drop into the right eye nightly     CURRENT MEDS :  Scheduled Meds:   ipratropium-albuterol  3 ampule Inhalation Q4H WA    methylPREDNISolone  60 mg Intravenous Daily    dorzolamide  1 drop Both Eyes TID    latanoprost  1 drop Right Eye Nightly    levothyroxine  137 mcg Oral Daily    [START ON 8/21/2021] pantoprazole  40 mg Oral QAM AC    atorvastatin  20 mg Oral Daily    sodium chloride flush  5-40 mL Intravenous 2 times per day    enoxaparin  40 mg Subcutaneous Daily    cefTRIAXone (ROCEPHIN) IV  1,000 mg Intravenous Q24H    budesonide  0.5 mg Nebulization BID    ipratropium-albuterol  1 ampule Inhalation Once       Continuous Infusions:   sodium chloride         Allergies   Allergen Reactions    Beta Adrenergic Blockers Shortness Of Breath     Severe wheezing with timolol    Timolol Maleate Shortness Of Breath    Aspirin      bruising    Pcn [Penicillins] Hives       REVIEW OF SYSTEMS:  Constitutional: Denies fever, weight loss, night sweats, and fatigue  Skin: Denies pigmentation, dark lesions, and rashes   HEENT: Denies hearing loss, tinnitus, ear drainage, epistaxis, sore throat, and hoarseness. Cardiovascular: Denies palpitations, chest pain, and chest pressure.   Respiratory: dyspnea accessory muscle use  Gastrointestinal: Denies nausea, vomiting, poor appetite, diarrhea, heartburn  +reflux episode of aspiration 3 days ago  Genitourinary: Denies dysuria, frequency, urgency or hematuria  Musculoskeletal: Denies myalgias, muscle weakness, and bone pain complaining of leg swelling  Neurological: Denies dizziness, vertigo, headache, and focal weakness  Psychological: Denies anxiety and depression  Endocrine: Denies heat intolerance and cold intolerance  Hematopoietic/Lymphatic: Denies bleeding problems and blood transfusions    OBJECTIVE:   BP (!) 180/96   Pulse 110   Temp 97.4 °F (36.3 °C) (Oral)   Resp 22   Ht 5' 3.5\" (1.613 m)   Wt 169 lb (76.7 kg)   SpO2 97%   BMI 29.47 kg/m²   SpO2 Readings from Last 1 Encounters:   08/20/21 97%        I/O:  No intake or output data in the 24 hours ending 08/20/21 1450  Vent Information  Skin Assessment: Clean, dry, & intact  FiO2 : 60 %  SpO2: 97 %  Mask Type: Full face mask  Mask Size: Medium       IPAP: 16 cmH20  CPAP/EPAP: 12 cmH2O     Physical Exam:  General: The patient is lying in bed comfortably without any distress. Breathing is not labored  HEENT: Pupils are equal round and reactive to light, there are no oral lesions and no post-nasal drip   Neck: supple without adenopathy  Cardiovascular: regular rate and rhythm without murmur or gallop  Respiratory:rhonchi bilaterally to auscultation bilaterally without wheezing or crackles. Air entry is symmetric  Abdomen: soft, non-tender, non-distended, normal bowel sounds  Extremities: warm, no edema, no clubbing  Skin: no rash or lesion  Neurologic: CN II-XII grossly intact, no focal deficits    Pulmonary Function Testing personally reviewed and interpreted  2019 at [facility]  FVC            L   2.77  1.73  2.48  3.27 111.6  2.78 112. 3   0.7   FEV1           L   1.51  1.31  1.88  2.43  80.4  1.56  82.8   3.0   FEV1/FVC       %     55    62    77    90  71.1    56  72.8   2.4   IMPRESSION:   Spirometry indicates mild obstruction. There was not a significant bronchodilator response  Imaging personally reviewed:    CTA chest  FINDINGS:   Pulmonary Arteries: Pulmonary arteries are adequately opacified for   evaluation.  No evidence of intraluminal filling defect to suggest pulmonary   embolism.  Main pulmonary artery is normal in caliber.       Mediastinum: No evidence of mediastinal lymphadenopathy.  The heart is not   enlarged.  There is moderate calcified plaque seen within the LAD.  There is   no pericardial effusion.       Lungs/pleura: The lungs are without acute process.  No focal consolidation or   pulmonary edema.  No evidence of pleural effusion or pneumothorax.       Upper Abdomen: Limited images of the upper abdomen are unremarkable.       Soft Tissues/Bones: No acute bone or soft tissue abnormality.           Impression   1. There is no evidence of a pulmonary embolus   2. The lungs are clear. Tyron Case is no focal infiltrate, mass or nodule. 3. There is no pleural effusion or pericardial effusion. 4. Moderate calcified plaque seen within the LAD.           2D echo 7/20/2018   Summary   Normal diastolic function. Ejection fraction is visually estimated at 55-65%. Normal left atrium. Structurally normal mitral valve. The aortic valve is trileaflet. The aortic valve appears mildly sclerotic. No pulmonary hypertension   No pericardial effusion. Stress test 2017 at [facility]  CONCLUSIONS:    1. SPECT Perfusion Study: Normal.    2. Average functional capacity for age and gender.    3. There is no scintigraphic evidence for inducible ischemia.    4. No evidence of scarred myocardium.    5. Left ventricle is normal in size. The left ventricle systolic   function is normal.    6. Right ventricle is normal in size. The right ventricle systolic   function is normal.    7. This is a low risk scan.     FINDINGS:              Stress IR:3D Gated Stress FBP Gated Rest FBP        LVEF:                    71 %            75 %     Labs:  Lab Results   Component Value Date    WBC 9.3 08/20/2021    HGB 15.1 08/20/2021    HCT 46.7 08/20/2021    MCV 92.5 08/20/2021    MCH 29.9 08/20/2021    MCHC 32.3 08/20/2021    RDW 13.0 08/20/2021     08/20/2021    MPV 11.4 08/20/2021     Lab Results Component Value Date     08/20/2021    K 4.5 08/20/2021     08/20/2021    CO2 25 08/20/2021    BUN 10 08/20/2021    CREATININE 0.6 08/20/2021    LABALBU 4.0 03/15/2021    CALCIUM 9.8 08/20/2021    GFRAA >60 08/20/2021    LABGLOM >60 08/20/2021     Lab Results   Component Value Date    PROTIME 11.4 08/30/2016    INR 1.1 08/30/2016     No results for input(s): PROBNP in the last 72 hours. No results for input(s): TROPONINI in the last 72 hours. No results for input(s): PROCAL in the last 72 hours. This SmartLink has not been configured with any valid records. Micro:  No results for input(s): CULTRESP in the last 72 hours. No results for input(s): LABGRAM in the last 72 hours. No results for input(s): LEGUR in the last 72 hours. No results for input(s): STREPNEUMAGU in the last 72 hours. No results for input(s): LP1UAG in the last 72 hours. Assessment:  1. Acute respiratory failure with hypoxia and hypercapnia  2. COPD with acute exacerbation. Prior PFTs 2019 GOLD stage I mild   3. Hx of Stage Ib adenocarcinoma left lung with VATS left upper lobectomy September 19, 2017 at [facility]  4. History of breast cancer 2012 treated with resection and radiation to the right breast  5. Allergic rhinitis  6. 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 GERD  8. Thyroid disease    Plan:  1. Oxygen therapy 3 L nasal cannula wean to keep greater than 92%  2. Ambulatory O2 testing prior to discharge  3. Noninvasive ventilation in the form of BiPAP 12/6 FIO2 50% as needed for respiratory support due to hypercapnic respiratory failure  4. Follow ABGs  5. CT chest with no infiltrates or effusions. No lymphadenopathy, or masses. Evidence of emphysema, pulmonary cysts, known right upper lobe nodule identified  6. Empiric Rocephin for now and will add doxycycline . Imaging not impressive for pneumonia. Check pro-Robin, trend inflammatory markers, bacterial urine antigens-pending.   Respiratory viral panel negative. If procalcitonin normal can stop anabiotics. 7. Solu-Medrol 40 mg IV BID continue taper as symptoms improve  8. Scheduled bronchodilators-DuoNebs every 4 hours while awake, Pulmicort, add Brovana twice daily  9. DVT, GI prophylaxis   10. Complaining of leg edema, check pro BNP, check 2 D echo  11. Follows at [facility] clinic with Dr. Radha De Santiago and Dr. Gary Leal for regular follow-up regarding her COPD and history of lung cancer. 12. Defer to pcp for anxiolytics    Thank you for allowing me to participate in the care of Luci How. Please feel free to call with questions. This plan of care was reviewed in collaboration with Dr. Gavino Beck    Electronically signed by LIDIA Cronin CNP on 8/20/2021 at 2:50 PM      Note: This report was completed utilizing computer voice recognition software. Every effort has been made to ensure accuracy, however; inadvertent computerized transcription errors may be present      I personally saw, examined and provided care for the patient. Radiographs, labs and medication list were reviewed by me independently. Daughter s at the bedside. They inform me that patient had an episode of aspiration 2 days ago and following that her respiratory status declined and she was having a productive cough. CTA negative for PE and no infiltrates. ABG showed acute hypercapnic respiratory failure , She is currently on BiPAP with significant improvement of her symptoms. Empiric Rocephin for now and will add doxycycline . Respiratory viral panel negative. If procalcitonin normal can stop antibiotics. [place]co follow Pro BNP and ECHO. Will check respiratory cultures. Continue bronchodilators. Follow ABG in a.m. The case was discussed in detail and plans for care were established. Review of CNP documentation was conducted and revisions were made as appropriate. I agree with the above documented exam, problem list and plan of care.     Florecita Bill MD

## 2021-08-21 LAB
AADO2: 202.8 MMHG
B.E.: 0.7 MMOL/L (ref -3–3)
BASOPHILS ABSOLUTE: 0.02 E9/L (ref 0–0.2)
BASOPHILS RELATIVE PERCENT: 0.2 % (ref 0–2)
COHB: 0.4 % (ref 0–1.5)
CRITICAL: NORMAL
DATE ANALYZED: NORMAL
DATE OF COLLECTION: NORMAL
EOSINOPHILS ABSOLUTE: 0 E9/L (ref 0.05–0.5)
EOSINOPHILS RELATIVE PERCENT: 0 % (ref 0–6)
FIO2: 50 %
HCO3: 25.3 MMOL/L (ref 22–26)
HCT VFR BLD CALC: 45.2 % (ref 34–48)
HEMOGLOBIN: 15 G/DL (ref 11.5–15.5)
HHB: 2.3 % (ref 0–5)
IMMATURE GRANULOCYTES #: 0.06 E9/L
IMMATURE GRANULOCYTES %: 0.7 % (ref 0–5)
LAB: NORMAL
LYMPHOCYTES ABSOLUTE: 0.69 E9/L (ref 1.5–4)
LYMPHOCYTES RELATIVE PERCENT: 7.6 % (ref 20–42)
Lab: NORMAL
MCH RBC QN AUTO: 30.1 PG (ref 26–35)
MCHC RBC AUTO-ENTMCNC: 33.2 % (ref 32–34.5)
MCV RBC AUTO: 90.8 FL (ref 80–99.9)
METHB: 0.3 % (ref 0–1.5)
MODE: NORMAL
MONOCYTES ABSOLUTE: 0.28 E9/L (ref 0.1–0.95)
MONOCYTES RELATIVE PERCENT: 3.1 % (ref 2–12)
NEUTROPHILS ABSOLUTE: 8.05 E9/L (ref 1.8–7.3)
NEUTROPHILS RELATIVE PERCENT: 88.4 % (ref 43–80)
O2 CONTENT: 21.3 ML/DL
O2 SATURATION: 97.7 % (ref 92–98.5)
O2HB: 97 % (ref 94–97)
OPERATOR ID: NORMAL
PATIENT TEMP: 37 C
PCO2: 40.6 MMHG (ref 35–45)
PDW BLD-RTO: 12.6 FL (ref 11.5–15)
PEEP/CPAP: 6 CMH2O
PFO2: 1.91 MMHG/%
PH BLOOD GAS: 7.41 (ref 7.35–7.45)
PLATELET # BLD: 235 E9/L (ref 130–450)
PMV BLD AUTO: 11.6 FL (ref 7–12)
PO2: 95.5 MMHG (ref 75–100)
PS: 12 CMH20
RBC # BLD: 4.98 E12/L (ref 3.5–5.5)
RI(T): 212 %
SOURCE, BLOOD GAS: NORMAL
THB: 15.6 G/DL (ref 11.5–16.5)
TIME ANALYZED: 626
WBC # BLD: 9.1 E9/L (ref 4.5–11.5)

## 2021-08-21 PROCEDURE — 36415 COLL VENOUS BLD VENIPUNCTURE: CPT

## 2021-08-21 PROCEDURE — 82805 BLOOD GASES W/O2 SATURATION: CPT

## 2021-08-21 PROCEDURE — 6360000002 HC RX W HCPCS: Performed by: INTERNAL MEDICINE

## 2021-08-21 PROCEDURE — 6370000000 HC RX 637 (ALT 250 FOR IP): Performed by: STUDENT IN AN ORGANIZED HEALTH CARE EDUCATION/TRAINING PROGRAM

## 2021-08-21 PROCEDURE — 6360000002 HC RX W HCPCS: Performed by: NURSE PRACTITIONER

## 2021-08-21 PROCEDURE — 94640 AIRWAY INHALATION TREATMENT: CPT

## 2021-08-21 PROCEDURE — 94660 CPAP INITIATION&MGMT: CPT

## 2021-08-21 PROCEDURE — 2700000000 HC OXYGEN THERAPY PER DAY

## 2021-08-21 PROCEDURE — 2580000003 HC RX 258: Performed by: FAMILY MEDICINE

## 2021-08-21 PROCEDURE — 99222 1ST HOSP IP/OBS MODERATE 55: CPT | Performed by: FAMILY MEDICINE

## 2021-08-21 PROCEDURE — 2580000003 HC RX 258: Performed by: INTERNAL MEDICINE

## 2021-08-21 PROCEDURE — 2060000000 HC ICU INTERMEDIATE R&B

## 2021-08-21 PROCEDURE — 6360000002 HC RX W HCPCS: Performed by: FAMILY MEDICINE

## 2021-08-21 PROCEDURE — 2500000003 HC RX 250 WO HCPCS: Performed by: INTERNAL MEDICINE

## 2021-08-21 PROCEDURE — 6370000000 HC RX 637 (ALT 250 FOR IP): Performed by: FAMILY MEDICINE

## 2021-08-21 PROCEDURE — 6370000000 HC RX 637 (ALT 250 FOR IP): Performed by: INTERNAL MEDICINE

## 2021-08-21 PROCEDURE — 85025 COMPLETE CBC W/AUTO DIFF WBC: CPT

## 2021-08-21 RX ORDER — BENZONATATE 100 MG/1
100 CAPSULE ORAL 3 TIMES DAILY PRN
Status: DISCONTINUED | OUTPATIENT
Start: 2021-08-21 | End: 2021-08-22

## 2021-08-21 RX ORDER — GUAIFENESIN/DEXTROMETHORPHAN 100-10MG/5
5 SYRUP ORAL EVERY 4 HOURS PRN
Status: DISCONTINUED | OUTPATIENT
Start: 2021-08-21 | End: 2021-08-25 | Stop reason: HOSPADM

## 2021-08-21 RX ORDER — GUAIFENESIN 600 MG/1
600 TABLET, EXTENDED RELEASE ORAL 2 TIMES DAILY
Status: DISCONTINUED | OUTPATIENT
Start: 2021-08-21 | End: 2021-08-21

## 2021-08-21 RX ORDER — GUAIFENESIN 400 MG/1
400 TABLET ORAL 3 TIMES DAILY
Status: DISCONTINUED | OUTPATIENT
Start: 2021-08-21 | End: 2021-08-25 | Stop reason: HOSPADM

## 2021-08-21 RX ADMIN — ENOXAPARIN SODIUM 40 MG: 40 INJECTION SUBCUTANEOUS at 09:07

## 2021-08-21 RX ADMIN — Medication 10 ML: at 21:23

## 2021-08-21 RX ADMIN — IPRATROPIUM BROMIDE AND ALBUTEROL SULFATE 1 AMPULE: .5; 3 SOLUTION RESPIRATORY (INHALATION) at 12:38

## 2021-08-21 RX ADMIN — GUAIFENESIN 400 MG: 400 TABLET ORAL at 14:03

## 2021-08-21 RX ADMIN — DOXYCYCLINE 100 MG: 100 INJECTION, POWDER, LYOPHILIZED, FOR SOLUTION INTRAVENOUS at 04:45

## 2021-08-21 RX ADMIN — AMLODIPINE BESYLATE 5 MG: 5 TABLET ORAL at 09:07

## 2021-08-21 RX ADMIN — ARFORMOTEROL TARTRATE 15 MCG: 15 SOLUTION RESPIRATORY (INHALATION) at 07:58

## 2021-08-21 RX ADMIN — LATANOPROST 1 DROP: 50 SOLUTION OPHTHALMIC at 21:13

## 2021-08-21 RX ADMIN — DORZOLAMIDE HYDROCHLORIDE 1 DROP: 20 SOLUTION/ DROPS OPHTHALMIC at 14:03

## 2021-08-21 RX ADMIN — BUDESONIDE 500 MCG: 0.5 SUSPENSION RESPIRATORY (INHALATION) at 20:10

## 2021-08-21 RX ADMIN — LEVOTHYROXINE SODIUM 137 MCG: 25 TABLET ORAL at 09:07

## 2021-08-21 RX ADMIN — DORZOLAMIDE HYDROCHLORIDE 1 DROP: 20 SOLUTION/ DROPS OPHTHALMIC at 09:07

## 2021-08-21 RX ADMIN — METHYLPREDNISOLONE SODIUM SUCCINATE 40 MG: 40 INJECTION, POWDER, LYOPHILIZED, FOR SOLUTION INTRAMUSCULAR; INTRAVENOUS at 17:04

## 2021-08-21 RX ADMIN — BUDESONIDE 500 MCG: 0.5 SUSPENSION RESPIRATORY (INHALATION) at 07:58

## 2021-08-21 RX ADMIN — IPRATROPIUM BROMIDE AND ALBUTEROL SULFATE 1 AMPULE: .5; 3 SOLUTION RESPIRATORY (INHALATION) at 07:57

## 2021-08-21 RX ADMIN — METHYLPREDNISOLONE SODIUM SUCCINATE 40 MG: 40 INJECTION, POWDER, LYOPHILIZED, FOR SOLUTION INTRAMUSCULAR; INTRAVENOUS at 04:41

## 2021-08-21 RX ADMIN — IPRATROPIUM BROMIDE AND ALBUTEROL SULFATE 1 AMPULE: .5; 3 SOLUTION RESPIRATORY (INHALATION) at 20:10

## 2021-08-21 RX ADMIN — ATORVASTATIN CALCIUM 20 MG: 20 TABLET, FILM COATED ORAL at 09:07

## 2021-08-21 RX ADMIN — ARFORMOTEROL TARTRATE 15 MCG: 15 SOLUTION RESPIRATORY (INHALATION) at 20:10

## 2021-08-21 RX ADMIN — IPRATROPIUM BROMIDE AND ALBUTEROL SULFATE 1 AMPULE: .5; 3 SOLUTION RESPIRATORY (INHALATION) at 15:50

## 2021-08-21 RX ADMIN — GUAIFENESIN 400 MG: 400 TABLET ORAL at 21:13

## 2021-08-21 RX ADMIN — GUAIFENESIN AND DEXTROMETHORPHAN 5 ML: 100; 10 SYRUP ORAL at 10:48

## 2021-08-21 ASSESSMENT — PAIN SCALES - GENERAL: PAINLEVEL_OUTOF10: 0

## 2021-08-21 NOTE — H&P
Dashawn Fan  8/20/2021  5:55 AM     1940      78 y.o. Chief Complaint   Patient presents with    Shortness of Breath     Sob for the past few days, today sob increased , pcp placed on antibiotics for lung infection, left upper lobe removed due to cancer       History of Present Illness:    Dashawn Fan is a 78 y.o. female with hx of stage  IB adenocarcinoma lung cancer,  R upper lobectomy, breast cancer, hyperthyroid, and  COPD. She began with cough and sob earlier this week , was placed emperically on doxy and did not improv so presented to the ER  She has mild chest tightness with productive cough yellow-tinged sputum. No fevers. Her COPD . Rescue inhaler is not helping. In er  she was using accessory muscles, placed on oxygen 3 L nasal cannula for comfort. BP was significantly elevated 204/104. No fevers documented. Chest x-ray with no acute process. CTA chest with no evidence of PE    She states in ER she did not feel well until Bipap was placed. Admitted for exacerbation copd and pulm consult    Prior to Admission medications    Medication Sig Start Date End Date Taking?  Authorizing Provider   doxycycline hyclate (VIBRA-TABS) 100 MG tablet Take 1 tablet by mouth 2 times daily (with meals) for 10 days 8/17/21 8/27/21 Yes Caterina George PA-C   fluticasone-umeclidin-vilant (TRELEGY ELLIPTA) 100-62.5-25 MCG/INH AEPB Inhale 1 puff into the lungs daily 5/25/21  Yes Amanda Lopez MD   Handicap Placard MISC by Does not apply route Patient cannot walk 200 ft without stopping Duration: 5 years 5/14/21  Yes Amanda Lopez MD   simvastatin (ZOCOR) 40 MG tablet Take 1 tablet by mouth nightly 3/31/21  Yes Amanda Lopez MD   levothyroxine (SYNTHROID) 137 MCG tablet Take 1 tablet by mouth daily 3/31/21  Yes Amanda Lopez MD   albuterol sulfate HFA (PROAIR HFA) 108 (90 Base) MCG/ACT inhaler Inhale 2 puffs into the lungs every 6 hours as needed for Wheezing 3/31/21  Yes Army Scales MD Cosme   dorzolamide (TRUSOPT) 2 % ophthalmic solution INSTILL 1 DROP INTO EACH EYE 3 TIMES A DAY 11/20/20  Yes Historical Provider, MD   albuterol (PROVENTIL) (2.5 MG/3ML) 0.083% nebulizer solution Take 3 mLs by nebulization every 6 hours as needed for Wheezing 9/29/20  Yes Denis Patterson MD   Maynor Hickman [address] [facility] by Does not apply route 1 year 9/29/20  Yes Denis Patterson MD   triamcinolone (NASACORT ALLERGY 24HR) 55 MCG/ACT nasal inhaler 2 sprays by Each Nostril route daily   Yes Historical Provider, MD   omeprazole (PRILOSEC) 20 MG delayed release capsule Take 40 mg by mouth daily    Yes Historical Provider, MD   Elastic Bandages & Supports (JOBST KNEE HIGH COMPRESSION SM) MISC Compression stockings 20-30 mm hg pantyhose high bilaterally  Dx :  Venous Insufficiency, Swelling 9/17/18  Yes Chivo Álvarez MD   latanoprost (XALATAN) 0.005 % ophthalmic solution Place 1 drop into the right eye nightly    Yes Historical Provider, MD       Current Facility-Administered Medications   Medication Dose Route Frequency Provider Last Rate Last Admin    ipratropium-albuterol (DUONEB) nebulizer solution 3 ampule  3 ampule Inhalation Q4H JAMIE Acosta DO   1 ampule at 08/20/21 2017    iopamidol (ISOVUE-370) 76 % injection 75 mL  75 mL Intravenous ONCE PRN Caterina Adams DO        dorzolamide (TRUSOPT) 2 % ophthalmic solution 1 drop  1 drop Both Eyes TID Denis Patterson MD   1 drop at 08/20/21 2132    latanoprost (XALATAN) 0.005 % ophthalmic solution 1 drop  1 drop Right Eye Nightly Denis Patterson MD   1 drop at 08/20/21 2132    levothyroxine (SYNTHROID) tablet 137 mcg  137 mcg Oral Daily Denis Patterson MD        pantoprazole (PROTONIX) tablet 40 mg  40 mg Oral QAM AC Denis Ptaterson MD        atorvastatin (LIPITOR) tablet 20 mg  20 mg Oral Daily Denis Patterson MD        sodium chloride flush 0.9 % injection 5-40 mL  5-40 mL Intravenous 2 times per day Denis Patterson MD   10 mL at 08/20/21 2131    sodium chloride flush 0.9 % injection 5-40 mL  5-40 mL Intravenous PRN Naya Cantu MD        0.9 % sodium chloride infusion  25 mL Intravenous PRN Naya Cantu MD        ondansetron (ZOFRAN-ODT) disintegrating tablet 4 mg  4 mg Oral Q8H PRN Naya Cantu MD        Or    ondansetron [facility]) injection 4 mg  4 mg Intravenous Q6H PRN Naya Cantu MD        polyethylene glycol (GLYCOLAX) packet 17 g  17 g Oral Daily PRN Naya Cantu MD        enoxaparin (LOVENOX) injection 40 mg  40 mg Subcutaneous Daily Naya Cantu MD   40 mg at 08/20/21 1715    acetaminophen (TYLENOL) tablet 650 mg  650 mg Oral Q6H PRN Naya Cantu MD        Or    acetaminophen (TYLENOL) suppository 650 mg  650 mg Rectal Q6H PRN Naya Cantu MD        cefTRIAXone (ROCEPHIN) 1,000 mg in sterile water 10 mL IV syringe  1,000 mg Intravenous Q24H Naya Cantu MD   1,000 mg at 08/20/21 1714    budesonide (PULMICORT) nebulizer suspension 500 mcg  0.5 mg Nebulization BID Naya Cantu MD   500 mcg at 08/20/21 2017    Arformoterol Tartrate (BROVANA) nebulizer solution 15 mcg  15 mcg Nebulization BID LIDIA Presley CNP   15 mcg at 08/20/21 2017    methylPREDNISolone sodium (SOLU-MEDROL) injection 40 mg  40 mg Intravenous Q12H Sher Rodriguez MD   40 mg at 08/21/21 0441    perflutren lipid microspheres (DEFINITY) injection 1.65 mg  1.5 mL Intravenous ONCE PRN LIDIA Presley CNP        ALPRAZolam Jude Ket) tablet 0.5 mg  0.5 mg Oral BID PRN Naya Cantu MD        amLODIPine (NORVASC) tablet 5 mg  5 mg Oral Daily Naya Cantu MD   5 mg at 08/20/21 3078    doxycycline (VIBRAMYCIN) 100 mg in dextrose 5 % 100 mL IVPB  100 mg Intravenous Q12H Sher Rodriguez MD   Stopped at 08/21/21 5348     Continuous Infusions:   sodium chloride         Allergies   Allergen Reactions    Beta Adrenergic Blockers Shortness Of Breath     Severe wheezing with timolol    Timolol Maleate Shortness Of Breath    Aspirin      bruising    Pcn [Penicillins] Hives         Past Medical History:   Diagnosis Date    Anesthesia     wakes up very slowly and has lasting drowsiness    Breast cancer ([facility] [address]     breast    COPD with exacerbation ([facility] [address] 2015    Glaucoma     History of colon polyps     Hyperlipidemia     Hypothyroidism     Osteoarthritis     Pneumonia          Past Surgical History:   Procedure Laterality Date    APPENDECTOMY      BREAST LUMPECTOMY  2012    Right    BREAST SURGERY      left breast > benign 40 + years ago    COLONOSCOPY  2013    COLONOSCOPY  [id number]    EYE SURGERY      HYSTERECTOMY      CARMELITA    OTHER SURGICAL HISTORY  12    r needle localized axillary sentinel lymph node exision         Social History:   Social History     Socioeconomic History    Marital status:      Spouse name: None    Number of children: None    Years of education: None    Highest education level: None   Occupational History    Occupation: homemaker   Tobacco Use    Smoking status: Former Smoker     Packs/day: 0.50     Years: 45.00     Pack years: 22.50     Types: Cigarettes     Quit date: 2005     Years since quittin.6    Smokeless tobacco: Never Used   Vaping Use    Vaping Use: Never used   Substance and Sexual Activity    Alcohol use: No     Alcohol/week: 0.0 standard drinks     Comment: socially    Drug use: No    Sexual activity: Not Currently   Other Topics Concern    None   Social History Narrative    None     Social Determinants of Health     Financial Resource Strain: Low Risk     Difficulty of Paying Living Expenses: Not hard at all   Food Insecurity: No Food Insecurity    Worried About Running Out of Food in the Last Year: Never true    Ran Out of Food in the Last Year: Never true   Transportation Needs: No Transportation Needs    Lack of Transportation (Medical): No    Lack of Transportation (Non-Medical):  No   Physical Activity:     Days of Exercise per Week: Minutes of Exercise per Session:    Stress:     Feeling of Stress :    Social Connections:     Frequency of Communication with Friends and Family:     Frequency of Social Gatherings with Friends and Family:     Attends Mandae[employer] Services: Active Member of Clubs or Organizations:     Attends Club or Organization Meetings:     Marital Status:    Intimate Partner Violence:     Fear of Current or Ex-Partner:     Emotionally Abused:     Physically Abused:     Sexually Abused:        Family History:   Family History   Problem Relation Age of Onset    Cancer Sister         pancreatic    Cancer Brother         prostate    Cancer Brother         pancreatic    Cancer Sister 68        breast            REVIEW OF SYSTEMS:                            GENERAL No weight change, no fever or chills, no change in appetite, no night sweats    SKIN             No itching,no rashes no lesions,.                                  ENT  No hearing loss, no  Earache ,  No tinnitus, no vertigo,  No facial pressure. No rhinorrhea , no sore throat. No hoarseness, no swollen glands  RESP         See hpi  CARDIO No CP, no palpitations, no syncope,no  murmur,no orthopnea,no  edema, or claudication. GI  No dysphagia,no nausea,no vomiting no diarrhea. No abdominal pain no constipation. No melena.   No dysuria, no frequency, no urgency no hematuria. ENDO   No heat or cold intolerance. No diabetes, no thyroid problems. HEME  No bruising no bleeding problems. [address] SKEL. No joint pain, no joint swelling, no weakness, no tenderness, no cramps, no  back pain, no neck pain   PSYCH. No depression n++anxiety. Not suicidal  NEURO. No headaches,no weakness no dysarthria. No paralysis,no paresthesias,  No vertigo, no seizures, no tumors. No memory loss. Decreased concentration.   No Anhedonia, no sleep disorders. DATA:      Recent Labs     08/20/21  0628   WBC 9.3   HGB 15.1   HCT 46.7   MCV 92.5        Recent Labs     08/20/21  0628      K 4.5      CO2 25   BUN 10   CREATININE 0.6   LABGLOM >60   CALCIUM 9.8     No results for input(s): ALT in the last 72 hours. Invalid input(s):  AST,  ALKPHOS,  BILITOT,  BILIDIR  INR: No results for input(s): INR in the last 72 hours. Invalid input(s): PT/INR    Lab Results   Component Value Date    CRP 0.8 (H) 08/20/2021     Lab Results   Component Value Date    SEDRATE 13 08/20/2021     No results for input(s): POCGLU in the last 72 hours. Lipids: No results for input(s): CHOL, HDL in the last 72 hours. Invalid input(s): LDLCALCU  ABGs: No results found for: PHART, PO2ART, WWK2YXF    Last 3 Troponin:    Lab Results   Component Value Date    TROPONINI <0.01 11/14/2015    TROPONINI <0.01 11/14/2015    TROPONINI <0.01 11/13/2015     TSH:    Lab Results   Component Value Date    TSH 0.222 03/15/2021        No results for input(s): POCGLU in the last 72 hours. U/A:   No results for input(s): NITRITE, COLORU, PHUR, LABCAST, WBCUA, RBCUA, MUCUS, TRICHOMONAS, YEAST, BACTERIA, CLARITYU, SPECGRAV, LEUKOCYTESUR, UROBILINOGEN, BILIRUBINUR, BLOODU, GLUCOSEU, AMORPHOUS in the last 72 hours.     Invalid input(s): Koreen Handler studies:No results found for: FERRITIN  Bone disease:No results found for: PTH, MG, PHOS  Nutrition:No results found for: ALB    Wt Readings from Last 4 Encounters:   08/20/21 169 lb (76.7 kg)   03/31/21 180 lb (81.6 kg)   09/29/20 179 lb (81.2 kg)   09/23/20 178 lb 12.8 oz (81.1 kg)     Vitals:    08/21/21 0446   BP:    Pulse:    Resp: 21   Temp:    SpO2:        Physical exam[de-identified]                General appearance - alert, well appearing, and in no distress, oriented to person, place, and time, and ill-appearing  Mental status - alert, oriented to person, place, and time, normal mood, behavior, speech, dress, motor activity, and thought processes, anxious  Neck - supple, no significant adenopathy  Chest - no tachypnea, retractions or cyanosis, decreased air entry noted all field, rhonchi noted bases2  Heart - normal rate, regular rhythm, normal S1, S2, no murmurs, rubs, clicks or gallops  Abdomen - soft, nontender, nondistended, no masses or organomegaly  Neurological - alert, oriented, normal speech, no focal findings or movement disorder noted}  Extremities - peripheral pulses normal, no pedal edema, no clubbing or cyanosis  Skin - normal coloration and turgor, no rashes, no suspicious skin lesions noted                         Imaging:      Assessment:  Patient Active Problem List   Diagnosis    History of colon polyps    Family history of colon cancer    Personal history of breast cancer    Hypothyroidism    Glaucoma    Hyperlipidemia    Disease of lung    Nodule of left lung    Adenocarcinoma, lung, left (HCC)    Raynaud's phenomenon without gangrene    Venous insufficiency of both lower extremities    Acute exacerbation of chronic obstructive pulmonary disease (COPD) ([address]     1. Acute hypercapneic respiratory failure    2.. exacerbation COPD    PFT June 2019 with mild obstruction no significant bronchodilator response    3. Hypothyroid    4. Glaucoma    5. Hyperlipidemia    6.   Elevated bp   Star amlodipine    Plan:     O2  bipap prn  Rocephin + doxy  IV steroids  Nebulizer      Zhane Melgar MD  7:55 AM  8/21/2021

## 2021-08-21 NOTE — PROGRESS NOTES
Admit Date: 8/20/2021       Subjective:  Chief Complaint   Patient presents with    Shortness of Breath     Sob for the past few days, today sob increased , pcp placed on antibiotics for lung infection, left upper lobe removed due to cancer            Objective:      Doing much better  Had bipap through the night now on 6L NC    On rocephin and doxy due to elevated procalcitonin  Duoneb, brovana, IV steroids    ABG'S NOMRAL    Scheduled Meds:  Current Facility-Administered Medications   Medication Dose Route Frequency Provider Last Rate Last Admin    ipratropium-albuterol (DUONEB) nebulizer solution 3 ampule  3 ampule Inhalation Q4H WA [employer], DO   1 ampule at 08/21/21 0757    iopamidol (ISOVUE-370) 76 % injection 75 mL  75 mL Intravenous ONCE PRN Miquel Julio DO        dorzolamide (TRUSOPT) 2 % ophthalmic solution 1 drop  1 drop Both Eyes TID Anuel Peterson MD   1 drop at 08/21/21 0907    latanoprost (XALATAN) 0.005 % ophthalmic solution 1 drop  1 drop Right Eye Nightly Anuel Peterson MD   1 drop at 08/20/21 2132    levothyroxine (SYNTHROID) tablet 137 mcg  137 mcg Oral Daily Anuel Peterson MD   137 mcg at 08/21/21 0907    pantoprazole (PROTONIX) tablet 40 mg  40 mg Oral QAM AC Anuel Peterson MD        atorvastatin (LIPITOR) tablet 20 mg  20 mg Oral Daily Anuel Peterson MD   20 mg at 08/21/21 4791    sodium chloride flush 0.9 % injection 5-40 mL  5-40 mL Intravenous 2 times per day Anuel Peterson MD   10 mL at 08/20/21 2131    sodium chloride flush 0.9 % injection 5-40 mL  5-40 mL Intravenous PRN Anuel Peterson MD        0.9 % sodium chloride infusion  25 mL Intravenous PRN Anuel Peterson MD        ondansetron (ZOFRAN-ODT) disintegrating tablet 4 mg  4 mg Oral Q8H PRN Anuel Peterson MD        Or    ondansetron [facility]) injection 4 mg  4 mg Intravenous Q6H PRN Anuel Peterson MD        polyethylene glycol (GLYCOLAX) packet 17 g  17 g Oral Daily PRN Anuel Peterson MD        enoxaparin (LOVENOX) injection 40 mg  40 mg Subcutaneous Daily Anuel Peterson MD   40 mg at 08/21/21 9134    acetaminophen (TYLENOL) tablet 650 mg  650 mg Oral Q6H PRN Anuel Peterson MD        Or    acetaminophen (TYLENOL) suppository 650 mg  650 mg Rectal Q6H PRN Anuel Peterson MD        cefTRIAXone (ROCEPHIN) 1,000 mg in sterile water 10 mL IV syringe  1,000 mg Intravenous Q24H Anuel Peterson MD   1,000 mg at 08/20/21 1714    budesonide (PULMICORT) nebulizer suspension 500 mcg  0.5 mg Nebulization BID Anuel Peterson MD   500 mcg at 08/21/21 0758    Arformoterol Tartrate (BROVANA) nebulizer solution 15 mcg  15 mcg Nebulization BID LIDIA Sosa CNP   15 mcg at 08/21/21 0758    methylPREDNISolone sodium (SOLU-MEDROL) injection 40 mg  40 mg Intravenous Q12H Rosangela Nichols MD   40 mg at 08/21/21 0441    perflutren lipid microspheres (DEFINITY) injection 1.65 mg  1.5 mL Intravenous ONCE PRN LIDIA Sosa CNP        ALPRAZolam Rae Sulma) tablet 0.5 mg  0.5 mg Oral BID PRN Anuel Peterson MD        amLODIPine (NORVASC) tablet 5 mg  5 mg Oral Daily Anuel Peterson MD   5 mg at 08/21/21 0907    doxycycline (VIBRAMYCIN) 100 mg in dextrose 5 % 100 mL IVPB  100 mg Intravenous Q12H Rosangela Nichols MD   Stopped at 08/21/21 9929       Continuous Infusions  :   sodium chloride         PRN Meds:  iopamidol, sodium chloride flush, sodium chloride, ondansetron **OR** ondansetron, polyethylene glycol, acetaminophen **OR** acetaminophen, perflutren lipid microspheres, ALPRAZolam      Wt Readings from Last 3 Encounters:   08/20/21 169 lb (76.7 kg)   03/31/21 180 lb (81.6 kg)   09/29/20 179 lb (81.2 kg)     No intake/output data recorded. No intake or output data in the 24 hours ending 08/21/21 1032    Vitals:    08/21/21 0745   BP: 137/76   Pulse: 92   Resp: 18   Temp: 97.4 °F (36.3 °C)   SpO2: 98%       Physical Exam:    Skin:    Warm and dry.   No rash or bruises    Neck:    No JVD, No thyromegaly, No carotid bruit  Cardiac:   RRR, No gallop or murmur  Lungs:  Lungs with debra rhonchi, decreased breath sounds  Abdomen:  Normal bowel sounds, abd soft, non-tender, no rebound or guarding  Extremities:   No clubbing, edema or cyanosis  Neurological:   A & O x 3, Moves all extremities, normal DTR                CBC  Recent Labs     08/20/21  0628 08/21/21  0810   WBC 9.3 9.1   HGB 15.1 15.0   HCT 46.7 45.2   MCV 92.5 90.8    235     BMP  Recent Labs     08/20/21  0628      K 4.5      CO2 25   BUN 10   CREATININE 0.6   LABGLOM >60   CALCIUM 9.8     LFT's  No results for input(s): ALT in the last 72 hours. Invalid input(s):  AST,  ALKPHOS,  BILITOT,  BILIDIR  INR: No results for input(s): INR in the last 72 hours. Lab Results   Component Value Date    CRP 0.8 (H) 08/20/2021     Lab Results   Component Value Date    SEDRATE 13 08/20/2021     No results for input(s): POCGLU in the last 72 hours. Lipids: No results for input(s): CHOL, HDL in the last 72 hours.     Invalid input(s): LDLCALCU  ABGs: No results found for: PHART, PO2ART, QTF8XWT  SpO2 Readings from Last 1 Encounters:   08/21/21 98%       Last 3 Troponin:    Lab Results   Component Value Date    TROPONINI <0.01 11/14/2015    TROPONINI <0.01 11/14/2015    TROPONINI <0.01 11/13/2015     Lab Results   Component Value Date    CKTOTAL 44 11/13/2015    CKMB 2.6 11/13/2015    TROPONINI <0.01 11/14/2015    TROPONINI <0.01 11/14/2015    TROPONINI <0.01 11/13/2015        TSH:    Lab Results   Component Value Date    TSH 0.222 03/15/2021      Vent Information  Skin Assessment: Clean, dry, & intact  FiO2 : 50 %  SpO2: 98 %  SpO2/FiO2 ratio: 198  I Time/ I Time %: 0.9 s  Mask Type: Full face mask  Mask Size: Medium      U/A:     Lab Results   Component Value Date    COLORU Straw 11/13/2015    PHUR 6.0 11/13/2015    WBCUA 0-1 11/13/2015    RBCUA 0-1 11/13/2015    BACTERIA RARE 11/13/2015    CLARITYU

## 2021-08-21 NOTE — PLAN OF CARE
Problem: Respiratory:  Goal: Ability to maintain normal respiratory secretions will improve  Description: Ability to maintain normal respiratory secretions will improve  Outcome: Met This Shift     Problem: Skin Integrity:  Goal: Demonstration of wound healing without infection will improve  Description: Demonstration of wound healing without infection will improve  Outcome: Met This Shift  Goal: Complications related to intravenous access or infusion will be avoided or minimized  Description: Complications related to intravenous access or infusion will be avoided or minimized  Outcome: Met This Shift     Problem: Falls - Risk of:  Goal: Will remain free from falls  Description: Will remain free from falls  Outcome: Met This Shift  Goal: Absence of physical injury  Description: Absence of physical injury  Outcome: Met This Shift

## 2021-08-21 NOTE — PROGRESS NOTES
Lonnie Guillaume M.D. Natalie Keith M.D. Candelario Foreman, M.D. Alejandro Lander, D.O. Daily Pulmonary Progress Note    Patient:  Dashawn Fan 78 y.o. female MRN: [id number]     Date of Service: 8/21/2021        Subjective      Patient was seen and examined. Patient reports feeling somewhat better today. Still having cough and wheezing. Cough is nonproductive. Objective   Vitals: /76   Pulse 92   Temp 97.4 °F (36.3 °C) (Axillary)   Resp 18   Ht 5' 3.5\" (1.613 m)   Wt 169 lb (76.7 kg)   SpO2 98%   BMI 29.47 kg/m²     I/O:  No intake or output data in the 24 hours ending 08/21/21 1232    CURRENT MEDS :  Scheduled Meds:   ipratropium-albuterol  3 ampule Inhalation Q4H WA    dorzolamide  1 drop Both Eyes TID    latanoprost  1 drop Right Eye Nightly    levothyroxine  137 mcg Oral Daily    pantoprazole  40 mg Oral QAM AC    atorvastatin  20 mg Oral Daily    sodium chloride flush  5-40 mL Intravenous 2 times per day    enoxaparin  40 mg Subcutaneous Daily    cefTRIAXone (ROCEPHIN) IV  1,000 mg Intravenous Q24H    budesonide  0.5 mg Nebulization BID    Arformoterol Tartrate  15 mcg Nebulization BID    methylPREDNISolone  40 mg Intravenous Q12H    amLODIPine  5 mg Oral Daily    doxycycline (VIBRAMYCIN) IV  100 mg Intravenous Q12H       Continuous Infusions:   sodium chloride         PRN Meds:  benzonatate, guaiFENesin-dextromethorphan, iopamidol, sodium chloride flush, sodium chloride, ondansetron **OR** ondansetron, polyethylene glycol, acetaminophen **OR** acetaminophen, perflutren lipid microspheres, ALPRAZolam      Physical Exam:  Physical Exam  HENT:      Head: Normocephalic and atraumatic. Eyes:      Conjunctiva/sclera: Conjunctivae normal.   Neck:      Trachea: No tracheal deviation. Cardiovascular:      Rate and Rhythm: Normal rate and regular rhythm. Heart sounds: Normal heart sounds. Pulmonary:      Breath sounds: Wheezing present. Abdominal:      General: Bowel sounds are normal.      Palpations: Abdomen is soft. Tenderness: There is no abdominal tenderness. Musculoskeletal:         General: Swelling present. Cervical back: Neck supple. Lymphadenopathy:      Cervical: No cervical adenopathy. Skin:     General: Skin is warm and dry. Findings: No rash. Neurological:      General: No focal deficit present. Mental Status: She is alert. Psychiatric:         Behavior: Behavior normal.         Pertinent/ New Labs and Imaging Studies     Pulmonary Function Testing personally reviewed and interpreted. PERTINENT LAB RESULTS: Labs reviewed. DIAGNOSTICS: Pertinent imaging reviewed. Assessment:      1. Acute respiratory failure with hypoxia and hypercapnia  2. COPD with acute exacerbation. Prior PFTs 2019 GOLD stage I mild   3. Hx of Stage Ib adenocarcinoma left lung with VATS left upper lobectomy September 19, 2017 at [facility]  4. History of breast cancer 2012 treated with resection and radiation to the right breast  5. Allergic rhinitis  6. 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 GERD  8. Thyroid disease      Plan:      Continue supplemental O2 for SpO2 >88%, check ambulatory pulse ox prior to D/C   Continue NIV with BiPAP 12/6 prn   De-escalate and monitor off antibiotics   Continue Solu-Medrol 40 mg every 12   Aerosol bronchodilators with DuoNebs every 4 while awake, Pulmicort/Brovana   Check proBNP, 2D echo   GI/DVT-Lovenox 40 mg    Follows at [facility] clinic with Dr. Mario George and Dr. Essence Hamlin for regular follow-up regarding her COPD and history of lung cancer. Would like to follow with our group locally. Reviewed with family at bedside. Thank you for allowing me to participate in the care of Marychuy Shah. Please feel free to call with questions.      Electronically signed by Jamal Leon DO on 8/21/2021 at 12:32 PM

## 2021-08-22 ENCOUNTER — APPOINTMENT (OUTPATIENT)
Dept: GENERAL RADIOLOGY | Age: 81
DRG: 190 | End: 2021-08-22
Payer: MEDICARE

## 2021-08-22 LAB
LV EF: 78 %
LVEF MODALITY: NORMAL

## 2021-08-22 PROCEDURE — 6360000002 HC RX W HCPCS: Performed by: NURSE PRACTITIONER

## 2021-08-22 PROCEDURE — 2060000000 HC ICU INTERMEDIATE R&B

## 2021-08-22 PROCEDURE — 71045 X-RAY EXAM CHEST 1 VIEW: CPT

## 2021-08-22 PROCEDURE — 6360000002 HC RX W HCPCS: Performed by: FAMILY MEDICINE

## 2021-08-22 PROCEDURE — 6370000000 HC RX 637 (ALT 250 FOR IP): Performed by: STUDENT IN AN ORGANIZED HEALTH CARE EDUCATION/TRAINING PROGRAM

## 2021-08-22 PROCEDURE — 6360000002 HC RX W HCPCS: Performed by: INTERNAL MEDICINE

## 2021-08-22 PROCEDURE — 99231 SBSQ HOSP IP/OBS SF/LOW 25: CPT | Performed by: FAMILY MEDICINE

## 2021-08-22 PROCEDURE — 94660 CPAP INITIATION&MGMT: CPT

## 2021-08-22 PROCEDURE — 6370000000 HC RX 637 (ALT 250 FOR IP): Performed by: INTERNAL MEDICINE

## 2021-08-22 PROCEDURE — 6370000000 HC RX 637 (ALT 250 FOR IP): Performed by: FAMILY MEDICINE

## 2021-08-22 PROCEDURE — 2580000003 HC RX 258: Performed by: FAMILY MEDICINE

## 2021-08-22 PROCEDURE — 94640 AIRWAY INHALATION TREATMENT: CPT

## 2021-08-22 PROCEDURE — 2700000000 HC OXYGEN THERAPY PER DAY

## 2021-08-22 PROCEDURE — 97161 PT EVAL LOW COMPLEX 20 MIN: CPT

## 2021-08-22 PROCEDURE — 93306 TTE W/DOPPLER COMPLETE: CPT

## 2021-08-22 RX ORDER — LATANOPROST 50 UG/ML
1 SOLUTION/ DROPS OPHTHALMIC DAILY
Status: DISCONTINUED | OUTPATIENT
Start: 2021-08-22 | End: 2021-08-25 | Stop reason: HOSPADM

## 2021-08-22 RX ORDER — DORZOLAMIDE HCL 20 MG/ML
1 SOLUTION/ DROPS OPHTHALMIC 2 TIMES DAILY
Status: DISCONTINUED | OUTPATIENT
Start: 2021-08-22 | End: 2021-08-25 | Stop reason: HOSPADM

## 2021-08-22 RX ORDER — BENZONATATE 100 MG/1
100 CAPSULE ORAL 3 TIMES DAILY
Status: DISCONTINUED | OUTPATIENT
Start: 2021-08-22 | End: 2021-08-24

## 2021-08-22 RX ADMIN — IPRATROPIUM BROMIDE AND ALBUTEROL SULFATE 1 AMPULE: .5; 3 SOLUTION RESPIRATORY (INHALATION) at 12:57

## 2021-08-22 RX ADMIN — BENZONATATE 100 MG: 100 CAPSULE ORAL at 16:38

## 2021-08-22 RX ADMIN — GUAIFENESIN 400 MG: 400 TABLET ORAL at 16:32

## 2021-08-22 RX ADMIN — BENZONATATE 100 MG: 100 CAPSULE ORAL at 21:12

## 2021-08-22 RX ADMIN — LATANOPROST 1 DROP: 50 SOLUTION/ DROPS OPHTHALMIC at 09:57

## 2021-08-22 RX ADMIN — IPRATROPIUM BROMIDE AND ALBUTEROL SULFATE 1 AMPULE: .5; 3 SOLUTION RESPIRATORY (INHALATION) at 20:03

## 2021-08-22 RX ADMIN — METHYLPREDNISOLONE SODIUM SUCCINATE 40 MG: 40 INJECTION, POWDER, LYOPHILIZED, FOR SOLUTION INTRAMUSCULAR; INTRAVENOUS at 16:32

## 2021-08-22 RX ADMIN — BUDESONIDE 500 MCG: 0.5 SUSPENSION RESPIRATORY (INHALATION) at 20:03

## 2021-08-22 RX ADMIN — ARFORMOTEROL TARTRATE 15 MCG: 15 SOLUTION RESPIRATORY (INHALATION) at 08:52

## 2021-08-22 RX ADMIN — ATORVASTATIN CALCIUM 20 MG: 20 TABLET, FILM COATED ORAL at 09:37

## 2021-08-22 RX ADMIN — LEVOTHYROXINE SODIUM 137 MCG: 25 TABLET ORAL at 09:38

## 2021-08-22 RX ADMIN — Medication 10 ML: at 21:00

## 2021-08-22 RX ADMIN — IPRATROPIUM BROMIDE AND ALBUTEROL SULFATE 1 AMPULE: .5; 3 SOLUTION RESPIRATORY (INHALATION) at 08:52

## 2021-08-22 RX ADMIN — DORZOLAMIDE HCL 1 DROP: 20 SOLUTION/ DROPS OPHTHALMIC at 21:24

## 2021-08-22 RX ADMIN — AMLODIPINE BESYLATE 5 MG: 5 TABLET ORAL at 09:38

## 2021-08-22 RX ADMIN — METHYLPREDNISOLONE SODIUM SUCCINATE 40 MG: 40 INJECTION, POWDER, LYOPHILIZED, FOR SOLUTION INTRAMUSCULAR; INTRAVENOUS at 04:40

## 2021-08-22 RX ADMIN — ARFORMOTEROL TARTRATE 15 MCG: 15 SOLUTION RESPIRATORY (INHALATION) at 20:03

## 2021-08-22 RX ADMIN — PANTOPRAZOLE SODIUM 40 MG: 40 TABLET, DELAYED RELEASE ORAL at 05:06

## 2021-08-22 RX ADMIN — Medication 10 ML: at 09:37

## 2021-08-22 RX ADMIN — BUDESONIDE 500 MCG: 0.5 SUSPENSION RESPIRATORY (INHALATION) at 08:52

## 2021-08-22 RX ADMIN — ENOXAPARIN SODIUM 40 MG: 40 INJECTION SUBCUTANEOUS at 09:38

## 2021-08-22 RX ADMIN — IPRATROPIUM BROMIDE AND ALBUTEROL SULFATE 1 AMPULE: .5; 3 SOLUTION RESPIRATORY (INHALATION) at 17:03

## 2021-08-22 RX ADMIN — DORZOLAMIDE HCL 1 DROP: 20 SOLUTION/ DROPS OPHTHALMIC at 09:39

## 2021-08-22 RX ADMIN — GUAIFENESIN 400 MG: 400 TABLET ORAL at 21:12

## 2021-08-22 RX ADMIN — GUAIFENESIN 400 MG: 400 TABLET ORAL at 09:38

## 2021-08-22 NOTE — PROGRESS NOTES
Physical Therapy    Facility/Department: [address] INTERNAL MEDICINE 2  Initial Assessment    NAME: Alejandra Lagos  : 1940  MRN: [id number]    Date of Service: 2021      Patient Diagnosis(es): The encounter diagnosis was COPD exacerbation ([facility] has a past medical history of Anesthesia, Breast cancer ([facility] COPD with exacerbation ([facility] Glaucoma, History of colon polyps, Hyperlipidemia, Hypothyroidism, Osteoarthritis, and Pneumonia. has a past surgical history that includes Hysterectomy; Breast surgery; Breast lumpectomy (2012); other surgical history (12); Appendectomy; Colonoscopy (2013); Colonoscopy ([id number]); and eye surgery. Evaluating Therapist: Abel Castorena PT      Room #:  3999/1666-O  Diagnosis:  Acute exacerbation of chronic obstructive pulmonary disease (COPD) ([facility] [J44.1]  COPD exacerbation (HCC) [J44.1]  PMHx/PSHx:  Breast CA, COPD, Lung CA  Precautions:  Falls, O2      Social:  Pt lives alone but plans to discharge to one of her daughters homes. Both are one floor with 1-2 steps to enter. .  Prior to admission Independent without device. Has ww from previous surgery. Initial Evaluation  Date: 21 Treatment      Short Term/ Long Term   Goals   Was pt agreeable to Eval/treatment? yes     Does pt have pain? no     Bed Mobility  Rolling: min assist  Supine to sit: NT  Sit to supine: Min assist  Scooting: min assist  SBA   Transfers Sit to stand: min assist  Stand to sit: min assist  Stand pivot: NT  SBA   Ambulation    25 feet with ww with mod asssit  100 feet with ww with CGA   Stair Negotiation  Ascended and descended  NT   2 steps with 1 rail with CGA   LE strength     3+/5    4-/5   balance      Fair-     AM-PAC Raw score               15/24         Pt is alert and Oriented   LE ROM: WFl  Sensation: intact  Edema: none  Endurance: poor     ASSESSMENT:    Pt displays functional ability as noted in the objective portion of this evaluation.       Patient education  Pt educated on PT objectives    Patient response to education:   Pt verbalized understanding Pt demonstrated skill Pt requires further education in this area   yes           Comments:  Pt unsteady with ambulation, decreased step length. High risk for falls. Pt fatigues quickly with mobility. Pt's/ family goals   1. To get stronger    Conditions Requiring Skilled Therapeutic Intervention:    [x]Decreased strength     []Decreased ROM  [x]Decreased functional mobility  [x]Decreased balance   [x]Decreased endurance   []Decreased posture  []Decreased sensation  []Decreased coordination   []Decreased vision  []Decreased safety awareness   []Increased pain       Patient and or family understand(s) diagnosis, prognosis, and plan of care. Prognosis is good for reaching above PT goals    PHYSICAL THERAPY PLAN OF CARE:    PT POC is established based on physician order and patient diagnosis     Referring provider/PT Order: Imani Alcantar,       Current Treatment Recommendations:     [x] Strengthening to improve independence with functional mobility   [] ROM to improve independence with functional mobility   [x] Balance Training to improve static/dynamic balance and to reduce fall risk  [x] Endurance Training to improve activity tolerance during functional mobility   [x] Transfer Training to improve safety and independence with all functional transfers   [x] Gait Training to improve gait mechanics, endurance and assess need for appropriate assistive device  [x] Stair Training in preparation for safe discharge home and/or into the community   [] Positioning to prevent skin breakdown and contractures  [x] Safety and Education Training   [x] Patient/Caregiver Education   [] HEP  [] Other     PT long term treatment goals are located in above grid    Frequency of treatments: 2-5x/week x 5-7 days.     Time in  1045  Time out  1100        Evaluation Time includes thorough review of current medical information, gathering information on past medical history/social history and prior level of function, completion of standardized testing/informal observation of tasks, assessment of data and education on plan of care and goals.       CPT codes:  [x] Low Complexity PT evaluation 73900  [] Moderate Complexity PT evaluation 41187  [] High Complexity PT evaluation 27681  [] PT Re-evaluation 26809  [] Gait training 00950 minutes  [] Manual therapy 25622 minutes  [] Therapeutic activities 87145 minutes  [] Therapeutic exercises 49389 minutes  [] Neuromuscular reeducation 05293 minutes     [facility] PT [id number]

## 2021-08-22 NOTE — PROGRESS NOTES
Date: 8/21/2021    Time: 10:49 PM    Patient Placed On BIPAP/CPAP/ Non-Invasive Ventilation? Yes    If no must comment. Facial area red/color change? No           If YES are Blister/Lesion present? No   If yes must notify nursing staff  BIPAP/CPAP skin barrier? Yes    Skin barrier type:mepilexlite       Comments:    Red outlet plugged in.      Link Altman RCP

## 2021-08-22 NOTE — PROGRESS NOTES
VADIM Ann Dorina Hidalgo, M.D. Suzzanne Oats, M.D. Louis Jackson D.O. Daily Pulmonary Progress Note    Patient:  Margarita Bee 78 y.o. female MRN: [id number]     Date of Service: 8/22/2021        Subjective      Patient was seen and examined. Patient reports feeling somewhat better today. Still having cough and wheezing. Cough is nonproductive. Objective   Vitals: /68   Pulse 99   Temp 97.1 °F (36.2 °C) (Temporal)   Resp 20   Ht 5' 3.5\" (1.613 m)   Wt 173 lb (78.5 kg)   SpO2 97%   BMI 30.16 kg/m²     I/O:    Intake/Output Summary (Last 24 hours) at 8/22/2021 1602  Last data filed at 8/22/2021 7209  Gross per 24 hour   Intake 180 ml   Output 1200 ml   Net -1020 ml       CURRENT MEDS :  Scheduled Meds:   dorzolamide  1 drop Both Eyes BID    latanoprost  1 drop Both Eyes Daily    benzonatate  100 mg Oral TID    guaiFENesin  400 mg Oral TID    ipratropium-albuterol  3 ampule Inhalation Q4H WA    levothyroxine  137 mcg Oral Daily    pantoprazole  40 mg Oral QAM AC    atorvastatin  20 mg Oral Daily    sodium chloride flush  5-40 mL Intravenous 2 times per day    enoxaparin  40 mg Subcutaneous Daily    budesonide  0.5 mg Nebulization BID    Arformoterol Tartrate  15 mcg Nebulization BID    methylPREDNISolone  40 mg Intravenous Q12H    amLODIPine  5 mg Oral Daily       Continuous Infusions:   sodium chloride         PRN Meds:  guaiFENesin-dextromethorphan, iopamidol, sodium chloride flush, sodium chloride, ondansetron **OR** ondansetron, polyethylene glycol, acetaminophen **OR** acetaminophen, perflutren lipid microspheres, ALPRAZolam      Physical Exam:  Physical Exam  HENT:      Head: Normocephalic and atraumatic. Eyes:      Conjunctiva/sclera: Conjunctivae normal.   Neck:      Trachea: No tracheal deviation. Cardiovascular:      Rate and Rhythm: Normal rate and regular rhythm. Heart sounds: Normal heart sounds.    Pulmonary: Breath sounds: Wheezing present. Abdominal:      General: Bowel sounds are normal.      Palpations: Abdomen is soft. Tenderness: There is no abdominal tenderness. Musculoskeletal:         General: Swelling present. Cervical back: Neck supple. Lymphadenopathy:      Cervical: No cervical adenopathy. Skin:     General: Skin is warm and dry. Findings: No rash. Neurological:      General: No focal deficit present. Mental Status: She is alert. Psychiatric:         Behavior: Behavior normal.         Pertinent/ New Labs and Imaging Studies     Pulmonary Function Testing personally reviewed and interpreted. PERTINENT LAB RESULTS: Labs reviewed. DIAGNOSTICS: Pertinent imaging reviewed. Assessment:      1. Acute respiratory failure with hypoxia and hypercapnia  2. COPD with acute exacerbation. Prior PFTs 2019 GOLD stage I mild   3. Hx of Stage Ib adenocarcinoma left lung with VATS left upper lobectomy September 19, 2017 at [facility]  4. History of breast cancer 2012 treated with resection and radiation to the right breast  5. Allergic rhinitis  6. 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 GERD  8. Thyroid disease      Plan:      Continue supplemental O2 for SpO2 >88%, check ambulatory pulse ox prior to D/C   Continue NIV with BiPAP 12/6 prn   De-escalate and monitor off antibiotics   Continue Solu-Medrol 40 mg every 12   Aerosol bronchodilators with DuoNebs every 4 while awake, Pulmicort/Brovana   GI/DVT-Lovenox 40 mg    Follow[facility] with Dr. Eagle Butterfield and Dr. William Ruiz for regular follow-up regarding her COPD and history of lung cancer. Would like to follow with our group locally. Reviewed with family at bedside. Thank you for allowing me to participate in the care of Tessa Kaur. Please feel free to call with questions.      Electronically signed by Sabrina Green DO on 8/22/2021 at 4:02 PM

## 2021-08-22 NOTE — PROGRESS NOTES
Admit Date: 8/20/2021       Subjective:  Chief Complaint   Patient presents with    Shortness of Breath     Sob for the past few days, today sob increased , pcp placed on antibiotics for lung infection, left upper lobe removed due to cancer            Objective:      Had bipap through the night now on 6L NC     rocephin and doxy discontinued  Duoneb, brovana, IV steroids    Sitting in chair  Talking feeling much better    Scheduled Meds:  Current Facility-Administered Medications   Medication Dose Route Frequency Provider Last Rate Last Admin    dorzolamide (TRUSOPT) 2 % ophthalmic solution 1 drop  1 drop Both Eyes BID Zhane Melgar MD        latanoprost (XALATAN) 0.005 % ophthalmic solution 1 drop  1 drop Both Eyes Daily Zhane Melgar MD        benzonatate (TESSALON) capsule 100 mg  100 mg Oral TID PRN Zhane Melgar MD        guaiFENesin-dextromethorphan (ROBITUSSIN DM) 100-10 MG/5ML syrup 5 mL  5 mL Oral Q4H PRN Zhane Melgar MD   5 mL at 08/21/21 1048    guaiFENesin tablet 400 mg  400 mg Oral TID Gokul Shireen Bloom, DO   400 mg at 08/21/21 2113    ipratropium-albuterol (DUONEB) nebulizer solution 3 ampule  3 ampule Inhalation Q4H JAMIE Acosta, DO   1 ampule at 08/22/21 0852    iopamidol (ISOVUE-370) 76 % injection 75 mL  75 mL Intravenous ONCE PRN Charis Lennox, DO        levothyroxine (SYNTHROID) tablet 137 mcg  137 mcg Oral Daily Zhane Melgar MD   137 mcg at 08/21/21 0907    pantoprazole (PROTONIX) tablet 40 mg  40 mg Oral QAM AC Zhane Melgar MD   40 mg at 08/22/21 0506    atorvastatin (LIPITOR) tablet 20 mg  20 mg Oral Daily Zhane Melgar MD   20 mg at 08/21/21 2011    sodium chloride flush 0.9 % injection 5-40 mL  5-40 mL Intravenous 2 times per day Zhane Melgar MD   10 mL at 08/21/21 2123    sodium chloride flush 0.9 % injection 5-40 mL  5-40 mL Intravenous PRN Zhane Melgar MD        0.9 % sodium chloride infusion  25 mL Intravenous PRN Zhane Melgar MD        ondansetron (ZOFRAN-ODT) disintegrating tablet 4 mg  4 mg Oral Q8H PRN Bernadine Reyes MD        Or    [facility] injection 4 mg  4 mg Intravenous Q6H PRN Bernadine Reyes MD        polyethylene glycol (GLYCOLAX) packet 17 g  17 g Oral Daily PRN Bernadine Reyes MD        enoxaparin (LOVENOX) injection 40 mg  40 mg Subcutaneous Daily Bernadine Reyes MD   40 mg at 08/21/21 3465    acetaminophen (TYLENOL) tablet 650 mg  650 mg Oral Q6H PRN Bernadine Reyes MD        Or    acetaminophen (TYLENOL) suppository 650 mg  650 mg Rectal Q6H PRN Bernadine Reyes MD        budesonide (PULMICORT) nebulizer suspension 500 mcg  0.5 mg Nebulization BID Bernadine Reyes MD   500 mcg at 08/22/21 4134    Arformoterol Tartrate (BROVANA) nebulizer solution 15 mcg  15 mcg Nebulization BID LIDIA Osman CNP   15 mcg at 08/22/21 7143    methylPREDNISolone sodium (SOLU-MEDROL) injection 40 mg  40 mg Intravenous Q12H Gagan Valdez MD   40 mg at 08/22/21 0440    perflutren lipid microspheres (DEFINITY) injection 1.65 mg  1.5 mL Intravenous ONCE PRN LIDIA Osman CNP        ALPRAZolam Shanda Sunol) tablet 0.5 mg  0.5 mg Oral BID PRN Bernadine Reyes MD        amLODIPine (NORVASC) tablet 5 mg  5 mg Oral Daily Bernadine Reyes MD   5 mg at 08/21/21 0907       Continuous Infusions  :   sodium chloride         PRN Meds:  benzonatate, guaiFENesin-dextromethorphan, iopamidol, sodium chloride flush, sodium chloride, ondansetron **OR** ondansetron, polyethylene glycol, acetaminophen **OR** acetaminophen, perflutren lipid microspheres, ALPRAZolam      Wt Readings from Last 3 Encounters:   08/22/21 173 lb (78.5 kg)   03/31/21 180 lb (81.6 kg)   09/29/20 179 lb (81.2 kg)     I/O last 3 completed shifts:   In: 180 [P.O.:180]  Out: -     Intake/Output Summary (Last 24 hours) at 8/22/2021 0925  Last data filed at 8/22/2021 0829  Gross per 24 hour   Intake 180 ml   Output 1200 ml   Net -1020 ml Vitals:    08/22/21 0836   BP: (!) 147/69   Pulse: 89   Resp: 20   Temp:    SpO2: 99%       Physical Exam:    Skin:    Warm and dry. No rash or bruises    Neck:    No JVD, No thyromegaly, No carotid bruit  Cardiac:   RRR, No gallop or murmur  Lungs:  Lungs with debra rhonchi, decreased breath sounds  Abdomen:  Normal bowel sounds, abd soft, non-tender, no rebound or guarding  Extremities:   No clubbing, edema or cyanosis  Neurological:   A & O x 3, Moves all extremities, normal DTR                CBC  Recent Labs     08/20/21  0628 08/21/21  0810   WBC 9.3 9.1   HGB 15.1 15.0   HCT 46.7 45.2   MCV 92.5 90.8    235     BMP  Recent Labs     08/20/21  0628      K 4.5      CO2 25   BUN 10   CREATININE 0.6   LABGLOM >60   CALCIUM 9.8     LFT's  No results for input(s): ALT in the last 72 hours. Invalid input(s):  AST,  ALKPHOS,  BILITOT,  BILIDIR  INR: No results for input(s): INR in the last 72 hours. Lab Results   Component Value Date    CRP 0.8 (H) 08/20/2021     Lab Results   Component Value Date    SEDRATE 13 08/20/2021     No results for input(s): POCGLU in the last 72 hours. Lipids: No results for input(s): CHOL, HDL in the last 72 hours.     Invalid input(s): LDLCALCU  ABGs: No results found for: PHART, PO2ART, AAB7ANS  SpO2 Readings from Last 1 Encounters:   08/22/21 99%       Last 3 Troponin:    Lab Results   Component Value Date    TROPONINI <0.01 11/14/2015    TROPONINI <0.01 11/14/2015    TROPONINI <0.01 11/13/2015     Lab Results   Component Value Date    CKTOTAL 44 11/13/2015    CKMB 2.6 11/13/2015    TROPONINI <0.01 11/14/2015    TROPONINI <0.01 11/14/2015    TROPONINI <0.01 11/13/2015        TSH:    Lab Results   Component Value Date    TSH 0.222 03/15/2021      Vent Information  Skin Assessment: Clean, dry, & intact  FiO2 : 50 %  SpO2: 99 %  SpO2/FiO2 ratio: 198  I Time/ I Time %: 0.9 s  Mask Type: Full face mask  Mask Size: Medium      U/A:     Lab Results   Component Value Date    COLORU Straw 11/13/2015    PHUR 6.0 11/13/2015    WBCUA 0-1 11/13/2015    RBCUA 0-1 11/13/2015    BACTERIA RARE 11/13/2015    CLARITYU Clear 11/13/2015    SPECGRAV 1.020 11/13/2015    LEUKOCYTESUR SMALL 11/13/2015    UROBILINOGEN 0.2 11/13/2015    BILIRUBINUR Negative 11/13/2015    BLOODU SMALL 11/13/2015    GLUCOSEU Negative 11/13/2015          Iron studies:No results found for: FERRITIN  Bone disease:No results found for: PTH, MG, PHOS  Nutrition:No results found for: ALB           Assessment:  Patient Active Problem List   Diagnosis Code    History of colon polyps Z86.010    Family history of colon cancer Z80.0    Personal history of breast cancer Z85.3    Hypothyroidism E03.9    Glaucoma H40.9    Hyperlipidemia E78.5    Disease of lung J98.4    Nodule of left lung R91.1    [facility] C34.92    Raynaud's phenomenon without gangrene I73.00    Venous insufficiency of both lower extremities I87.2    Acute exacerbation of chronic obstructive pulmonary disease (COPD) ([facility] J44.1          1. Acute hypercapneic respiratory failure     2. . exacerbation COPD               PFT June 2019 with mild obstruction no significant bronchodilator response     3. Hypothyroid     4.  Glaucoma     5. Hyperlipidemia     6.  Elevated bp  Star amlodipine           Plan:   Iv steroids  Wean O2   Cont present pulm care    Basil Trejo MD M.D.    8/22/2021

## 2021-08-23 ENCOUNTER — TELEPHONE (OUTPATIENT)
Dept: FAMILY MEDICINE CLINIC | Age: 81
End: 2021-08-23

## 2021-08-23 PROCEDURE — 6370000000 HC RX 637 (ALT 250 FOR IP): Performed by: FAMILY MEDICINE

## 2021-08-23 PROCEDURE — 2060000000 HC ICU INTERMEDIATE R&B

## 2021-08-23 PROCEDURE — 99231 SBSQ HOSP IP/OBS SF/LOW 25: CPT | Performed by: FAMILY MEDICINE

## 2021-08-23 PROCEDURE — 2700000000 HC OXYGEN THERAPY PER DAY

## 2021-08-23 PROCEDURE — 6360000002 HC RX W HCPCS: Performed by: NURSE PRACTITIONER

## 2021-08-23 PROCEDURE — 6360000002 HC RX W HCPCS: Performed by: FAMILY MEDICINE

## 2021-08-23 PROCEDURE — 6370000000 HC RX 637 (ALT 250 FOR IP): Performed by: INTERNAL MEDICINE

## 2021-08-23 PROCEDURE — 97530 THERAPEUTIC ACTIVITIES: CPT

## 2021-08-23 PROCEDURE — 6370000000 HC RX 637 (ALT 250 FOR IP): Performed by: NURSE PRACTITIONER

## 2021-08-23 PROCEDURE — 94640 AIRWAY INHALATION TREATMENT: CPT

## 2021-08-23 PROCEDURE — 6370000000 HC RX 637 (ALT 250 FOR IP): Performed by: STUDENT IN AN ORGANIZED HEALTH CARE EDUCATION/TRAINING PROGRAM

## 2021-08-23 PROCEDURE — 6360000002 HC RX W HCPCS: Performed by: INTERNAL MEDICINE

## 2021-08-23 PROCEDURE — 97165 OT EVAL LOW COMPLEX 30 MIN: CPT

## 2021-08-23 PROCEDURE — 94660 CPAP INITIATION&MGMT: CPT

## 2021-08-23 PROCEDURE — 2580000003 HC RX 258: Performed by: FAMILY MEDICINE

## 2021-08-23 RX ORDER — PREDNISONE 20 MG/1
20 TABLET ORAL DAILY
Qty: 5 TABLET | Refills: 0 | Status: SHIPPED | OUTPATIENT
Start: 2021-08-24 | End: 2021-08-29

## 2021-08-23 RX ORDER — GUAIFENESIN 400 MG/1
400 TABLET ORAL 3 TIMES DAILY
Qty: 56 TABLET | Refills: 0 | Status: SHIPPED | OUTPATIENT
Start: 2021-08-23 | End: 2021-09-20 | Stop reason: ALTCHOICE

## 2021-08-23 RX ORDER — BENZONATATE 100 MG/1
100 CAPSULE ORAL 3 TIMES DAILY
Qty: 21 CAPSULE | Refills: 0 | Status: SHIPPED | OUTPATIENT
Start: 2021-08-23 | End: 2021-08-30

## 2021-08-23 RX ORDER — PREDNISONE 20 MG/1
20 TABLET ORAL DAILY
Status: DISCONTINUED | OUTPATIENT
Start: 2021-08-23 | End: 2021-08-23

## 2021-08-23 RX ORDER — PREDNISONE 20 MG/1
20 TABLET ORAL DAILY
Status: DISCONTINUED | OUTPATIENT
Start: 2021-08-24 | End: 2021-08-25 | Stop reason: HOSPADM

## 2021-08-23 RX ORDER — DOXYCYCLINE HYCLATE 100 MG/1
100 CAPSULE ORAL EVERY 12 HOURS SCHEDULED
Status: COMPLETED | OUTPATIENT
Start: 2021-08-23 | End: 2021-08-25

## 2021-08-23 RX ADMIN — BUDESONIDE 500 MCG: 0.5 SUSPENSION RESPIRATORY (INHALATION) at 08:25

## 2021-08-23 RX ADMIN — BENZONATATE 100 MG: 100 CAPSULE ORAL at 21:19

## 2021-08-23 RX ADMIN — Medication 10 ML: at 21:22

## 2021-08-23 RX ADMIN — ARFORMOTEROL TARTRATE 15 MCG: 15 SOLUTION RESPIRATORY (INHALATION) at 20:49

## 2021-08-23 RX ADMIN — BUDESONIDE 500 MCG: 0.5 SUSPENSION RESPIRATORY (INHALATION) at 20:49

## 2021-08-23 RX ADMIN — GUAIFENESIN 400 MG: 400 TABLET ORAL at 10:26

## 2021-08-23 RX ADMIN — PANTOPRAZOLE SODIUM 40 MG: 40 TABLET, DELAYED RELEASE ORAL at 06:04

## 2021-08-23 RX ADMIN — IPRATROPIUM BROMIDE AND ALBUTEROL SULFATE 1 AMPULE: .5; 3 SOLUTION RESPIRATORY (INHALATION) at 12:16

## 2021-08-23 RX ADMIN — LEVOTHYROXINE SODIUM 137 MCG: 25 TABLET ORAL at 10:25

## 2021-08-23 RX ADMIN — GUAIFENESIN 400 MG: 400 TABLET ORAL at 16:00

## 2021-08-23 RX ADMIN — ACETAMINOPHEN 650 MG: 325 TABLET ORAL at 17:30

## 2021-08-23 RX ADMIN — ARFORMOTEROL TARTRATE 15 MCG: 15 SOLUTION RESPIRATORY (INHALATION) at 08:25

## 2021-08-23 RX ADMIN — METHYLPREDNISOLONE SODIUM SUCCINATE 40 MG: 40 INJECTION, POWDER, LYOPHILIZED, FOR SOLUTION INTRAMUSCULAR; INTRAVENOUS at 17:31

## 2021-08-23 RX ADMIN — IPRATROPIUM BROMIDE AND ALBUTEROL SULFATE 1 AMPULE: .5; 3 SOLUTION RESPIRATORY (INHALATION) at 20:48

## 2021-08-23 RX ADMIN — ATORVASTATIN CALCIUM 20 MG: 20 TABLET, FILM COATED ORAL at 10:26

## 2021-08-23 RX ADMIN — IPRATROPIUM BROMIDE AND ALBUTEROL SULFATE 1 AMPULE: .5; 3 SOLUTION RESPIRATORY (INHALATION) at 17:07

## 2021-08-23 RX ADMIN — METHYLPREDNISOLONE SODIUM SUCCINATE 40 MG: 40 INJECTION, POWDER, LYOPHILIZED, FOR SOLUTION INTRAMUSCULAR; INTRAVENOUS at 04:26

## 2021-08-23 RX ADMIN — ALPRAZOLAM 0.5 MG: 0.25 TABLET ORAL at 23:18

## 2021-08-23 RX ADMIN — DOXYCYCLINE HYCLATE 100 MG: 100 CAPSULE ORAL at 21:21

## 2021-08-23 RX ADMIN — BENZONATATE 100 MG: 100 CAPSULE ORAL at 16:00

## 2021-08-23 RX ADMIN — DORZOLAMIDE HCL 1 DROP: 20 SOLUTION/ DROPS OPHTHALMIC at 21:19

## 2021-08-23 RX ADMIN — ENOXAPARIN SODIUM 40 MG: 40 INJECTION SUBCUTANEOUS at 10:26

## 2021-08-23 RX ADMIN — GUAIFENESIN 400 MG: 400 TABLET ORAL at 21:21

## 2021-08-23 RX ADMIN — BENZONATATE 100 MG: 100 CAPSULE ORAL at 10:26

## 2021-08-23 RX ADMIN — IPRATROPIUM BROMIDE AND ALBUTEROL SULFATE 1 AMPULE: .5; 3 SOLUTION RESPIRATORY (INHALATION) at 08:24

## 2021-08-23 RX ADMIN — AMLODIPINE BESYLATE 5 MG: 5 TABLET ORAL at 10:26

## 2021-08-23 ASSESSMENT — PAIN SCALES - GENERAL: PAINLEVEL_OUTOF10: 3

## 2021-08-23 NOTE — PROGRESS NOTES
Eduar Smith M.D.,[facility]  Caimlle Lima D.O., F.A.C.O.I., Gael Salazar M.D. Candy Blair M.D. Saul Dyer D.O. Daily Pulmonary Progress Note    Patient:  Susana Madsen 78 y.o. female MRN: [id number]     Date of Service: 8/23/2021      Synopsis     We are following patient for COPD exacerbation    \"CC\"  Shortness of breath     Code status: DNR CC      Subjective      Patient was seen and examined. Up to chair. Feeling better with breathing. No complaints. Occasional cough, yellow tinge mucus expectorated. No fevers. Remains on oxygen 2 liters NC. Will order home nebulizer. Needs ambulatory Ox testing prior to dc. Review of Systems:  Constitutional: Denies fever, weight loss, night sweats, and fatigue  Skin: Denies pigmentation, dark lesions, and rashes   HEENT: Denies hearing loss, tinnitus, ear drainage, epistaxis, sore throat, and hoarseness. Cardiovascular: Denies palpitations, chest pain, and chest pressure. Respiratory: Denies dyspnea at rest, hemoptysis, apnea, and choking.   Gastrointestinal: Denies nausea, vomiting, poor appetite, diarrhea, heartburn or reflux  Genitourinary: Denies dysuria, frequency, urgency or hematuria  Musculoskeletal: Denies myalgias, muscle weakness, and bone pain  Neurological: Denies dizziness, vertigo, headache, and focal weakness  Psychological: Denies anxiety and depression  Endocrine: Denies heat intolerance and cold intolerance  Hematopoietic/Lymphatic: Denies bleeding problems and blood transfusions    24-hour events:  None     Objective   Vitals: BP (!) 147/70   Pulse 88   Temp 97.4 °F (36.3 °C) (Oral)   Resp 20   Ht 5' 3.5\" (1.613 m)   Wt 173 lb (78.5 kg)   SpO2 95%   BMI 30.16 kg/m²     I/O:    Intake/Output Summary (Last 24 hours) at 8/23/2021 1234  Last data filed at 8/23/2021 0800  Gross per 24 hour   Intake 120 ml   Output 900 ml   Net -780 ml       Vent Information  Skin Assessment: Clean, dry, & intact  FiO2 : 50 %  SpO2: 95 %  SpO2/FiO2 ratio: 198  I Time/ I Time %: 0.9 s  Mask Type: Full face mask  Mask Size: Medium       IPAP: 12 cmH20  CPAP/EPAP: 6 cmH2O     CURRENT MEDS :  Scheduled Meds:   dorzolamide  1 drop Both Eyes BID    latanoprost  1 drop Both Eyes Daily    benzonatate  100 mg Oral TID    guaiFENesin  400 mg Oral TID    ipratropium-albuterol  3 ampule Inhalation Q4H WA    levothyroxine  137 mcg Oral Daily    pantoprazole  40 mg Oral QAM AC    atorvastatin  20 mg Oral Daily    sodium chloride flush  5-40 mL Intravenous 2 times per day    enoxaparin  40 mg Subcutaneous Daily    budesonide  0.5 mg Nebulization BID    Arformoterol Tartrate  15 mcg Nebulization BID    methylPREDNISolone  40 mg Intravenous Q12H    amLODIPine  5 mg Oral Daily       Physical Exam:  General Appearance: appears comfortable in no acute distress. HEENT: Normocephalic atraumatic without obvious abnormality   Neck: Lips, mucosa, and tongue normal.  Supple, symmetrical, trachea midline, no adenopathy;thyroid:  no enlargement/tenderness/nodules or JVD. Lung: Breath sounds CTA. Respirations   unlabored. Symmetrical expansion. Heart: RRR, normal S1, S2. No MRG  Abdomen: Soft, NT, ND. BS present x 4 quadrants. No bruit or organomegaly. Extremities: Pedal pulses 2+ symmetric b/l. Extremities normal, no cyanosis, clubbing, or edema. Musculokeletal: No joint swelling, no muscle tenderness. ROM normal in all joints of extremities. Neurologic: Mental status: Alert and Oriented X3 . Pertinent/ New Labs and Imaging Studies     Imaging Personally Reviewed:  CXR 8/22  Hazy opacity is seen in the left basilar region.  The right lung is grossly   clear.  The cardiac silhouette is magnified. No pneumothorax or pleural   effusion is seen. ECHO 8/22/21   Tachycardia noted. Normal left ventricular size. LV systolic function is hyperdynamic. Ejection fraction is visually estimated at 75-80%.    There is near obliteration of the LV cavity during systole. There appears to be an intracavitary gradient approximately 20 mmHg. Indeterminate diastolic function. No regional wall motion abnormalities seen. Normal left ventricular wall thickness. Normal right ventricular size and function. No significant valvular abnormalities. Signature      ----------------------------------------------------------------   Electronically signed by Nia Cruz MD(Interpreting   physician) on 08/22/2021 12:47 PM    Labs:  Lab Results   Component Value Date    WBC 9.1 08/21/2021    HGB 15.0 08/21/2021    HCT 45.2 08/21/2021    MCV 90.8 08/21/2021    MCH 30.1 08/21/2021    MCHC 33.2 08/21/2021    RDW 12.6 08/21/2021     08/21/2021    MPV 11.6 08/21/2021     Lab Results   Component Value Date     08/20/2021    K 4.5 08/20/2021     08/20/2021    CO2 25 08/20/2021    BUN 10 08/20/2021    CREATININE 0.6 08/20/2021    LABALBU 4.0 03/15/2021    CALCIUM 9.8 08/20/2021    GFRAA >60 08/20/2021    LABGLOM >60 08/20/2021     Lab Results   Component Value Date    PROTIME 11.4 08/30/2016    INR 1.1 08/30/2016     No results for input(s): PROBNP in the last 72 hours. No results for input(s): PROCAL in the last 72 hours. This SmartLink has not been configured with any valid records. Micro:  No results for input(s): CULTRESP in the last 72 hours. No results for input(s): LABGRAM in the last 72 hours. No results for input(s): LEGUR in the last 72 hours. No results for input(s): STREPNEUMAGU in the last 72 hours. No results for input(s): LP1UAG in the last 72 hours. Assessment:    1. Acute respiratory failure with hypoxia and hypercapnia  2. COPD with acute exacerbation. Prior PFTs 2019 GOLD stage I mild   3. Hx of Stage Ib adenocarcinoma left lung with VATS left upper lobectomy September 19, 2017 at [facility]  4. History of breast cancer 2012 treated with resection and radiation to the right breast  5. Allergic rhinitis  6.  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 GERD  8. Thyroid disease      Plan:   · Wean oxygen as tolerated to keep SpO2 >88%, on 2LNC  · Check ambulatory pulse ox prior to D/C  · Continue NIV with BiPAP 12/6 PRN not using. No need for NIV at dc. abgs improved  · Continue doxy x 2 more days then stop. · Continue Solu-Medrol 40 mg every 12H x1 more dose, Prednisone taper written  · Aerosol bronchodilators with DuoNebs every 4 while awake, Pulmicort/Brovana BID  · Home nebulizer for dc  · GI/DVT-Lovenox 40 mg, Protonix 40 mg daily  · Message routed for follow up    This plan of care was reviewed in collaboration with Dr. Kamala Pryor  Electronically signed by LIDIA Rothman CNP on 8/23/2021 at 12:34 PM    I personally saw, examined, and cared for the patient. Labs, medications, radiographs reviewed. I agree with history exam and plans detailed in NP note.   Mirlande Peck MD

## 2021-08-23 NOTE — TELEPHONE ENCOUNTER
Luci Wyatt from [address] [place] [address] called, Ga Herrera will be being discharged soon. Will you sign her home health orders?

## 2021-08-23 NOTE — PROGRESS NOTES
Admit Date: 8/20/2021       Subjective:  Chief Complaint   Patient presents with    Shortness of Breath     Sob for the past few days, today sob increased , pcp placed on antibiotics for lung infection, left upper lobe removed due to cancer            Objective:      No bipaplast pm and on room air now    Duoneb, brovana, IV steroids    Planning to reab when precert done    Still very weak on ambulation  Daughter getting home ready for her to move in with them      Scheduled Meds:  Current Facility-Administered Medications   Medication Dose Route Frequency Provider Last Rate Last Admin    dorzolamide (TRUSOPT) 2 % ophthalmic solution 1 drop  1 drop Both Eyes BID Basil Trejo MD   1 drop at 08/22/21 2124    latanoprost (XALATAN) 0.005 % ophthalmic solution 1 drop  1 drop Both Eyes Daily Basil Trejo MD   1 drop at 08/22/21 0957    benzonatate (TESSALON) capsule 100 mg  100 mg Oral TID Keegan Jung Tamikoon, DO   100 mg at 08/22/21 2112    guaiFENesin-dextromethorphan (ROBITUSSIN DM) 100-10 MG/5ML syrup 5 mL  5 mL Oral Q4H PRN Basil Trejo MD   5 mL at 08/21/21 1048    guaiFENesin tablet 400 mg  400 mg Oral TID Keegan Fabiana Morrisonon, DO   400 mg at 08/22/21 2112    ipratropium-albuterol (DUONEB) nebulizer solution 3 ampule  3 ampule Inhalation Q4H JAMIE Acosta, DO   1 ampule at 08/22/21 2003    iopamidol (ISOVUE-370) 76 % injection 75 mL  75 mL Intravenous ONCE PRN Abhijit Large, DO        levothyroxine (SYNTHROID) tablet 137 mcg  137 mcg Oral Daily Basil Trejo MD   137 mcg at 08/22/21 6981    pantoprazole (PROTONIX) tablet 40 mg  40 mg Oral QAM AC Basil Trejo MD   40 mg at 08/22/21 0506    atorvastatin (LIPITOR) tablet 20 mg  20 mg Oral Daily Basil Trejo MD   20 mg at 08/22/21 9000    sodium chloride flush 0.9 % injection 5-40 mL  5-40 mL Intravenous 2 times per day Basil Trejo MD   10 mL at 08/22/21 2100    sodium chloride flush 0.9 % injection 5-40 mL  5-40 mL Intravenous PRN Abhinav Smith MD        0.9 % sodium chloride infusion  25 mL Intravenous PRN Abhinav Smith MD        ondansetron (ZOFRAN-ODT) disintegrating tablet 4 mg  4 mg Oral Q8H PRN Abhinav Smith MD        Or    ondansetron [facility]) injection 4 mg  4 mg Intravenous Q6H PRN Abhinav Smith MD        polyethylene glycol (GLYCOLAX) packet 17 g  17 g Oral Daily PRN Abhinav Smith MD        enoxaparin (LOVENOX) injection 40 mg  40 mg Subcutaneous Daily Abhinav Smith MD   40 mg at 08/22/21 7972    acetaminophen (TYLENOL) tablet 650 mg  650 mg Oral Q6H PRN Abhinav Smith MD        Or    acetaminophen (TYLENOL) suppository 650 mg  650 mg Rectal Q6H PRN Abhinav Smith MD        budesonide (PULMICORT) nebulizer suspension 500 mcg  0.5 mg Nebulization BID Abhinav Smith MD   500 mcg at 08/22/21 2003    Arformoterol Tartrate (BROVANA) nebulizer solution 15 mcg  15 mcg Nebulization BID LIDIA Jeffers CNP   15 mcg at 08/22/21 2003    methylPREDNISolone sodium (SOLU-MEDROL) injection 40 mg  40 mg Intravenous Q12H Pearl Villatoro MD   40 mg at 08/23/21 0426    perflutren lipid microspheres (DEFINITY) injection 1.65 mg  1.5 mL Intravenous ONCE PRN LIDIA Jeffers CNP        ALPRAZolam Cathmagdalena Kolb) tablet 0.5 mg  0.5 mg Oral BID PRN Abhinav Smith MD        amLODIPine (NORVASC) tablet 5 mg  5 mg Oral Daily Abhinav Smith MD   5 mg at 08/22/21 0938       Continuous Infusions  :   sodium chloride         PRN Meds:  guaiFENesin-dextromethorphan, iopamidol, sodium chloride flush, sodium chloride, ondansetron **OR** ondansetron, polyethylene glycol, acetaminophen **OR** acetaminophen, perflutren lipid microspheres, ALPRAZolam      Wt Readings from Last 3 Encounters:   08/22/21 173 lb (78.5 kg)   03/31/21 180 lb (81.6 kg)   09/29/20 179 lb (81.2 kg)     I/O last 3 completed shifts:  In: -   Out: 1200 [Urine:1200]    Intake/Output Summary (Last 24 hours) at 8/23/2021 6673  Last data filed at 8/22/2021 0829  Gross per 24 hour   Intake --   Output 1200 ml   Net -1200 ml       Vitals:    08/23/21 0018   BP: (!) 141/79   Pulse: 90   Resp:    Temp: 97.3 °F (36.3 °C)   SpO2: 97%       Physical Exam:    Skin:    Warm and dry. No rash or bruises    Neck:    No JVD, No thyromegaly, No carotid bruit  Cardiac:   RRR, No gallop or murmur  Lungs:  Lungs with debra rhonchi, decreased breath sounds  Abdomen:  Normal bowel sounds, abd soft, non-tender, no rebound or guarding  Extremities:   No clubbing, edema or cyanosis  Neurological:   A & O x 3, Moves all extremities, normal DTR                CBC  Recent Labs     08/20/21  0628 08/21/21  0810   WBC 9.3 9.1   HGB 15.1 15.0   HCT 46.7 45.2   MCV 92.5 90.8    235     BMP  Recent Labs     08/20/21  0628      K 4.5      CO2 25   BUN 10   CREATININE 0.6   LABGLOM >60   CALCIUM 9.8     LFT's  No results for input(s): ALT in the last 72 hours. Invalid input(s):  AST,  ALKPHOS,  BILITOT,  BILIDIR  INR: No results for input(s): INR in the last 72 hours. Lab Results   Component Value Date    CRP 0.8 (H) 08/20/2021     Lab Results   Component Value Date    SEDRATE 13 08/20/2021     No results for input(s): POCGLU in the last 72 hours. Lipids: No results for input(s): CHOL, HDL in the last 72 hours.     Invalid input(s): LDLCALCU  ABGs: No results found for: PHART, PO2ART, SFI7DDO  SpO2 Readings from Last 1 Encounters:   08/23/21 97%       Last 3 Troponin:    Lab Results   Component Value Date    TROPONINI <0.01 11/14/2015    TROPONINI <0.01 11/14/2015    TROPONINI <0.01 11/13/2015     Lab Results   Component Value Date    CKTOTAL 44 11/13/2015    CKMB 2.6 11/13/2015    TROPONINI <0.01 11/14/2015    TROPONINI <0.01 11/14/2015    TROPONINI <0.01 11/13/2015        TSH:    Lab Results   Component Value Date    TSH 0.222 03/15/2021      Vent Information  Skin Assessment: Clean, dry, & intact  FiO2 : 50 %  SpO2: 97 %  SpO2/FiO2 ratio: 198  I Time/ I Time %: 0.9 s  Mask Type: Full face mask  Mask Size: Medium      U/A:     Lab Results   Component Value Date    COLORU Straw 11/13/2015    PHUR 6.0 11/13/2015    WBCUA 0-1 11/13/2015    RBCUA 0-1 11/13/2015    BACTERIA RARE 11/13/2015    CLARITYU Clear 11/13/2015    SPECGRAV 1.020 11/13/2015    LEUKOCYTESUR SMALL 11/13/2015    UROBILINOGEN 0.2 11/13/2015    BILIRUBINUR Negative 11/13/2015    BLOODU SMALL 11/13/2015    GLUCOSEU Negative 11/13/2015          Iron studies:No results found for: FERRITIN  Bone disease:No results found for: PTH, MG, PHOS  Nutrition:No results found for: ALB           Assessment:  Patient Active Problem List   Diagnosis Code    History of colon polyps Z86.010    Family history of colon cancer Z80.0    Personal history of breast cancer Z85.3    Hypothyroidism E03.9    Glaucoma H40.9    Hyperlipidemia E78.5    Disease of lung J98.4    Nodule of left lung R91.1    Adenocarcinoma, lung, left ([facility] [address] C34.92    Raynaud's phenomenon without gangrene I73.00    Venous insufficiency of both lower extremities I87.2    Acute exacerbation of chronic obstructive pulmonary disease (COPD) ([facility] [address] J44.1          1. Acute hypercapneic respiratory failure     2. . exacerbation COPD               PFT June 2019 with mild obstruction no significant bronchodilator response     3. Hypothyroid     4.  Glaucoma     5. Hyperlipidemia     6.  Elevated bp  Star amlodipine           Plan:   Iv steroids  For KEITH  Cont present pulm care    Naya Cantu MD M.D.    8/23/2021

## 2021-08-23 NOTE — PROGRESS NOTES
Occupational Therapy  OCCUPATIONAL THERAPY INITIAL EVALUATION  [facility]  [address], [place]    Date: 2021     Patient Name: Darnell Lee  MRN: [id number]  : 1940  Room: [address] [place] [address]    Evaluating OT: Shorty Diaz, OTR/L - [id number]    Referring Provider: Aniya Wynn DO  Specific Provider Orders/Date: \"OT eval and treat\" - 2021    Diagnosis: Acute exacerbation of chronic obstructive pulmonary disease (COPD) ([facility] [address] [J44.1], COPD exacerbation ([facility] [address] [J44.1]     Pertinent Medical History: COPD, breast cancer, glaucoma, lung cancer, OA     Precautions: fall risk, 3L O2 via nasal cannula    Assessment of Current Deficits:    [x] Functional mobility   [x]ADLs  [x] Strength               [x]Cognition   [x] Functional transfers   [x] IADLs         [x] Safety Awareness   [x]Endurance   [] Fine Coordination              [x] Balance      [] Vision/perception   [x]Sensation    []Gross Motor Coordination  [] ROM  [] Delirium                   [] Motor Control     OT PLAN OF CARE   OT POC is based on physician orders, patient diagnosis, and results of clinical assessment.   Frequency/Duration 2-5 days/week for 2 weeks PRN   Specific OT Treatment Interventions to Include:   * Instruction/training on adapted ADL techniques and AE recommendations to increase functional independence within precautions       * Training on energy conservation strategies, correct breathing pattern and techniques to improve independence/tolerance for self-care routine  * Functional transfer/mobility training/DME recommendations for increased independence, safety, and fall prevention  * Patient/Family education to increase follow through with safety techniques and functional independence  * Recommendation of environmental modifications for increased safety with functional transfers/mobility and ADLs  * Therapeutic exercise to improve motor endurance, ROM, and needed/appropriate   Bathing Mod A  SBA - with use of AE/DME, as needed/appropriate   Toileting Mod A  SBA   Bed Mobility  Not assessed. Patient seated in bedside chair upon start and conclusion of this session. Functional Transfers Sit-to-Stand: Min A   from bedside chair. Cues given to maximize safety. Supervision   Functional Mobility Min A   (with walker) for short distance within patient's room. Assistance given with management of O2 line during functional mobility. Mod I with functional mobility (with device, as needed/appropriate) in order to maximize independence with ADLs/IADLs and other functional tasks. Balance Sitting: Good  (at edge of chair)  Standing: Fair-  (with walker)  Fair+ dynamic standing balance during completion of ADLs/IADLs and other functional tasks. Activity Tolerance Limited  Mild shortness of breath noted with LB dressing task (e.g. adjusting socks while seated in bedside chair) and minimal functional mobility. Patient will demonstrate Good understanding and consistent implementation of energy conservation techniques and work simplification techniques into ADL/IADL routines. Visual/  Perceptual WFL grossly  Patient reported h/o glaucoma. N/A   B UE Strength 3+/5  Patient will demonstrate 4/5 B UE strength in order to maximize independence with ADLs/IADLs and functional transfers. Additional Long-Term Goal: Patient will increase functional independence to PLOF in order to allow patient to live in least restrictive environment. ROM: Additional Information:    R UE  WFL    L UE WFL      Hearing: WFL grossly  Sensation: No complaints of numbness/tingling in B UEs. Tone: WFL  Edema: No    Comments: RN approved patient's participation in [address] [place] [address] activities. Upon arrival, patient seated in bedside chair. At end of session, patient seated in bedside chair with call light and phone within reach and all lines and tubes intact.  Patient would benefit from continued skilled OT to increase safety and independence with completion of ADL/IADL tasks for functional independence and quality of life. Treatment: OT treatment provided this date included:    Instruction/training on safety and adapted techniques for completion of ADLs.   Instruction/training on safe functional mobility/transfer techniques.   Instruction/training on energy conservation/work simplification for completion of ADLs. Patient education provided regardin) potential benefits of continued therapy services upon discharge, 2) techniques to maximize safety with management of walker. Patient indicated understanding. Further skilled OT treatment indicated to increase patient's safety and independence with completion of ADL/IADL tasks in order to maximize patient's functional independence and quality of life. Rehab Potential: Good for established goals. Patient / Family Goal: Patient stated that she plans to stay with her daughter upon discharge. Patient and/or family were instructed on functional diagnosis, prognosis/goals, and OT plan of care. Demonstrated Good understanding. Eval Complexity: Low    Time In: 1100  Time Out: 1130  Total Treatment Time: 10 minutes      Minutes Units   OT Eval Low 81697 20 1   OT Eval Medium 60500     OT Eval High 90374     OT Re-Eval [id number]     Therapeutic Ex 18790     Therapeutic Activities 73333 10 1   ADL/Self Care 12972     Orthotic Management 04429     Neuro Re-Ed 51299     Non-Billable Time N/A ---     Evaluation time includes thorough review of current medical information, gathering information on past medical history/social history and prior level of function, completion of standardized testing/informal observation of tasks, assessment of data, and education on plan of care and goals. BECKY Felix/JASON  License Number: [id number]

## 2021-08-23 NOTE — CARE COORDINATION
Social Work/Discharge Planning:  Met with patient and discussed discharge planning. COVID negative 8/20. Patient lives alone in a one story house. She has a nebulizer and wheeled walker. She is currently requiring three liters of oxygen. Discussed therapy score indicating need for snf. Patient states plan is either to a snf or to her daughter Devi woodward at discharge. She requested for this worker to contact her daughter to confirm discharge plan. Called patient daughter Joshua Albarran (ph: [phone]) and provided update. Joshua Albarran states she will discuss today with her mother, if plan is to a snf or to [facility]. Joshuachayito Albarran states she lives in a two story house but her mother will stay on the first floor. Joshuachayito Albarran to have an estimate tomorrow for an [place] stairlift. Informed Joshua Albarran that this worker will leave snf and hhc choice list in her mother's room for review. Will continue to follow. Electronically signed by STACI Andino on 8/23/2021 at 11:30 AM    Addendum:  Patient and her daughter Joshua Albarran states plan is home to [facility] at discharge. Joshuachayito Albarran states her firs [facility] choice is [address] and [address] [employer]. Referral made to Socorro Cleary [address] with [address] and she will  Review patient information. Patient will need a home health care order. Will continue to follow.   Electronically signed by STACI Andino on 8/23/2021 at 12:31 PM

## 2021-08-23 NOTE — TELEPHONE ENCOUNTER
Joshua Albarran, pts daughter called. Stated the lung doctor wants Stephanie Guicho released due to the rising covid at [address]. She is concerned about the level of care she will require. Wanted your recommendation on rehabs.

## 2021-08-23 NOTE — PROGRESS NOTES
Date: 8/22/2021    Time: 8:10 PM    Patient Placed On BIPAP/CPAP/ Non-Invasive Ventilation? No    If no must comment. Facial area red/color change? No           If YES are Blister/Lesion present? No   If yes must notify nursing staff  BIPAP/CPAP skin barrier? No    Skin barrier type:n/a       Comments: Patient refused BiPAP for night. Machine is at bedside if needed.         Taj Roque RCP

## 2021-08-24 PROCEDURE — 6360000002 HC RX W HCPCS: Performed by: FAMILY MEDICINE

## 2021-08-24 PROCEDURE — 94640 AIRWAY INHALATION TREATMENT: CPT

## 2021-08-24 PROCEDURE — 2060000000 HC ICU INTERMEDIATE R&B

## 2021-08-24 PROCEDURE — 6370000000 HC RX 637 (ALT 250 FOR IP): Performed by: INTERNAL MEDICINE

## 2021-08-24 PROCEDURE — 6370000000 HC RX 637 (ALT 250 FOR IP): Performed by: FAMILY MEDICINE

## 2021-08-24 PROCEDURE — 97110 THERAPEUTIC EXERCISES: CPT

## 2021-08-24 PROCEDURE — 6370000000 HC RX 637 (ALT 250 FOR IP): Performed by: STUDENT IN AN ORGANIZED HEALTH CARE EDUCATION/TRAINING PROGRAM

## 2021-08-24 PROCEDURE — 97530 THERAPEUTIC ACTIVITIES: CPT

## 2021-08-24 PROCEDURE — 6360000002 HC RX W HCPCS: Performed by: NURSE PRACTITIONER

## 2021-08-24 PROCEDURE — 94660 CPAP INITIATION&MGMT: CPT

## 2021-08-24 PROCEDURE — 6370000000 HC RX 637 (ALT 250 FOR IP): Performed by: NURSE PRACTITIONER

## 2021-08-24 PROCEDURE — 2580000003 HC RX 258: Performed by: FAMILY MEDICINE

## 2021-08-24 RX ORDER — BENZONATATE 100 MG/1
200 CAPSULE ORAL 3 TIMES DAILY
Status: DISCONTINUED | OUTPATIENT
Start: 2021-08-24 | End: 2021-08-25 | Stop reason: HOSPADM

## 2021-08-24 RX ADMIN — DORZOLAMIDE HCL 1 DROP: 20 SOLUTION/ DROPS OPHTHALMIC at 09:26

## 2021-08-24 RX ADMIN — GUAIFENESIN 400 MG: 400 TABLET ORAL at 14:45

## 2021-08-24 RX ADMIN — BENZOCAINE AND MENTHOL 1 LOZENGE: 15; 3.6 LOZENGE ORAL at 13:06

## 2021-08-24 RX ADMIN — BUDESONIDE 500 MCG: 0.5 SUSPENSION RESPIRATORY (INHALATION) at 08:49

## 2021-08-24 RX ADMIN — AMLODIPINE BESYLATE 5 MG: 5 TABLET ORAL at 09:25

## 2021-08-24 RX ADMIN — ARFORMOTEROL TARTRATE 15 MCG: 15 SOLUTION RESPIRATORY (INHALATION) at 20:42

## 2021-08-24 RX ADMIN — ARFORMOTEROL TARTRATE 15 MCG: 15 SOLUTION RESPIRATORY (INHALATION) at 08:49

## 2021-08-24 RX ADMIN — GUAIFENESIN 400 MG: 400 TABLET ORAL at 09:26

## 2021-08-24 RX ADMIN — ENOXAPARIN SODIUM 40 MG: 40 INJECTION SUBCUTANEOUS at 09:26

## 2021-08-24 RX ADMIN — DOXYCYCLINE HYCLATE 100 MG: 100 CAPSULE ORAL at 21:15

## 2021-08-24 RX ADMIN — IPRATROPIUM BROMIDE AND ALBUTEROL SULFATE 1 AMPULE: .5; 3 SOLUTION RESPIRATORY (INHALATION) at 13:12

## 2021-08-24 RX ADMIN — GUAIFENESIN 400 MG: 400 TABLET ORAL at 21:15

## 2021-08-24 RX ADMIN — DOXYCYCLINE HYCLATE 100 MG: 100 CAPSULE ORAL at 09:25

## 2021-08-24 RX ADMIN — ATORVASTATIN CALCIUM 20 MG: 20 TABLET, FILM COATED ORAL at 09:27

## 2021-08-24 RX ADMIN — BENZONATATE 200 MG: 100 CAPSULE ORAL at 21:15

## 2021-08-24 RX ADMIN — BENZONATATE 100 MG: 100 CAPSULE ORAL at 09:26

## 2021-08-24 RX ADMIN — LATANOPROST 1 DROP: 50 SOLUTION/ DROPS OPHTHALMIC at 09:27

## 2021-08-24 RX ADMIN — LEVOTHYROXINE SODIUM 137 MCG: 25 TABLET ORAL at 09:26

## 2021-08-24 RX ADMIN — IPRATROPIUM BROMIDE AND ALBUTEROL SULFATE 1 AMPULE: .5; 3 SOLUTION RESPIRATORY (INHALATION) at 08:49

## 2021-08-24 RX ADMIN — PREDNISONE 20 MG: 20 TABLET ORAL at 09:26

## 2021-08-24 RX ADMIN — IPRATROPIUM BROMIDE AND ALBUTEROL SULFATE 1 AMPULE: .5; 3 SOLUTION RESPIRATORY (INHALATION) at 17:00

## 2021-08-24 RX ADMIN — Medication 10 ML: at 09:26

## 2021-08-24 RX ADMIN — BENZONATATE 200 MG: 100 CAPSULE ORAL at 14:45

## 2021-08-24 RX ADMIN — IPRATROPIUM BROMIDE AND ALBUTEROL SULFATE 3 AMPULE: .5; 3 SOLUTION RESPIRATORY (INHALATION) at 20:41

## 2021-08-24 RX ADMIN — BUDESONIDE 500 MCG: 0.5 SUSPENSION RESPIRATORY (INHALATION) at 20:41

## 2021-08-24 RX ADMIN — DORZOLAMIDE HCL 1 DROP: 20 SOLUTION/ DROPS OPHTHALMIC at 21:17

## 2021-08-24 RX ADMIN — Medication 10 ML: at 21:14

## 2021-08-24 ASSESSMENT — PAIN SCALES - GENERAL: PAINLEVEL_OUTOF10: 0

## 2021-08-24 NOTE — PROGRESS NOTES
Physical Therapy    Facility/Department: [address] INTERNAL MEDICINE 2  Treatment note    NAME: Kemar Stringer  : 1940  MRN: [id number]    Date of Service: 2021      Patient Diagnosis(es): The encounter diagnosis was COPD exacerbation ([facility] [address] has a past medical history of Anesthesia, Breast cancer ([facility] [address] COPD with exacerbation ([facility] [address] Glaucoma, History of colon polyps, Hyperlipidemia, Hypothyroidism, Osteoarthritis, and Pneumonia. has a past surgical history that includes Hysterectomy; Breast surgery; Breast lumpectomy (2012); other surgical history (12); Appendectomy; Colonoscopy (2013); Colonoscopy ([id number]); and eye surgery. Evaluating Therapist: Elizabeth Maradiaga PT      Room #:  3120/3647-A  Diagnosis:  Acute exacerbation of chronic obstructive pulmonary disease (COPD) ([facility] [address] [J44.1]  COPD exacerbation ([facility]) [J44.1]  PMHx/PSHx:  Breast CA, COPD, Lung CA  Precautions:  Falls, O2      Social:  Pt lives alone but plans to discharge to one of her daughters homes. Both are one floor with 1-2 steps to enter. .  Prior to admission Independent without device. Has ww from previous surgery. Initial Evaluation  Date: 21 Treatment  21    Short Term/ Long Term   Goals   Was pt agreeable to Eval/treatment?  yes yes    Does pt have pain? no B knee ache during exercise    Bed Mobility  Rolling: min assist  Supine to sit: NT  Sit to supine: Min assist  Scooting: min assist NT SBA   Transfers Sit to stand: min assist  Stand to sit: min assist  Stand pivot: NT Sit <> stand: Min A SBA   Ambulation    25 feet with ww with mod asssit 35 feet x 1 using WW for support Min/Mod A for balance 100 feet with ww with CGA   Stair Negotiation  Ascended and descended  NT NT  2 steps with 1 rail with CGA   LE strength     3+/5 4/5   4-/5   balance      Fair-     AM-PAC Raw score               15/24 16/24          Pt is alert and able to follow instruction  Balance: poor dynamic using [facility] for support    Pt performed therapeutic exercise of the following: seated B ankle pumps AROM; B LAQ's/hamstring curls, hip ABd/ADd and heel slide motions AROM/minimial manual resistance x 20    Patient education  Pt was educated on exercise promoting circulation and strengthening, UE usage to assist with transfers, gait promoting staying within [employer] base of support    Patient response to education:   Pt verbalized understanding Pt demonstrated skill Pt requires further education in this area   yes With instruction yes     ASSESSMENT:   Comments: Pt found in a bedside chair, agreed to rx. Gait performed in the room, mackenzie slow, inconsistent and laboring. Pt unsteady throughout, required constant hands on assist for balance and safety. Pt unsafe to gait or transfer alone presently. After gait, exercise performed. Treatment: Pt practiced and was instructed in the following treatment: therapeutic exercise, transfer safety, WW mechanics    Pt was left in a bedside chair as found with call light in reach    Time in 0757   Time out 0823   Total Treatment Time 26 minutes   CPT codes:     Therapeutic activities 29017 13 minutes   Therapeutic exercises 91647 13 minutes       Pt is making fair progress toward established Physical Therapy goals. Continue with physical therapy current plan of care.     Carina Evans PTA   License Number: [id number]

## 2021-08-24 NOTE — PROGRESS NOTES
%  SpO2: 98 %  SpO2/FiO2 ratio: 198  I Time/ I Time %: 0.9 s  Mask Type: Full face mask  Mask Size: Medium       IPAP: 12 cmH20  CPAP/EPAP: 6 cmH2O     CURRENT MEDS :  Scheduled Meds:   benzonatate  200 mg Oral TID    predniSONE  20 mg Oral Daily    doxycycline hyclate  100 mg Oral 2 times per day    dorzolamide  1 drop Both Eyes BID    latanoprost  1 drop Both Eyes Daily    guaiFENesin  400 mg Oral TID    ipratropium-albuterol  3 ampule Inhalation Q4H WA    levothyroxine  137 mcg Oral Daily    pantoprazole  40 mg Oral QAM AC    atorvastatin  20 mg Oral Daily    sodium chloride flush  5-40 mL Intravenous 2 times per day    enoxaparin  40 mg Subcutaneous Daily    budesonide  0.5 mg Nebulization BID    Arformoterol Tartrate  15 mcg Nebulization BID    amLODIPine  5 mg Oral Daily       Physical Exam:  General Appearance: appears comfortable in no acute distress. HEENT: Normocephalic atraumatic without obvious abnormality   Neck: Lips, mucosa, and tongue normal.  Supple, symmetrical, trachea midline, no adenopathy;thyroid:  no enlargement/tenderness/nodules or JVD. Lung: Breath sounds CTA. Respirations unlabored. Symmetrical expansion. Heart: RRR, normal S1, S2. No MRG  Abdomen: Soft, NT, ND. BS present x 4 quadrants. No bruit or organomegaly. Extremities: Pedal pulses 2+ symmetric b/l. Extremities normal, no cyanosis, clubbing, or edema. Musculokeletal: No joint swelling, no muscle tenderness. ROM normal in all joints of extremities. Neurologic: Mental status: Alert and Oriented X3 . Pertinent/ New Labs and Imaging Studies     Imaging Personally Reviewed:  CXR 8/22  Hazy opacity is seen in the left basilar region.  The right lung is grossly   clear.  The cardiac silhouette is magnified. No pneumothorax or pleural   effusion is seen. ECHO 8/22/21   Tachycardia noted. Normal left ventricular size. LV systolic function is hyperdynamic.    Ejection fraction is visually estimated at 75-80%. There is near obliteration of the LV cavity during systole. There appears to be an intracavitary gradient approximately 20 mmHg. Indeterminate diastolic function. No regional wall motion abnormalities seen. Normal left ventricular wall thickness. Normal right ventricular size and function. No significant valvular abnormalities. Signature      ----------------------------------------------------------------   Electronically signed by Katelin Grant MD(Interpreting   physician) on 08/22/2021 12:47 PM    Labs:  Lab Results   Component Value Date    WBC 9.1 08/21/2021    HGB 15.0 08/21/2021    HCT 45.2 08/21/2021    MCV 90.8 08/21/2021    MCH 30.1 08/21/2021    MCHC 33.2 08/21/2021    RDW 12.6 08/21/2021     08/21/2021    MPV 11.6 08/21/2021     Lab Results   Component Value Date     08/20/2021    K 4.5 08/20/2021     08/20/2021    CO2 25 08/20/2021    BUN 10 08/20/2021    CREATININE 0.6 08/20/2021    LABALBU 4.0 03/15/2021    CALCIUM 9.8 08/20/2021    GFRAA >60 08/20/2021    LABGLOM >60 08/20/2021     Lab Results   Component Value Date    PROTIME 11.4 08/30/2016    INR 1.1 08/30/2016     No results for input(s): PROBNP in the last 72 hours. No results for input(s): PROCAL in the last 72 hours. This SmartLink has not been configured with any valid records. Micro:  8/20 COVID negative  8/20 respiratory panel negative     Assessment:    1. Acute respiratory failure with hypoxia and hypercapnia  2. COPD with acute exacerbation. Prior PFTs 2019 GOLD stage I mild   3. Hx of Stage Ib adenocarcinoma left lung with VATS left upper lobectomy September 19, 2017 [facility]  4. History of breast cancer 2012 treated with resection and radiation to the right breast  5. Allergic rhinitis  6. 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 GERD  8.  Thyroid disease      Plan:   · Monitor off oxygen, on RA, will add flutter valve  · Increase Tessalon dose, add Cepacol

## 2021-08-24 NOTE — CARE COORDINATION
Social Work/Discharge Planning:  Met with patient and informed her of [facility] at [facility].  Patient states they prefer another facility. Patient called her daughter Patricia Crockett on speaker phone and she confirmed they prefer [facility]. Referral made to Song Wilcox with [address] [place] [address] and requested for facility to start pre-cert if they can accept. Cancelled referral to Irasema Robles with [facility] at [facility]. Will continue to follow. Electronically signed by STACI Pina on 8/24/2021 at 10:38 AM    Addendum:  Song [facility] with [facility] states facility can accept and will start pre-cert. HENS completed and N-17 in Epic. Will continue to follow and wait for pre-cert.   Electronically signed by STACI Pina on 8/24/2021 at 2:22 PM

## 2021-08-24 NOTE — CARE COORDINATION
Social Work/Discharge Planning:  Received notification late yesterday from patient daughter Jamil Villeda that family wants rehab at [facility]. Referral made this morning to [facility] with [facility]. Patient does not need updated therapy. Provided update to Socorro Cleary [id number] with [address] regarding patient going to rehab if approved. Will continue to follow. Electronically signed by STACI Mendez on 8/24/2021 at 8:18 AM    Addendum:  [facility] with [facility] states facility can accept patient and will start pre-cert. Will continue to follow and wait for pre-cert.   Electronically signed by STACI Mendez on 8/24/2021 at 9:33 AM

## 2021-08-24 NOTE — DISCHARGE INSTR - COC
Continuity of Care Form    Patient Name: Nicole Bird   :  1940  MRN:  [id number]    Admit date:  2021  Discharge date:  2021    Code Status Order: DNR-CC   Advance Directives:     Admitting Physician:  Juan Daniel Hansen MD  PCP: Juan Daniel Hansen MD    [facility]42-D  Discharging Unit Phone Number: [phone]    Emergency Contact:   Extended Emergency Contact Information  Primary Emergency Contact: [address] Phone: [phone]  Mobile Phone: [phone]  Relation: Child  Secondary Emergency Contact: [address] Phone: [phone]  Mobile Phone: [phone]  Relation: Other    Past Surgical History:  Past Surgical History:   Procedure Laterality Date    APPENDECTOMY      BREAST LUMPECTOMY  2012    Right    BREAST SURGERY      left breast > benign 40 + years ago    COLONOSCOPY  2013    COLONOSCOPY  [id number]    EYE SURGERY      HYSTERECTOMY      CARMELITA    OTHER SURGICAL HISTORY  12    r needle localized axillary sentinel lymph node exision       Immunization History:   Immunization History   Administered Date(s) Administered    COVID-19, [place], PF, 30mcg/0.3mL 2021, 02/15/2021    Influenza, High Dose (Fluzone 65 yrs and older) 10/03/2014, 10/07/2015, 10/04/2016, 2017, 2018    Influenza, Quadv, adjuvanted, 65 yrs +, IM, PF (Fluad) 2020    Influenza, Triv, inactivated, subunit, adjuvanted, IM (Fluad 65 yrs and older) 2019    Pneumococcal Conjugate 13-valent ([id number]) 10/07/2015    Pneumococcal Polysaccharide (Ozoirylwr96) 10/08/2007    Zoster Recombinant (Shingrix) 2019, 2019       Active Problems:  Patient Active Problem List   Diagnosis Code    History of colon polyps Z86.010    Family history of colon cancer Z80.0    Personal history of breast cancer Z85.3    Hypothyroidism E03.9    Glaucoma H40.9    Hyperlipidemia E78.5    Disease of lung J98.4    Nodule of left lung R91.1    Adenocarcinoma, lung, left ([facility]) C34.92    Raynaud's phenomenon without gangrene I73.00    Venous insufficiency of both lower extremities I87.2    Acute exacerbation of chronic obstructive pulmonary disease (COPD) ([facility]) J44.1       Isolation/Infection:   Isolation          No Isolation        Patient Infection Status     Infection Onset Added Last Indicated Last Indicated By Review Planned Expiration Resolved Resolved By    None active    Resolved    COVID-19 Rule Out 08/20/21 08/20/21 08/20/21 COVID-19, Rapid (Ordered)   08/20/21 Rule-Out Test Resulted          Nurse Assessment:  Last Vital Signs: /72   Pulse 98   Temp 98.3 °F (36.8 °C) (Oral)   Resp 20   Ht 5' 3.5\" (1.613 m)   Wt 173 lb (78.5 kg)   SpO2 94%   BMI 30.16 kg/m²     Last documented pain score (0-10 scale): Pain Level: 0  Last Weight:   Wt Readings from Last 1 Encounters:   08/22/21 173 lb (78.5 kg)     Mental Status:  oriented and alert    IV Access:  - None    Nursing Mobility/ADLs:  Walking   Assisted  Transfer  Assisted  Bathing  Assisted  Dressing  Assisted  Toileting  Assisted  Feeding  Independent  Med Admin  Independent  Med Delivery   none    Wound Care Documentation and Therapy:        Elimination:  Continence:   · Bowel: Yes  · Bladder: Yes  Urinary Catheter: None   Colostomy/Ileostomy/Ileal Conduit: No       Date of Last BM: 08/24/2021    Intake/Output Summary (Last 24 hours) at 8/24/2021 1424  Last data filed at 8/24/2021 1213  Gross per 24 hour   Intake 770 ml   Output 500 ml   Net 270 ml     I/O last 3 completed shifts: In: 36 [P.O.:355]  Out: 900 [Urine:900]    Safety Concerns: At Risk for Falls    Impairments/Disabilities:      Vision    Nutrition Therapy:  Current Nutrition Therapy:   - Oral Diet:  General    Routes of Feeding: Oral  Liquids:  Thin Liquids  Daily Fluid Restriction: no  Last Modified Barium Swallow with Video (Video Swallowing Test): not done    Treatments at the Time of Hospital Discharge:   Respiratory Treatments: See [facility]  Oxygen Therapy:  is not on home oxygen therapy. Ventilator:    - No ventilator support    Rehab Therapies: {THERAPEUTIC INTERVENTION:[id number]}  Weight Bearing Status/Restrictions: No weight bearing restirctions  Other Medical Equipment (for information only, NOT a DME order):  none  Other Treatments: none    Patient's personal belongings (please select all that are sent with patient):  Glasses    RN SIGNATURE:  Electronically signed by Abril Mcpherson RN on 8/25/21 at 3:47 PM EDT    CASE MANAGEMENT/SOCIAL WORK SECTION    Inpatient Status Date: 8/20/2021    Readmission Risk Assessment Score:  Readmission Risk              Risk of Unplanned Readmission:  16           Discharging to Facility/ Agency   · Name: [facility]  · Address: [address] [place] [address], [place] [address]  · Phone: [phone]  · Fax: [phone]    Dialysis Facility (if applicable)   · Name:  · Address:  · Dialysis Schedule:  · Phone:  · Fax:    / signature: Electronically signed by STACI Mejia on 8/24/21 at 2:25 PM EDT    PHYSICIAN SECTION    Prognosis: Good    Condition at Discharge: Stable    Rehab Potential (if transferring to Rehab): Good    Recommended Labs or Other Treatments After Discharge: ***    Physician Certification: I certify the above information and transfer of Lidia Leos  is necessary for the continuing treatment of the diagnosis listed and that she requires [place] for less 30 days.      Update Admission H&P: No change in H&P    PHYSICIAN SIGNATURE:  {Esignature:[id number]}

## 2021-08-25 VITALS
HEART RATE: 110 BPM | DIASTOLIC BLOOD PRESSURE: 58 MMHG | TEMPERATURE: 98 F | HEIGHT: 64 IN | BODY MASS INDEX: 29.71 KG/M2 | WEIGHT: 174 LBS | SYSTOLIC BLOOD PRESSURE: 120 MMHG | RESPIRATION RATE: 22 BRPM | OXYGEN SATURATION: 95 %

## 2021-08-25 PROCEDURE — 94660 CPAP INITIATION&MGMT: CPT

## 2021-08-25 PROCEDURE — 6370000000 HC RX 637 (ALT 250 FOR IP): Performed by: STUDENT IN AN ORGANIZED HEALTH CARE EDUCATION/TRAINING PROGRAM

## 2021-08-25 PROCEDURE — 94640 AIRWAY INHALATION TREATMENT: CPT

## 2021-08-25 PROCEDURE — 6370000000 HC RX 637 (ALT 250 FOR IP): Performed by: NURSE PRACTITIONER

## 2021-08-25 PROCEDURE — 2580000003 HC RX 258: Performed by: FAMILY MEDICINE

## 2021-08-25 PROCEDURE — 97530 THERAPEUTIC ACTIVITIES: CPT

## 2021-08-25 PROCEDURE — 6360000002 HC RX W HCPCS: Performed by: FAMILY MEDICINE

## 2021-08-25 PROCEDURE — 6370000000 HC RX 637 (ALT 250 FOR IP): Performed by: FAMILY MEDICINE

## 2021-08-25 PROCEDURE — 99238 HOSP IP/OBS DSCHRG MGMT 30/<: CPT | Performed by: FAMILY MEDICINE

## 2021-08-25 PROCEDURE — 97535 SELF CARE MNGMENT TRAINING: CPT

## 2021-08-25 PROCEDURE — 94760 N-INVAS EAR/PLS OXIMETRY 1: CPT

## 2021-08-25 PROCEDURE — 6370000000 HC RX 637 (ALT 250 FOR IP): Performed by: INTERNAL MEDICINE

## 2021-08-25 PROCEDURE — 6360000002 HC RX W HCPCS: Performed by: NURSE PRACTITIONER

## 2021-08-25 RX ORDER — AMLODIPINE BESYLATE 5 MG/1
5 TABLET ORAL DAILY
Qty: 30 TABLET | Refills: 3
Start: 2021-08-26 | End: 2021-09-20 | Stop reason: ALTCHOICE

## 2021-08-25 RX ORDER — ALPRAZOLAM 0.5 MG/1
0.5 TABLET ORAL DAILY PRN
Qty: 30 TABLET | Refills: 0 | Status: SHIPPED | OUTPATIENT
Start: 2021-08-25 | End: 2021-09-20 | Stop reason: ALTCHOICE

## 2021-08-25 RX ADMIN — IPRATROPIUM BROMIDE AND ALBUTEROL SULFATE 3 AMPULE: .5; 3 SOLUTION RESPIRATORY (INHALATION) at 15:45

## 2021-08-25 RX ADMIN — IPRATROPIUM BROMIDE AND ALBUTEROL SULFATE 3 AMPULE: .5; 3 SOLUTION RESPIRATORY (INHALATION) at 08:00

## 2021-08-25 RX ADMIN — BUDESONIDE 500 MCG: 0.5 SUSPENSION RESPIRATORY (INHALATION) at 08:01

## 2021-08-25 RX ADMIN — BENZONATATE 200 MG: 100 CAPSULE ORAL at 14:34

## 2021-08-25 RX ADMIN — DOXYCYCLINE HYCLATE 100 MG: 100 CAPSULE ORAL at 07:54

## 2021-08-25 RX ADMIN — GUAIFENESIN 400 MG: 400 TABLET ORAL at 07:53

## 2021-08-25 RX ADMIN — PREDNISONE 20 MG: 20 TABLET ORAL at 07:55

## 2021-08-25 RX ADMIN — LATANOPROST 1 DROP: 50 SOLUTION/ DROPS OPHTHALMIC at 07:55

## 2021-08-25 RX ADMIN — Medication 10 ML: at 07:55

## 2021-08-25 RX ADMIN — PANTOPRAZOLE SODIUM 40 MG: 40 TABLET, DELAYED RELEASE ORAL at 06:22

## 2021-08-25 RX ADMIN — LEVOTHYROXINE SODIUM 137 MCG: 25 TABLET ORAL at 07:54

## 2021-08-25 RX ADMIN — ARFORMOTEROL TARTRATE 15 MCG: 15 SOLUTION RESPIRATORY (INHALATION) at 08:01

## 2021-08-25 RX ADMIN — IPRATROPIUM BROMIDE AND ALBUTEROL SULFATE 3 AMPULE: .5; 3 SOLUTION RESPIRATORY (INHALATION) at 12:30

## 2021-08-25 RX ADMIN — AMLODIPINE BESYLATE 5 MG: 5 TABLET ORAL at 07:53

## 2021-08-25 RX ADMIN — ATORVASTATIN CALCIUM 20 MG: 20 TABLET, FILM COATED ORAL at 07:54

## 2021-08-25 RX ADMIN — DORZOLAMIDE HCL 1 DROP: 20 SOLUTION/ DROPS OPHTHALMIC at 07:56

## 2021-08-25 RX ADMIN — ENOXAPARIN SODIUM 40 MG: 40 INJECTION SUBCUTANEOUS at 07:52

## 2021-08-25 RX ADMIN — GUAIFENESIN 400 MG: 400 TABLET ORAL at 14:34

## 2021-08-25 RX ADMIN — BENZONATATE 200 MG: 100 CAPSULE ORAL at 07:53

## 2021-08-25 ASSESSMENT — PAIN SCALES - GENERAL
PAINLEVEL_OUTOF10: 0
PAINLEVEL_OUTOF10: 0

## 2021-08-25 NOTE — PROGRESS NOTES
Physical Therapy  Facility/Department: [address] INTERNAL MEDICINE 2  Daily Treatment Note  NAME: Yael Wei  : 1940  MRN: [id number]    Date of Service: 2021    Patient Diagnosis(es): The encounter diagnosis was COPD exacerbation ([facility] [address] has a past medical history of Anesthesia, Breast cancer ([facility] [address] COPD with exacerbation ([facility] [address] Glaucoma, History of colon polyps, Hyperlipidemia, Hypothyroidism, Osteoarthritis, and Pneumonia. has a past surgical history that includes Hysterectomy; Breast surgery; Breast lumpectomy (2012); other surgical history (12); Appendectomy; Colonoscopy (2013); Colonoscopy ([id number]); and eye surgery. Evaluating Therapist: Asher Allne PT        Room #:  5221/1078-U  Diagnosis:  Acute exacerbation of chronic obstructive pulmonary disease (COPD) ([facility] [address] [facility] [J44.1]  COPD exacerbation ([facility]) [J44.1]  PMHx/PSHx:  Breast CA, COPD, Lung CA  Precautions:  Falls, O2        Social:  Pt lives alone but plans to discharge to one of her daughters homes. Both are one floor with 1-2 steps to enter. .  Prior to admission Independent without device. Has ww from previous surgery.        Initial Evaluation  Date: 21 Treatment  21    Short Term/ Long Term   Goals   Was pt agreeable to Eval/treatment?  yes yes     Does pt have pain? no No c/o pain     Bed Mobility  Rolling: min assist  Supine to sit: NT  Sit to supine: Min assist  Scooting: min assist Rolling:  SBA  Supine to sit:  SBA  Sit to supine:  NT  Scooting:  SBA to sitting EOB SBA   Transfers Sit to stand: min assist  Stand to sit: min assist  Stand pivot: NT Sit to stand:  Min A  Stand to sit:  Min A  Stand pivot:  Min A with w/w SBA   Ambulation    25 feet with ww with mod asssit 30 feet with w/w Min A 100 feet with ww with CGA   Stair Negotiation  Ascended and descended  NT NT  2 steps with 1 rail with CGA   LE strength     3+/5 4/5   4-/5   balance      Fair-       AM-PAC Raw score               15/24 17/24           Patient education  Pt educated on PT objectives during treatment session, hand placement during transfers. Patient response to education:   Pt verbalized understanding Pt demonstrated skill Pt requires further education in this area   yes With cueing yes     ASSESSMENT:    Comments:  Pt found in bed and left sitting up in the chair with call light in reach. Treatment:  Patient practiced and was instructed in the following treatment:    Functional mobility performed as documented above. No report of dizziness during functional mobility. Cueing required for hand placement during transfers. No LOB during ambulation. Pt reported feeling tired during treatment session. PLAN:    Patient is making good progress towards established goals. Will continue with current POC.      Time in  1040  Time out  1051    Total Treatment Time  11 minutes     CPT codes:    [x] Therapeutic activities 82950 11minutes  [] Therapeutic exercises 46388  minutes      Judy Urena, [address]

## 2021-08-25 NOTE — CARE COORDINATION
Social Work/Discharge Planning:  Received notification from Nita Leija with [facility] of pre-cert approval and good until 8/31. Notified charge nurse. Will continue to follow. Electronically signed by STACI Sullivan on 8/25/2021 at 3:13 PM    Addendum:  Notified patient and her daughter Yessica Mendoza (ph: [phone]) of insurance approval.  Yessica Mendoza states she will transport her mother to [facility]. Confirmed with Nita Leija at [address] [place] [place] [address].   Electronically signed by STACI Sullivan on 8/25/2021 at 3:15 PM

## 2021-08-25 NOTE — CARE COORDINATION
Social Work/Discharge Planning:  Called liaison Regina Roe with [facility] and confirmed patient pre-cert is still pending. Provided update to patient. Will continue to follow and wait for pre-cert.   Electronically signed by STACI Leo on 8/25/2021 at 12:12 PM

## 2021-08-25 NOTE — PROGRESS NOTES
Admit Date: 8/20/2021       Subjective:  Chief Complaint   Patient presents with    Shortness of Breath     Sob for the past few days, today sob increased , pcp placed on antibiotics for lung infection, left upper lobe removed due to cancer            Objective:    bp's 140's/ 70's   on room air now    Duoneb, brovana, IV steroids    Planning to reab when precert done    Still very weak on ambulation  Daughter getting home ready for her to move in with them      Scheduled Meds:  Current Facility-Administered Medications   Medication Dose Route Frequency Provider Last Rate Last Admin    benzonatate (TESSALON) capsule 200 mg  200 mg Oral TID LIDIA Grant CNP   200 mg at 08/24/21 2115    benzocaine-menthol (CEPACOL SORE THROAT) lozenge 1 lozenge  1 lozenge Oral Q2H PRN LIDIA Grant CNP   1 lozenge at 08/24/21 1306    predniSONE (DELTASONE) tablet 20 mg  20 mg Oral Daily LIDIA Grant CNP   20 mg at 08/24/21 0197    doxycycline hyclate (VIBRAMYCIN) capsule 100 mg  100 mg Oral 2 times per day LIDIA Grant CNP   100 mg at 08/24/21 2115    dorzolamide (TRUSOPT) 2 % ophthalmic solution 1 drop  1 drop Both Eyes BID Isabel Frazier MD   1 drop at 08/24/21 2117    latanoprost (XALATAN) 0.005 % ophthalmic solution 1 drop  1 drop Both Eyes Daily Isabel Frazier MD   1 drop at 08/24/21 0927    guaiFENesin-dextromethorphan (ROBITUSSIN DM) 100-10 MG/5ML syrup 5 mL  5 mL Oral Q4H PRN Isabel Frazier MD   5 mL at 08/21/21 1048    guaiFENesin tablet 400 mg  400 mg Oral TID Katelyn Bloom DO   400 mg at 08/24/21 2115    ipratropium-albuterol (DUONEB) nebulizer solution 3 ampule  3 ampule Inhalation Q4H JAMIE Acosta DO   3 ampule at 08/24/21 2041    iopamidol (ISOVUE-370) 76 % injection 75 mL  75 mL IntraVENous ONCE PRN Archie Elaine DO        levothyroxine (SYNTHROID) tablet 137 mcg  137 mcg Oral Daily Isabel Frazier MD   137 mcg at 08/24/21 0903    pantoprazole (PROTONIX) tablet 40 mg  40 mg Oral QAM AC Tom Huntley MD   40 mg at 08/25/21 0622    atorvastatin (LIPITOR) tablet 20 mg  20 mg Oral Daily Tom Huntley MD   20 mg at 08/24/21 7054    sodium chloride flush 0.9 % injection 5-40 mL  5-40 mL IntraVENous 2 times per day Tom Huntley MD   10 mL at 08/24/21 2114    sodium chloride flush 0.9 % injection 5-40 mL  5-40 mL IntraVENous PRN Tom Huntley MD        0.9 % sodium chloride infusion  25 mL IntraVENous PRN Tom Huntley MD        ondansetron (ZOFRAN-ODT) disintegrating tablet 4 mg  4 mg Oral Q8H PRN Tom Huntley MD        Or    ondansetron [facility]) injection 4 mg  4 mg IntraVENous Q6H PRN Tom Huntley MD        polyethylene glycol (GLYCOLAX) packet 17 g  17 g Oral Daily PRN Tom Huntley MD        enoxaparin (LOVENOX) injection 40 mg  40 mg Subcutaneous Daily Tom Huntley MD   40 mg at 08/24/21 0926    acetaminophen (TYLENOL) tablet 650 mg  650 mg Oral Q6H PRN Tom Huntley MD   650 mg at 08/23/21 1730    Or    acetaminophen (TYLENOL) suppository 650 mg  650 mg Rectal Q6H PRN Tom Huntley MD        budesonide (PULMICORT) nebulizer suspension 500 mcg  0.5 mg Nebulization BID Tom Huntley MD   500 mcg at 08/24/21 2041    Arformoterol Tartrate (BROVANA) nebulizer solution 15 mcg  15 mcg Nebulization BID LIDIA White CNP   15 mcg at 08/24/21 2042    perflutren lipid microspheres (DEFINITY) injection 1.65 mg  1.5 mL IntraVENous ONCE PRN Tavo Thompson APRN - CNP        ALPRAZolam Trenia Fury) tablet 0.5 mg  0.5 mg Oral BID PRN Tom Huntley MD   0.5 mg at 08/23/21 2318    amLODIPine (NORVASC) tablet 5 mg  5 mg Oral Daily Tom Huntley MD   5 mg at 08/24/21 0925       Continuous Infusions  :   sodium chloride         PRN Meds:  benzocaine-menthol, guaiFENesin-dextromethorphan, iopamidol, sodium chloride flush, sodium chloride, ondansetron **OR** ondansetron, polyethylene glycol, acetaminophen **OR** acetaminophen, perflutren lipid microspheres, ALPRAZolam      Wt Readings from Last 3 Encounters:   08/25/21 174 lb (78.9 kg)   03/31/21 180 lb (81.6 kg)   09/29/20 179 lb (81.2 kg)     I/O last 3 completed shifts: In: 770 [P.O.:770]  Out: 500 [Urine:500]    Intake/Output Summary (Last 24 hours) at 8/25/2021 0639  Last data filed at 8/24/2021 1213  Gross per 24 hour   Intake 770 ml   Output 500 ml   Net 270 ml       Vitals:    08/25/21 0000   BP: (!) 144/70   Pulse: 88   Resp: 18   Temp: 98.4 °F (36.9 °C)   SpO2: 96%       Physical Exam:    Skin:    Warm and dry. No rash or bruises    Neck:    No JVD, No thyromegaly, No carotid bruit  Cardiac:   RRR, No gallop or murmur  Lungs:  Lungs with debra rhonchi, decreased breath sounds  Abdomen:  Normal bowel sounds, abd soft, non-tender, no rebound or guarding  Extremities:   No clubbing, edema or cyanosis  Neurological:   A & O x 3, Moves all extremities, normal DTR                CBC  No results for input(s): WBC, HGB, HCT, MCV, PLT in the last 72 hours. BMP  No results for input(s): NA, K, CL, CO2, BUN, CREATININE, LABGLOM, CALCIUM in the last 72 hours. Invalid input(s): GLU  LFT's  No results for input(s): ALT in the last 72 hours. Invalid input(s):  AST,  ALKPHOS,  BILITOT,  BILIDIR  INR: No results for input(s): INR in the last 72 hours. Lab Results   Component Value Date    CRP 0.8 (H) 08/20/2021     Lab Results   Component Value Date    SEDRATE 13 08/20/2021     No results for input(s): POCGLU in the last 72 hours. Lipids: No results for input(s): CHOL, HDL in the last 72 hours.     Invalid input(s): LDLCALCU  ABGs: No results found for: PHART, PO2ART, VCB2IVV  SpO2 Readings from Last 1 Encounters:   08/25/21 96%       Last 3 Troponin:    Lab Results   Component Value Date    TROPONINI <0.01 11/14/2015    TROPONINI <0.01 11/14/2015    TROPONINI <0.01 11/13/2015     Lab Results   Component Value Date    CKTOTAL 44 11/13/2015    CKMB 2.6 11/13/2015    TROPONINI <0.01 11/14/2015    TROPONINI <0.01 11/14/2015    TROPONINI <0.01 11/13/2015        TSH:    Lab Results   Component Value Date    TSH 0.222 03/15/2021      Vent Information  Skin Assessment: Clean, dry, & intact  FiO2 : 50 %  SpO2: 96 %  SpO2/FiO2 ratio: 198  I Time/ I Time %: 0.9 s  Mask Type: Full face mask  Mask Size: Medium      U/A:     Lab Results   Component Value Date    COLORU Straw 11/13/2015    PHUR 6.0 11/13/2015    WBCUA 0-1 11/13/2015    RBCUA 0-1 11/13/2015    BACTERIA RARE 11/13/2015    CLARITYU Clear 11/13/2015    SPECGRAV 1.020 11/13/2015    LEUKOCYTESUR SMALL 11/13/2015    UROBILINOGEN 0.2 11/13/2015    BILIRUBINUR Negative 11/13/2015    BLOODU SMALL 11/13/2015    GLUCOSEU Negative 11/13/2015          Iron studies:No results found for: FERRITIN  Bone disease:No results found for: PTH, MG, PHOS  Nutrition:No results found for: ALB           Assessment:  Patient Active Problem List   Diagnosis Code    History of colon polyps Z86.010    Family history of colon cancer Z80.0    Personal history of breast cancer Z85.3    Hypothyroidism E03.9    Glaucoma H40.9    Hyperlipidemia E78.5    Disease of lung J98.4    Nodule of left lung R91.1    Adenocarcinoma, lung, left ([facility] C34.92    Raynaud's phenomenon without gangrene I73.00    Venous insufficiency of both lower extremities I87.2    Acute exacerbation of chronic obstructive pulmonary disease (COPD) ([facility] J44.1          1. Acute hypercapneic respiratory failure     2. . exacerbation COPD               PFT June 2019 with mild obstruction no significant bronchodilator response     3. Hypothyroid     4.  Glaucoma     5. Hyperlipidemia     6.  Elevated bp  Star amlodipine           Plan:   Iv steroids  For KEITH  Cont present pulm care    Bernadine Reyes MD MLANDY    8/25/2021

## 2021-08-25 NOTE — PLAN OF CARE
Problem: Falls - Risk of:  Goal: Will remain free from falls  Description: Will remain free from falls  8/24/2021 2312 by Rivas Hope RN  Outcome: Met This Shift     Problem: Falls - Risk of:  Goal: Absence of physical injury  Description: Absence of physical injury  8/24/2021 2312 by Rivas Hope RN  Outcome: Met This Shift

## 2021-08-25 NOTE — PROGRESS NOTES
Justin Marc M.D.,[facility]  Tyrel An D.O., F.A.C.O.I., Jacobo Carroll M.D. Surinder West M.D. Jim Murray D.O. Daily Pulmonary Progress Note    Patient:  Vianey Cast 78 y.o. female MRN: [id number]     Date of Service: 8/25/2021      Synopsis     We are following patient for COPD exacerbation    \"CC\"  Shortness of breath     Code status: DNR CC      Subjective      Patient was seen and examined. Up to chair on RA without distress. Patient complains of coughing fits occasionally especially after breathing treatments and since starting oral Prednisone. She is awaiting approval to go to a rehab facility before going home with her daughter. Review of Systems:  Constitutional: Denies fever, weight loss, night sweats, and fatigue  Skin: Denies pigmentation, dark lesions, and rashes   HEENT: Denies hearing loss, tinnitus, ear drainage, epistaxis, sore throat, and hoarseness. Cardiovascular: Denies palpitations, chest pain, and chest pressure. Respiratory: Denies dyspnea at rest, hemoptysis, apnea, and choking.  Positive cough  Gastrointestinal: Denies nausea, vomiting, poor appetite, diarrhea, heartburn or reflux  Genitourinary: Denies dysuria, frequency, urgency or hematuria  Musculoskeletal: Denies myalgias, muscle weakness, and bone pain  Neurological: Denies dizziness, vertigo, headache, and focal weakness  Psychological: Denies anxiety and depression  Endocrine: Denies heat intolerance and cold intolerance  Hematopoietic/Lymphatic: Denies bleeding problems and blood transfusions    24-hour events:  None     Objective   Vitals: BP (!) 160/70   Pulse 80   Temp 97.8 °F (36.6 °C) (Oral)   Resp 18   Ht 5' 3.5\" (1.613 m)   Wt 174 lb (78.9 kg)   SpO2 96%   BMI 30.34 kg/m²     I/O:  No intake or output data in the 24 hours ending 08/25/21 1410    Vent Information  Skin Assessment: Clean, dry, & intact  FiO2 : 50 %  SpO2: 96 %  SpO2/FiO2 ratio: 198  I Time/ I Time %: 0.9 s  Mask Type: Full face mask  Mask Size: Medium       IPAP: 12 cmH20  CPAP/EPAP: 6 cmH2O     CURRENT MEDS :  Scheduled Meds:   benzonatate  200 mg Oral TID    predniSONE  20 mg Oral Daily    dorzolamide  1 drop Both Eyes BID    latanoprost  1 drop Both Eyes Daily    guaiFENesin  400 mg Oral TID    ipratropium-albuterol  3 ampule Inhalation Q4H WA    levothyroxine  137 mcg Oral Daily    pantoprazole  40 mg Oral QAM AC    atorvastatin  20 mg Oral Daily    sodium chloride flush  5-40 mL IntraVENous 2 times per day    enoxaparin  40 mg Subcutaneous Daily    budesonide  0.5 mg Nebulization BID    Arformoterol Tartrate  15 mcg Nebulization BID    amLODIPine  5 mg Oral Daily       Physical Exam:  General Appearance: appears comfortable in no acute distress. HEENT: Normocephalic atraumatic without obvious abnormality   Neck: Lips, mucosa, and tongue normal.  Supple, symmetrical, trachea midline, no adenopathy;thyroid:  no enlargement/tenderness/nodules or JVD. Lung: Breath sounds CTA. Respirations unlabored. Symmetrical expansion. Heart: RRR, normal S1, S2. No MRG  Abdomen: Soft, NT, ND. BS present x 4 quadrants. No bruit or organomegaly. Extremities: Pedal pulses 2+ symmetric b/l. Extremities normal, no cyanosis, clubbing, or edema. Musculokeletal: No joint swelling, no muscle tenderness. ROM normal in all joints of extremities. Neurologic: Mental status: Alert and Oriented X3 . Pertinent/ New Labs and Imaging Studies     Imaging Personally Reviewed:  CXR 8/22  Hazy opacity is seen in the left basilar region.  The right lung is grossly   clear.  The cardiac silhouette is magnified. No pneumothorax or pleural   effusion is seen. CTA pulmonary 8/20  1. There is no evidence of a pulmonary embolus   2. The lungs are clear. Noreene Shouts is no focal infiltrate, mass or nodule. 3. There is no pleural effusion or pericardial effusion. 4. Moderate calcified plaque seen within the LAD.        ECHO 8/22/21   Tachycardia noted. Normal left ventricular size. LV systolic function is hyperdynamic. Ejection fraction is visually estimated at 75-80%. There is near obliteration of the LV cavity during systole. There appears to be an intracavitary gradient approximately 20 mmHg. Indeterminate diastolic function. No regional wall motion abnormalities seen. Normal left ventricular wall thickness. Normal right ventricular size and function. No significant valvular abnormalities. Signature      ----------------------------------------------------------------   Electronically signed by Viky Pederson MD(Interpreting   physician) on 08/22/2021 12:47 PM    Labs:  Lab Results   Component Value Date    WBC 9.1 08/21/2021    HGB 15.0 08/21/2021    HCT 45.2 08/21/2021    MCV 90.8 08/21/2021    MCH 30.1 08/21/2021    MCHC 33.2 08/21/2021    RDW 12.6 08/21/2021     08/21/2021    MPV 11.6 08/21/2021     Lab Results   Component Value Date     08/20/2021    K 4.5 08/20/2021     08/20/2021    CO2 25 08/20/2021    BUN 10 08/20/2021    CREATININE 0.6 08/20/2021    LABALBU 4.0 03/15/2021    CALCIUM 9.8 08/20/2021    GFRAA >60 08/20/2021    LABGLOM >60 08/20/2021     Lab Results   Component Value Date    PROTIME 11.4 08/30/2016    INR 1.1 08/30/2016     No results for input(s): PROBNP in the last 72 hours. No results for input(s): PROCAL in the last 72 hours. This SmartLink has not been configured with any valid records. Micro:  8/20 COVID negative  8/20 respiratory panel negative     Assessment:    1. Acute respiratory failure with hypoxia and hypercapnia  2. COPD with acute exacerbation. Prior PFTs 2019 GOLD stage I mild   3. Hx of Stage Ib adenocarcinoma left lung with VATS left upper lobectomy September 19, 2017 [facility]  4. History of breast cancer 2012 treated with resection and radiation to the right breast  5. Allergic rhinitis  6.  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 GERD  8. Thyroid disease      Plan:   · Monitor off oxygen, on RA  · Tessalon, Cepacol lozenges, flutter valve  · Continue NIV with BiPAP 12/6 PRN, not using. No need for NIV at dc. abgs improved  · Doxy completed  · DC Prednisone  · Aerosol bronchodilators with DuoNebs every 4 while awake, Pulmicort/Brovana BID  · Home nebulizer for dc  · GI/DVT-Lovenox 40 mg, Protonix 40 mg daily  · Message routed for follow up  · Okay to discharge to rehab facility from pulmonary standpoint when okay with others.       Electronically signed by Jossie Felder MD on 8/25/2021 at 2:10 PM

## 2021-08-30 ENCOUNTER — TELEPHONE (OUTPATIENT)
Dept: FAMILY MEDICINE CLINIC | Age: 81
End: 2021-08-30

## 2021-08-30 DIAGNOSIS — R53.81 PHYSICAL DECONDITIONING: ICD-10-CM

## 2021-08-30 DIAGNOSIS — J44.1 CHRONIC OBSTRUCTIVE PULMONARY DISEASE WITH ACUTE EXACERBATION (HCC): Primary | ICD-10-CM

## 2021-08-30 DIAGNOSIS — R29.898 DECONDITIONED LOW BACK: ICD-10-CM

## 2021-08-30 NOTE — TELEPHONE ENCOUNTER
Daughter called requesting an RX for a Chair Stair lift-If they have an RX they can get it tax exempt.

## 2021-09-03 NOTE — DISCHARGE SUMMARY
Physician Discharge Summary     Patient ID:  Desire Murillo  [id number]  8 y.o.  1940    Admit date: 8/20/2021    Discharge date and time: 8/25/2021  6:21 PM     Admitting Physician: Chilo Rojas MD     Discharge Physician: Chilo Rojas MD    Consults: pulmonary/intensive care    Admission Diagnoses:   Acute exacerbation of chronic obstructive pulmonary disease (COPD) ([facility] [address] [J44.1]  COPD exacerbation ([facility] [address] [J44.1]    Discharge Diagnoses:     Patient Active Problem List   Diagnosis    History of colon polyps    Family history of colon cancer    Personal history of breast cancer    Hypothyroidism    Glaucoma    Hyperlipidemia    Disease of lung    Nodule of left lung    Adenocarcinoma, lung, left ([facility] [address]    Raynaud's phenomenon without gangrene    Venous insufficiency of both lower extremities    Acute exacerbation of chronic obstructive pulmonary disease (COPD) [facility])       Hospital Course: History of Present Illness:    Desire Murillo is a 78 y.o. female with hx of stage  IB adenocarcinoma lung cancer,  R upper lobectomy, breast cancer, hyperthyroid, and  COPD. She began with cough and sob earlier this week , was placed emperically on doxy and did not improv so presented to the ER    she was using accessory muscles, placed on oxygen 3 L nasal cannula for comfort.  BP was significantly elevated 204/104.  No fevers documented.  Chest x-ray with no acute process.  CTA chest with no evidence of PE     She states in ER she did not feel well until Bipap was placed.     Prior PFTs 2019 GOLD stage I mild   Seen by pulmonary and placed on solu medrol, duoneb pulmicort/brovana. Was on bipap for several days then was weaned to 6L  bp was elevated and started on amlodipine  She was weak and deconditioned and quaified for george.     Discharged Condition: good    Significant Diagnostic Studies: labs: , microbiology:  and radiology: CXR: . and CT scan:     Treatments: IV hydration, cardiac meds: amlodipine, steroids: solu-medrol and respiratory therapy: O2, HHFM, albuterol/atropine nebulizer, chest PT and bipap    Disposition: [facility]    Patient Instructions:     Discharge Medication List as of 8/25/2021  3:55 PM      START taking these medications    Details   ALPRAZolam (XANAX) 0.5 MG tablet Take 1 tablet by mouth daily as needed for Anxiety for up to 30 days. , Disp-30 tablet, R-0Normal      amLODIPine (NORVASC) 5 MG tablet Take 1 tablet by mouth daily, Disp-30 tablet, R-3NO PRINT      predniSONE (DELTASONE) 20 MG tablet Take 1 tablet by mouth daily for 5 doses, Disp-5 tablet, R-0Normal      benzonatate (TESSALON) 100 MG capsule Take 1 capsule by mouth 3 times daily for 7 days, Disp-21 capsule, R-0Normal      guaiFENesin 400 MG tablet Take 1 tablet by mouth 3 times daily, Disp-56 tablet, R-0Normal         CONTINUE these medications which have NOT CHANGED    Details   fluticasone-umeclidin-vilant (TRELEGY ELLIPTA) 100-62.5-25 MCG/INH AEPB Inhale 1 puff into the lungs daily, Disp-1 each, R-5Normal      simvastatin (ZOCOR) 40 MG tablet Take 1 tablet by mouth nightly, Disp-90 tablet, R-1Normal      levothyroxine (SYNTHROID) 137 MCG tablet Take 1 tablet by mouth daily, Disp-90 tablet, R-1Normal      albuterol sulfate HFA (PROAIR HFA) 108 (90 Base) MCG/ACT inhaler Inhale 2 puffs into the lungs every 6 hours as needed for Wheezing, Disp-1 Inhaler, R-6Normal      dorzolamide (TRUSOPT) 2 % ophthalmic solution INSTILL 1 DROP INTO EACH EYE 3 TIMES A DAYHistorical Med      albuterol (PROVENTIL) (2.5 MG/3ML) 0.083% nebulizer solution Take 3 mLs by nebulization every 6 hours as needed for Wheezing, Disp-120 each,R-3Normal      triamcinolone (NASACORT ALLERGY 24HR) 55 MCG/ACT nasal inhaler 2 sprays by Each Nostril route dailyHistorical Med      omeprazole (PRILOSEC) 20 MG delayed release capsule Take 40 mg by mouth daily Historical Med      latanoprost (XALATAN) 0.005 % ophthalmic solution Place 1 drop into the right eye nightly Historical Med         STOP taking these medications       doxycycline hyclate (VIBRA-TABS) 100 MG tablet Comments:   Reason for Stopping:         Handicap Placard MISC Comments:   Reason for Stopping:         Handicap Placard MISC Comments:   Reason for Stopping:         Elastic Bandages & Supports (JOBST KNEE HIGH COMPRESSION SM) MISC Comments:   Reason for Stopping:             Activity: activity as tolerated  Diet: regular diet  Wound Care: none needed    Follow-up with me after rehaab in    Signed:  Naya Cantu MD  9/2/2021  9:14 PM

## 2021-09-29 DIAGNOSIS — E03.9 ACQUIRED HYPOTHYROIDISM: Primary | ICD-10-CM

## 2021-09-29 RX ORDER — LEVOTHYROXINE SODIUM 0.12 MG/1
125 TABLET ORAL DAILY
Qty: 90 TABLET | Refills: 1 | Status: SHIPPED
Start: 2021-09-29 | End: 2022-03-24 | Stop reason: SDUPTHER

## 2021-10-06 ENCOUNTER — OFFICE VISIT (OUTPATIENT)
Dept: FAMILY MEDICINE CLINIC | Age: 81
End: 2021-10-06
Payer: MEDICARE

## 2021-10-06 VITALS
DIASTOLIC BLOOD PRESSURE: 80 MMHG | OXYGEN SATURATION: 95 % | TEMPERATURE: 97.7 F | BODY MASS INDEX: 28.71 KG/M2 | RESPIRATION RATE: 16 BRPM | HEART RATE: 88 BPM | SYSTOLIC BLOOD PRESSURE: 140 MMHG | HEIGHT: 64 IN | WEIGHT: 168.2 LBS

## 2021-10-06 DIAGNOSIS — Z23 FLU VACCINE NEED: ICD-10-CM

## 2021-10-06 DIAGNOSIS — E03.9 ACQUIRED HYPOTHYROIDISM: ICD-10-CM

## 2021-10-06 DIAGNOSIS — C34.92 ADENOCARCINOMA, LUNG, LEFT (HCC): ICD-10-CM

## 2021-10-06 DIAGNOSIS — J44.1 ACUTE EXACERBATION OF CHRONIC OBSTRUCTIVE PULMONARY DISEASE (COPD) (HCC): ICD-10-CM

## 2021-10-06 DIAGNOSIS — Z00.00 ROUTINE GENERAL MEDICAL EXAMINATION AT A HEALTH CARE FACILITY: Primary | ICD-10-CM

## 2021-10-06 PROCEDURE — 90694 VACC AIIV4 NO PRSRV 0.5ML IM: CPT | Performed by: FAMILY MEDICINE

## 2021-10-06 PROCEDURE — G0439 PPPS, SUBSEQ VISIT: HCPCS | Performed by: FAMILY MEDICINE

## 2021-10-06 PROCEDURE — G0008 ADMIN INFLUENZA VIRUS VAC: HCPCS | Performed by: FAMILY MEDICINE

## 2021-10-06 ASSESSMENT — PATIENT HEALTH QUESTIONNAIRE - PHQ9
2. FEELING DOWN, DEPRESSED OR HOPELESS: 0
SUM OF ALL RESPONSES TO PHQ QUESTIONS 1-9: 0
SUM OF ALL RESPONSES TO PHQ9 QUESTIONS 1 & 2: 0
1. LITTLE INTEREST OR PLEASURE IN DOING THINGS: 0
SUM OF ALL RESPONSES TO PHQ QUESTIONS 1-9: 0
SUM OF ALL RESPONSES TO PHQ QUESTIONS 1-9: 0

## 2021-10-06 ASSESSMENT — LIFESTYLE VARIABLES: HOW OFTEN DO YOU HAVE A DRINK CONTAINING ALCOHOL: 0

## 2021-10-06 NOTE — PROGRESS NOTES
Medicare Annual Wellness Visit  Name: Kevan Shine Date: 10/7/2021   MRN: [id number] Sex: Female   Age: 78 y.o. Ethnicity: Non- / Non    : 1940 Race: White (non-)      Ed Gil is here for Medicare AWV    Screenings for behavioral, psychosocial and functional/safety risks, and cognitive dysfunction are all negative except as indicated below. These results, as well as other patient data from the [address] form, are documented in Flowsheets linked to this Encounter. Allergies   Allergen Reactions    Beta Adrenergic Blockers Shortness Of Breath     Severe wheezing with timolol    Timolol Maleate Shortness Of Breath    Aspirin      bruising    Pcn [Penicillins] Hives         Prior to Visit Medications    Medication Sig Taking?  Authorizing Provider   levothyroxine (SYNTHROID) 125 MCG tablet Take 1 tablet by mouth daily Yes Dayo Esqueda MD   fluticasone-umeclidin-vilant (TRELEGY ELLIPTA) 100-62.5-25 MCG/INH AEPB Inhale 1 puff into the lungs daily Yes Dayo Esqueda MD   simvastatin (ZOCOR) 40 MG tablet Take 1 tablet by mouth nightly Yes Dayo Esqueda MD   dorzolamide (TRUSOPT) 2 % ophthalmic solution INSTILL 1 DROP INTO EACH EYE 3 TIMES A DAY Yes Historical Provider, MD   triamcinolone (NASACORT ALLERGY 24HR) 55 MCG/ACT nasal inhaler 2 sprays by Each Nostril route daily Yes Historical Provider, MD   omeprazole (PRILOSEC) 20 MG delayed release capsule Take 40 mg by mouth daily  Yes Historical Provider, MD   latanoprost (XALATAN) 0.005 % ophthalmic solution Place 1 drop into the right eye nightly  Yes Historical Provider, MD   albuterol sulfate HFA (PROAIR HFA) 108 (90 Base) MCG/ACT inhaler Inhale 2 puffs into the lungs every 6 hours as needed for Wheezing  Patient not taking: Reported on 10/6/2021  Dayo Esqueda MD   albuterol (PROVENTIL) (2.5 MG/3ML) 0.083% nebulizer solution Take 3 mLs by nebulization every 6 hours as needed for Wheezing  Patient not taking: Reported on 10/6/2021  Nahid Roberts MD         Past Medical History:   Diagnosis Date    Anesthesia     wakes up very slowly and has lasting drowsiness    Breast cancer ([facility] [address]     breast    COPD with exacerbation ([facility] [address] 11/14/2015    Glaucoma     History of colon polyps     Hyperlipidemia     Hypothyroidism     Osteoarthritis     Pneumonia        Past Surgical History:   Procedure Laterality Date    APPENDECTOMY      BREAST LUMPECTOMY  9/25/2012    Right    BREAST SURGERY      left breast > benign 40 + years ago    COLONOSCOPY  8/21/2013    COLONOSCOPY  [id number]    EYE SURGERY      HYSTERECTOMY      CARMELITA    OTHER SURGICAL HISTORY  11/1/12    r needle localized axillary sentinel lymph node exision         Family History   Problem Relation Age of Onset    Cancer Sister         pancreatic    Cancer Brother         prostate    Cancer Brother         pancreatic    Cancer Sister 68        breast       CareTeam (Including outside providers/suppliers regularly involved in providing care):   Patient Care Team:  Nahid Roberts MD as PCP - General (Family Medicine)  Nahid Roberts MD as PCP - [facility] Empaneled Provider  Jonathon Serrano MD as Consulting Physician (Hematology and Oncology)    Wt Readings from Last 3 Encounters:   10/06/21 168 lb 3.2 oz (76.3 kg)   09/20/21 169 lb 6.4 oz (76.8 kg)   08/25/21 174 lb (78.9 kg)     Vitals:    10/06/21 0841   BP: (!) 140/80   Pulse: 88   Resp: 16   Temp: 97.7 °F (36.5 °C)   SpO2: 95%   Weight: 168 lb 3.2 oz (76.3 kg)   Height: 5' 4\" (1.626 m)     Body mass index is 28.87 kg/m². Based upon direct observation of the patient, evaluation of cognition reveals recent and remote memory intact.     General Appearance: alert and oriented to person, place and time, well developed and well- nourished, in no acute distress  Skin: warm and dry, no rash or erythema  Head: normocephalic and atraumatic  Neck: supple and non-tender without mass, no thyromegaly or thyroid nodules, no cervical lymphadenopathy  Pulmonary/Chest: clear to auscultation bilaterally- no wheezes, rales or rhonchi, normal air movement, no respiratory distress  Cardiovascular: normal rate, regular rhythm, normal S1 and S2, no murmurs, rubs, clicks, or gallops, distal pulses intact, no carotid bruits  Abdomen: soft, non-tender, non-distended, normal bowel sounds, no masses or organomegaly  Extremities: no cyanosis, clubbing or edema  Musculoskeletal: normal range of motion, no joint swelling, deformity or tenderness  Neurologic: reflexes normal and symmetric, no cranial nerve deficit, gait, coordination and speech normal    Patient's complete Health Risk Assessment and screening values have been reviewed and are found in Flowsheets. The following problems were reviewed today and where indicated follow up appointments were made and/or referrals ordered.     Positive Risk Factor Screenings with Interventions:            Hearing/Vision:  No exam data present  Hearing/Vision  Do you or your family notice any trouble with your hearing that hasn't been managed with hearing aids?: (!) Yes (sometimes, but does not wear hearing aids)  Do you have difficulty driving, watching TV, or doing any of your daily activities because of your eyesight?: No  Have you had an eye exam within the past year?: Yes  Hearing/Vision Interventions:  · Hearing concerns:  patient declines any further evaluation/treatment for hearing issues      Personalized Preventive Plan   Current Health Maintenance Status  Immunization History   Administered Date(s) Administered    LANA-19, [place], PF, 30mcg/0.3mL 01/25/2021, 02/15/2021, 09/27/2021    Influenza A (G2T4-86) Vaccine PF IM 01/15/2010    Influenza Virus Vaccine 09/28/2016    Influenza, High Dose (Fluzone 65 yrs and older) 10/03/2014, 10/07/2015, 10/04/2016, 09/11/2017, 09/24/2018    Influenza, Quadv, adjuvanted, 65 yrs +, IM, PF (Fluad) 09/29/2020, 10/06/2021    Influenza, Triv, inactivated, subunit, adjuvanted, IM (Fluad 65 yrs and older) 09/27/2019    Pneumococcal Conjugate 13-valent ([id number]) 10/07/2015    Pneumococcal Polysaccharide ([id number]) 02/19/2007, 10/08/2007    Zoster Recombinant (Shingrix) 02/19/2019, 05/13/2019        Health Maintenance   Topic Date Due    DTaP/Tdap/Td vaccine (1 - Tdap) Never done    Annual Wellness Visit (AWV)  09/30/2021    Lipid screen  09/29/2022    TSH testing  09/29/2022    DEXA (modify frequency per FRAX score)  Completed    Flu vaccine  Completed    Shingles Vaccine  Completed    Pneumococcal 65+ years Vaccine  Completed    COVID-19 Vaccine  Completed    Hepatitis A vaccine  Aged Out    Hepatitis B vaccine  Aged Out    Hib vaccine  Aged Out    Meningococcal (ACWY) vaccine  Aged Out     Recommendations for dotHIV Due: see orders and patient instructions/AVS.  . Recommended screening schedule for the next 5-10 years is provided to the patient in written form: see Patient Instructions/AVS.    Presently now living with daughter, son in law and grandchildren  She is very happy to be there  breathig is good  On treligy, nasacort   proair  Akhil Maxwell was seen today for medicare awv.     Diagnoses and all orders for this visit:    Routine general medical examination at a health care facility    Acquired hypothyroidism    Adenocarcinoma, lung, left ([address]    Acute exacerbation of chronic obstructive pulmonary disease (COPD) ([address]    Flu vaccine need  -     INFLUENZA, QUADV, ADJUVANTED, 65 YRS =, IM, PF, PREFILL SYR, 0.5ML (FLUAD)

## 2021-10-06 NOTE — PATIENT INSTRUCTIONS
Personalized Preventive Plan for Ania Ojeda - 10/6/2021  Medicare offers a range of preventive health benefits. Some of the tests and screenings are paid in full while other may be subject to a deductible, co-insurance, and/or copay. Some of these benefits include a comprehensive review of your medical history including lifestyle, illnesses that may run in your family, and various assessments and screenings as appropriate. After reviewing your medical record and screening and assessments performed today your provider may have ordered immunizations, labs, imaging, and/or referrals for you. A list of these orders (if applicable) as well as your Preventive Care list are included within your After Visit Summary for your review. Other Preventive Recommendations:    · A preventive eye exam performed by an eye specialist is recommended every 1-2 years to screen for glaucoma; cataracts, macular degeneration, and other eye disorders. · A preventive dental visit is recommended every 6 months. · Try to get at least 150 minutes of exercise per week or 10,000 steps per day on a pedometer . · Order or download the FREE \"Exercise & Physical Activity: Your Everyday Guide\" from [employer]. Call 8-448.449.6060 or search [employer] online. · You need 5370-5364 mg of calcium and 2057-3818 IU of vitamin D per day. It is possible to meet your calcium requirement with diet alone, but a vitamin D supplement is usually necessary to meet this goal.  · When exposed to the sun, use a sunscreen that protects against both UVA and UVB radiation with an SPF of 30 or greater. Reapply every 2 to 3 hours or after sweating, drying off with a towel, or swimming. · Always wear a seat belt when traveling in a car. Always wear a helmet when riding a bicycle or motorcycle.

## 2021-10-12 DIAGNOSIS — E78.2 MIXED HYPERLIPIDEMIA: ICD-10-CM

## 2021-10-12 RX ORDER — SIMVASTATIN 40 MG
40 TABLET ORAL NIGHTLY
Qty: 90 TABLET | Refills: 1 | Status: SHIPPED
Start: 2021-10-12 | End: 2022-04-06 | Stop reason: SDUPTHER

## 2021-10-12 RX ORDER — SIMVASTATIN 40 MG
40 TABLET ORAL NIGHTLY
Qty: 90 TABLET | Refills: 1 | Status: SHIPPED
Start: 2021-10-12 | End: 2021-10-12 | Stop reason: SDUPTHER

## 2021-10-12 NOTE — TELEPHONE ENCOUNTER
Refill Simvastatin [facility]    Medication Refill Request    LOV 10/6/2021  NOV 4/6/2022    Lab Results   Component Value Date    CREATININE 0.5 09/29/2021

## 2021-11-30 NOTE — TELEPHONE ENCOUNTER
Patient on Trelegy and is in the donut hole and it cost $150 a month. Needs written Rx for a 3 month supply (can not be electronic) so that she can get medication for free. In the mean time do we have a sample to give her? We do not have samples.

## 2021-12-03 DIAGNOSIS — J20.9 ACUTE BRONCHITIS, UNSPECIFIED ORGANISM: ICD-10-CM

## 2021-12-03 DIAGNOSIS — R06.02 SOB (SHORTNESS OF BREATH): ICD-10-CM

## 2021-12-03 NOTE — TELEPHONE ENCOUNTER
----- Message from Gisselle Matias sent at 12/3/2021  4:04 PM EST -----  Subject: Message to Provider    QUESTIONS  Information for Provider? needs a hand written prescription for   fluticasone-umeclidin-vilant (Julia Early) 100-62.5-25 MCG/INH AEPB,   said she can come pick it up  ---------------------------------------------------------------------------  --------------  [address]  What is the best way for the office to contact you? OK to leave message on   voicemail  Preferred Call Back Phone Number?  [phone]  ---------------------------------------------------------------------------  --------------  SCRIPT ANSWERS  undefined

## 2021-12-06 NOTE — TELEPHONE ENCOUNTER
Rx written and out front for -Patient advised we do NOT have samples-suggested she call the Specialist.

## 2021-12-09 DIAGNOSIS — E03.9 ACQUIRED HYPOTHYROIDISM: ICD-10-CM

## 2021-12-09 LAB — TSH SERPL DL<=0.05 MIU/L-ACNC: 0.21 UIU/ML (ref 0.27–4.2)

## 2022-03-24 DIAGNOSIS — E03.9 ACQUIRED HYPOTHYROIDISM: ICD-10-CM

## 2022-03-24 DIAGNOSIS — E78.2 MIXED HYPERLIPIDEMIA: Primary | ICD-10-CM

## 2022-03-24 DIAGNOSIS — E78.2 MIXED HYPERLIPIDEMIA: ICD-10-CM

## 2022-03-24 LAB
ALBUMIN SERPL-MCNC: 4.3 G/DL (ref 3.5–5.2)
ALP BLD-CCNC: 97 U/L (ref 35–104)
ALT SERPL-CCNC: 27 U/L (ref 0–32)
ANION GAP SERPL CALCULATED.3IONS-SCNC: 9 MMOL/L (ref 7–16)
AST SERPL-CCNC: 17 U/L (ref 0–31)
BASOPHILS ABSOLUTE: 0.06 E9/L (ref 0–0.2)
BASOPHILS RELATIVE PERCENT: 1.1 % (ref 0–2)
BILIRUB SERPL-MCNC: 0.2 MG/DL (ref 0–1.2)
BUN BLDV-MCNC: 16 MG/DL (ref 6–23)
CALCIUM SERPL-MCNC: 10.1 MG/DL (ref 8.6–10.2)
CHLORIDE BLD-SCNC: 103 MMOL/L (ref 98–107)
CHOLESTEROL, TOTAL: 168 MG/DL (ref 0–199)
CO2: 27 MMOL/L (ref 22–29)
CREAT SERPL-MCNC: 0.5 MG/DL (ref 0.5–1)
EOSINOPHILS ABSOLUTE: 0.17 E9/L (ref 0.05–0.5)
EOSINOPHILS RELATIVE PERCENT: 3 % (ref 0–6)
GFR AFRICAN AMERICAN: >60
GFR NON-AFRICAN AMERICAN: >60 ML/MIN/1.73
GLUCOSE BLD-MCNC: 151 MG/DL (ref 74–99)
HCT VFR BLD CALC: 42.4 % (ref 34–48)
HDLC SERPL-MCNC: 54 MG/DL
HEMOGLOBIN: 13.3 G/DL (ref 11.5–15.5)
IMMATURE GRANULOCYTES #: 0.02 E9/L
IMMATURE GRANULOCYTES %: 0.4 % (ref 0–5)
LDL CHOLESTEROL CALCULATED: 87 MG/DL (ref 0–99)
LYMPHOCYTES ABSOLUTE: 1.68 E9/L (ref 1.5–4)
LYMPHOCYTES RELATIVE PERCENT: 29.5 % (ref 20–42)
MCH RBC QN AUTO: 30.2 PG (ref 26–35)
MCHC RBC AUTO-ENTMCNC: 31.4 % (ref 32–34.5)
MCV RBC AUTO: 96.1 FL (ref 80–99.9)
MONOCYTES ABSOLUTE: 0.61 E9/L (ref 0.1–0.95)
MONOCYTES RELATIVE PERCENT: 10.7 % (ref 2–12)
NEUTROPHILS ABSOLUTE: 3.15 E9/L (ref 1.8–7.3)
NEUTROPHILS RELATIVE PERCENT: 55.3 % (ref 43–80)
PDW BLD-RTO: 12.9 FL (ref 11.5–15)
PLATELET # BLD: 212 E9/L (ref 130–450)
PMV BLD AUTO: 11.7 FL (ref 7–12)
POTASSIUM SERPL-SCNC: 5.4 MMOL/L (ref 3.5–5)
RBC # BLD: 4.41 E12/L (ref 3.5–5.5)
SODIUM BLD-SCNC: 139 MMOL/L (ref 132–146)
TOTAL PROTEIN: 6.8 G/DL (ref 6.4–8.3)
TRIGL SERPL-MCNC: 135 MG/DL (ref 0–149)
TSH SERPL DL<=0.05 MIU/L-ACNC: 0.49 UIU/ML (ref 0.27–4.2)
VLDLC SERPL CALC-MCNC: 27 MG/DL
WBC # BLD: 5.7 E9/L (ref 4.5–11.5)

## 2022-03-24 RX ORDER — LEVOTHYROXINE SODIUM 0.12 MG/1
125 TABLET ORAL DAILY
Qty: 90 TABLET | Refills: 1 | Status: SHIPPED
Start: 2022-03-24 | End: 2022-09-23 | Stop reason: SDUPTHER

## 2022-03-24 NOTE — TELEPHONE ENCOUNTER
Medication Refill Request    LOV 10/6/2021  NOV 4/6/2022    Lab Results   Component Value Date    CREATININE 0.5 03/24/2022

## 2022-04-06 ENCOUNTER — OFFICE VISIT (OUTPATIENT)
Dept: FAMILY MEDICINE CLINIC | Age: 82
End: 2022-04-06
Payer: MEDICARE

## 2022-04-06 VITALS
BODY MASS INDEX: 30.35 KG/M2 | WEIGHT: 176.8 LBS | DIASTOLIC BLOOD PRESSURE: 62 MMHG | OXYGEN SATURATION: 96 % | SYSTOLIC BLOOD PRESSURE: 134 MMHG | HEART RATE: 102 BPM | RESPIRATION RATE: 18 BRPM | TEMPERATURE: 97.3 F

## 2022-04-06 DIAGNOSIS — Z86.010 HISTORY OF COLON POLYPS: ICD-10-CM

## 2022-04-06 DIAGNOSIS — C34.92 ADENOCARCINOMA, LUNG, LEFT (HCC): Primary | ICD-10-CM

## 2022-04-06 DIAGNOSIS — E78.2 MIXED HYPERLIPIDEMIA: ICD-10-CM

## 2022-04-06 DIAGNOSIS — Z85.3 PERSONAL HISTORY OF BREAST CANCER: ICD-10-CM

## 2022-04-06 DIAGNOSIS — Z12.31 OTHER SCREENING MAMMOGRAM: ICD-10-CM

## 2022-04-06 DIAGNOSIS — J43.8 OTHER EMPHYSEMA (HCC): ICD-10-CM

## 2022-04-06 DIAGNOSIS — E03.9 ACQUIRED HYPOTHYROIDISM: ICD-10-CM

## 2022-04-06 PROBLEM — J44.1 ACUTE EXACERBATION OF CHRONIC OBSTRUCTIVE PULMONARY DISEASE (COPD) (HCC): Status: RESOLVED | Noted: 2021-08-20 | Resolved: 2022-04-06

## 2022-04-06 PROCEDURE — 99214 OFFICE O/P EST MOD 30 MIN: CPT | Performed by: FAMILY MEDICINE

## 2022-04-06 RX ORDER — SIMVASTATIN 40 MG
40 TABLET ORAL NIGHTLY
Qty: 90 TABLET | Refills: 1 | Status: SHIPPED
Start: 2022-04-06 | End: 2022-10-06 | Stop reason: SDUPTHER

## 2022-04-06 SDOH — ECONOMIC STABILITY: TRANSPORTATION INSECURITY
IN THE PAST 12 MONTHS, HAS THE LACK OF TRANSPORTATION KEPT YOU FROM MEDICAL APPOINTMENTS OR FROM GETTING MEDICATIONS?: NO

## 2022-04-06 SDOH — ECONOMIC STABILITY: FOOD INSECURITY: WITHIN THE PAST 12 MONTHS, THE FOOD YOU BOUGHT JUST DIDN'T LAST AND YOU DIDN'T HAVE MONEY TO GET MORE.: NEVER TRUE

## 2022-04-06 SDOH — ECONOMIC STABILITY: TRANSPORTATION INSECURITY
IN THE PAST 12 MONTHS, HAS LACK OF TRANSPORTATION KEPT YOU FROM MEETINGS, WORK, OR FROM GETTING THINGS NEEDED FOR DAILY LIVING?: NO

## 2022-04-06 SDOH — ECONOMIC STABILITY: HOUSING INSECURITY: IN THE LAST 12 MONTHS, HOW MANY PLACES HAVE YOU LIVED?: 2

## 2022-04-06 SDOH — ECONOMIC STABILITY: INCOME INSECURITY: IN THE LAST 12 MONTHS, WAS THERE A TIME WHEN YOU WERE NOT ABLE TO PAY THE MORTGAGE OR RENT ON TIME?: NO

## 2022-04-06 SDOH — ECONOMIC STABILITY: HOUSING INSECURITY
IN THE LAST 12 MONTHS, WAS THERE A TIME WHEN YOU DID NOT HAVE A STEADY PLACE TO SLEEP OR SLEPT IN A SHELTER (INCLUDING NOW)?: NO

## 2022-04-06 SDOH — ECONOMIC STABILITY: FOOD INSECURITY: WITHIN THE PAST 12 MONTHS, YOU WORRIED THAT YOUR FOOD WOULD RUN OUT BEFORE YOU GOT MONEY TO BUY MORE.: NEVER TRUE

## 2022-04-06 ASSESSMENT — SOCIAL DETERMINANTS OF HEALTH (SDOH): HOW HARD IS IT FOR YOU TO PAY FOR THE VERY BASICS LIKE FOOD, HOUSING, MEDICAL CARE, AND HEATING?: NOT HARD AT ALL

## 2022-04-06 NOTE — PROGRESS NOTES
2022    Current Outpatient Medications   Medication Sig Dispense Refill    simvastatin (ZOCOR) 40 MG tablet Take 1 tablet by mouth nightly 90 tablet 1    fluticasone-umeclidin-vilant (TRELEGY ELLIPTA) 100-62.5-25 MCG/INH AEPB Inhale 1 puff into the lungs daily 180 each 3    levothyroxine (SYNTHROID) 125 MCG tablet Take 1 tablet by mouth daily 90 tablet 1    albuterol sulfate HFA (PROAIR HFA) 108 (90 Base) MCG/ACT inhaler Inhale 2 puffs into the lungs every 6 hours as needed for Wheezing 1 Inhaler 6    dorzolamide (TRUSOPT) 2 % ophthalmic solution INSTILL 1 DROP INTO EACH EYE 3 TIMES A DAY      albuterol (PROVENTIL) (2.5 MG/3ML) 0.083% nebulizer solution Take 3 mLs by nebulization every 6 hours as needed for Wheezing 120 each 3    omeprazole (PRILOSEC) 20 MG delayed release capsule Take 40 mg by mouth daily       latanoprost (XALATAN) 0.005 % ophthalmic solution Place 1 drop into the right eye nightly       triamcinolone (NASACORT ALLERGY 24HR) 55 MCG/ACT nasal inhaler 2 sprays by Each Nostril route daily (Patient not taking: Reported on 2022)       No current facility-administered medications for this visit. Kemar Stringer (: 1940 IS A 78 y.o. female ,Established patient, here for eval of Thyroid Problem (6 month medication check up) and Hyperlipidemia    Lives with daughter and doing well  Feels fine  Loves being with her grandchildren  Breathing is good has not bee      ASSESSMENT / PLAN      1. Mixed hyperlipidemia  22 0754 CHOL 168 -- Final result   22 0754 TRIG 135 -- Final result   22 0754 HDL 54 -- Final result   22 0754 LDLCALC 87 -- Final        - simvastatin (ZOCOR) 40 MG tablet; Take 1 tablet by mouth nightly  Dispense: 90 tablet; Refill: 1  - CBC with Auto Differential; Future  - Comprehensive Metabolic Panel; Future  - Lipid Panel; Future    2.  Adenocarcinoma, lung, left (HCC)Lung CA  * Hx of Stage Ib adenocarcinoma left lung with VATS left upper lobectomy September 19, 2017 at [facility]. Stable, cont meds recheck 6 mos    Was getting regular CTs screenings. She had an abnormal 1 with groundglass opacity nodules 5/2021. Essentially normal on repeat CT 8/2021.    3. Acquired hypothyroidism  Lab Results   Component Value Date    TSH 0.491 03/24/2022     Stable, cont meds recheck 6 mos    - TSH; Future    4. 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 The patient has the following   family history of breast cancer-  sister, at age 68. The patient has a   history of right ultrasound core biopsy in September, 2012 - dcis,   right Lumpectomy in 2012 - dcis and left Excisional Biopsy more than 10   years ago - benign. 5. History of colon polyps  No further bleeding or bowel issues*    6. Other emphysema (HCC)COPDFrom last hospitalization was on a BiPAP. Reports still having a device at home BiPAP 12/6 and wearing on a nightly basis. Wears on nightly basis. Feels better with use. Has more energy and less fatigue. *PFTs have varied from moderate to mild obstruction. PFT 2019 with mild stage I. Hx nicotine dependence 22.5 pk yrs in remission      7. Other screening mammogram    - [facility] DIGITAL SCREEN BILATERAL PER PROTOCOL; Future      SUBJECTIVE    Review of Systems   Constitutional: Negative for activity change, appetite change, fatigue and unexpected weight change. HENT: Negative for congestion. Eyes: Negative. Negative for visual disturbance. Respiratory: Negative for cough, chest tightness, shortness of breath and wheezing. Cardiovascular: Negative for chest pain and palpitations. Gastrointestinal: Negative. Endocrine: Negative. Genitourinary: Negative. Musculoskeletal: Negative. Skin: Negative for pallor, rash and wound. Allergic/Immunologic: Negative. Neurological: Negative. Negative for syncope. Hematological: Negative. Psychiatric/Behavioral: Negative. OBJECTIVE    Wt Readings from Last 3 Encounters:   04/06/22 176 lb 12.8 oz (80.2 kg)   12/27/21 171 lb 12.8 oz (77.9 kg)   10/06/21 168 lb 3.2 oz (76.3 kg)       Vitals:    04/06/22 0852   BP: 134/62   Pulse: 102   Resp: 18   Temp: 97.3 °F (36.3 °C)   SpO2: 96%       Physical Exam  Vitals and nursing note reviewed. Constitutional:       Appearance: Normal appearance. Cardiovascular:      Rate and Rhythm: Normal rate and regular rhythm. Pulses: Normal pulses. Heart sounds: Normal heart sounds. Pulmonary:      Effort: Pulmonary effort is normal. No respiratory distress. Breath sounds: Normal breath sounds. Abdominal:      General: Bowel sounds are normal. There is no distension. Palpations: Abdomen is soft. Tenderness: There is no abdominal tenderness. Musculoskeletal:         General: No swelling or tenderness. Cervical back: Normal range of motion. No rigidity. Skin:     General: Skin is warm and dry. Neurological:      General: No focal deficit present. Mental Status: She is alert. Psychiatric:         Mood and Affect: Mood normal.         Behavior: Behavior normal.         Thought Content: Thought content normal.         Judgment: Judgment normal.           Return in about 6 months (around 10/6/2022). An electronic signature was used to authenticate this note.     Mily Bey, YELENA    4/11/2022

## 2022-04-11 ASSESSMENT — ENCOUNTER SYMPTOMS
CHEST TIGHTNESS: 0
COUGH: 0
GASTROINTESTINAL NEGATIVE: 1
EYES NEGATIVE: 1
ALLERGIC/IMMUNOLOGIC NEGATIVE: 1
SHORTNESS OF BREATH: 0
WHEEZING: 0

## 2022-04-12 ENCOUNTER — TELEPHONE (OUTPATIENT)
Dept: FAMILY MEDICINE CLINIC | Age: 82
End: 2022-04-12

## 2022-04-12 RX ORDER — OSELTAMIVIR PHOSPHATE 75 MG/1
75 CAPSULE ORAL 2 TIMES DAILY
Qty: 10 CAPSULE | Refills: 0 | Status: SHIPPED | OUTPATIENT
Start: 2022-04-12 | End: 2022-04-17

## 2022-04-12 NOTE — TELEPHONE ENCOUNTER
Pts daughter, Unknown Kehr, called in because pt is experiencing extreme fatigue, headache, and a fever between 100.8-101. She states pt was exposed to type A flu. Pt is currently in [place], and too far to come into the office. There is a local urgent care, but pt does not want to leave the house due to her fatigue. Unknown Kehr asked if tamiflu is an option. Unknown Kehr can be contacted at [phone]. Is a script is written, please send it to [facility], pharmacy on file. Routing message to clinical [place] and Dr. Stalin Delarosa.     Electronically signed by Tk Stevens CMA on 4/12/22 at 8:25 AM EDT

## 2022-05-04 ENCOUNTER — TELEPHONE (OUTPATIENT)
Dept: FAMILY MEDICINE CLINIC | Age: 82
End: 2022-05-04

## 2022-05-04 NOTE — TELEPHONE ENCOUNTER
Pt had the flu April 11-14,  Would like to get second Covid booster, wanted to know if there is a waiting period since she just got over having the flu or can she go anytime to receive it?     Please advise

## 2022-05-19 ENCOUNTER — HOSPITAL ENCOUNTER (OUTPATIENT)
Dept: GENERAL RADIOLOGY | Age: 82
Discharge: HOME | End: 2022-05-21
Payer: MEDICARE

## 2022-05-19 VITALS — WEIGHT: 174 LBS | HEIGHT: 63 IN | BODY MASS INDEX: 30.83 KG/M2

## 2022-05-19 DIAGNOSIS — Z12.31 OTHER SCREENING MAMMOGRAM: ICD-10-CM

## 2022-05-19 PROCEDURE — 77063 BREAST TOMOSYNTHESIS BI: CPT

## 2022-08-15 NOTE — PROGRESS NOTES
Internal Medicine Internal Medicine Occupational Therapy  OT BEDSIDE TREATMENT NOTE      Date:2021  Patient Name: Tomi Gomez  MRN: [id number]  : 1940  Room: [address] [place] [address]     Evaluating OT: BECKY Shepard/L - [id number]     Referring Provider: Baylee Cage DO  Specific Provider Orders/Date: \"OT eval and treat\" - 2021     Diagnosis: Acute exacerbation of chronic obstructive pulmonary disease (COPD) ([facility] [address] [J44.1], COPD exacerbation ([facility] [address] [J44.1]      Pertinent Medical History: COPD, breast cancer, glaucoma, lung cancer, OA      Precautions: fall risk,     Assessment of Current Deficits:    [x]? Functional mobility             [x]?ADLs           [x]? Strength                  [x]? Cognition   [x]? Functional transfers           [x]? IADLs         [x]? Safety Awareness   [x]? Endurance   []? Fine Coordination              [x]? Balance      []? Vision/perception   [x]? Sensation     []? Gross Motor Coordination  []? ROM           []? Delirium                   []? Motor Control      OT PLAN OF CARE   OT POC is based on physician orders, patient diagnosis, and results of clinical assessment.   Frequency/Duration 2-5 days/week for 2 weeks PRN   Specific OT Treatment Interventions to Include:   * Instruction/training on adapted ADL techniques and AE recommendations to increase functional independence within precautions       * Training on energy conservation strategies, correct breathing pattern and techniques to improve independence/tolerance for self-care routine  * Functional transfer/mobility training/DME recommendations for increased independence, safety, and fall prevention  * Patient/Family education to increase follow through with safety techniques and functional independence  * Recommendation of environmental modifications for increased safety with functional transfers/mobility and ADLs  * Therapeutic exercise to improve motor endurance, ROM, and functional strength for ADLs/functional transfers  * Therapeutic activities to Endocrinology Internal Medicine Internal Medicine facilitate/challenge dynamic balance, stand tolerance for increased safety and independence with ADLs  * Neuro-muscular re-education: facilitation of righting/equilibrium reactions, midline orientation, scapular stability/mobility, normalization of muscle tone, and facilitation of volitional active controled movement     Recommended Adaptive Equipment: BSC     Home Living: Patient lives alone in a one-floor home; laundry is on the main living level. Patient noted that she plans to stay with her daughter (who lives locally) upon discharge; patient's bedroom and full bathroom will be on the main living level of patient's daughter's home (walk-in shower (with seat available). Bathroom Setup: walk-in shower (with two usable built-in seats, grab bars, and handheld shower head) and elevated toilet - at patient's home  Equipment Owned: walker, cane     Prior Level of Function (PLOF): Patient was independent with ADLs, IADLs, and functional mobility prior to this hospitalization. Driving: Yes     Pain Level: Pt did not report pain during session. Cognition: Pt awake and alert. Min cues for safety awareness.      Functional Assessment:                  AM-PAC Daily Activity Raw Score: 17/24    Initial Eval Status  Date: 8/23/2021 Treatment Status  Date: 8/25/21 Short Term Goals = Long Term Goals   Feeding Setup Independent       Grooming Min A SBA for balance while standing at the sink.   Mod I  (seated/standing at sink)   UB Dressing Min A Setup   Setup   LB Dressing Mod A  Patient demonstrated difficulty with adjusting socks of B feet secondary to B LE edema and shortness of breath. Pt able to doff brief. Assist to don clean brief due to increased HR and nursing requesting pt return to bed.   SBA - with use of AE, as needed/appropriate   Bathing Mod A Setup UB bathe  Pt able to complete delmar hygiene while sponge bathing at the sink.   Unable to complete full bathing due to increased HR.   SBA - with use of AE/DME, as Internal Medicine Internal Medicine Internal Medicine conservation during self care skills. Therapeutic activity to address balance and endurance for ADL and transfers. Pt education of walker safety, transfer safety, and energy conservation. · Pt has made  progress towards set goals.        Time In: 8:10   Time Out: 8:40      Min Units   Therapeutic Ex 33660     Therapeutic Activities [id number] 7    ADL/Self Care 51245 23 2   Orthotic Management 59063     Neuro Re-Ed 61071     Non-Billable Time     TOTAL TIMED TREATMENT 30 [address] [facility] TONY/L 31538 Endocrinology Pulmonology Endocrinology Pulmonology Pulmonology Pulmonology

## 2022-09-23 DIAGNOSIS — E03.9 ACQUIRED HYPOTHYROIDISM: ICD-10-CM

## 2022-09-23 RX ORDER — LEVOTHYROXINE SODIUM 0.12 MG/1
125 TABLET ORAL DAILY
Qty: 90 TABLET | Refills: 1 | Status: SHIPPED | OUTPATIENT
Start: 2022-09-23

## 2022-09-23 NOTE — TELEPHONE ENCOUNTER
Medication Refill Request    LOV 4/6/2022  NOV 10/6/2022    Lab Results   Component Value Date    CREATININE 0.5 03/24/2022

## 2022-10-03 DIAGNOSIS — E78.2 MIXED HYPERLIPIDEMIA: ICD-10-CM

## 2022-10-03 DIAGNOSIS — E03.9 ACQUIRED HYPOTHYROIDISM: ICD-10-CM

## 2022-10-03 LAB
ALBUMIN SERPL-MCNC: 4.2 G/DL (ref 3.5–5.2)
ALP BLD-CCNC: 93 U/L (ref 35–104)
ALT SERPL-CCNC: 32 U/L (ref 0–32)
ANION GAP SERPL CALCULATED.3IONS-SCNC: 9 MMOL/L (ref 7–16)
AST SERPL-CCNC: 21 U/L (ref 0–31)
BASOPHILS ABSOLUTE: 0.07 E9/L (ref 0–0.2)
BASOPHILS RELATIVE PERCENT: 1.1 % (ref 0–2)
BILIRUB SERPL-MCNC: 0.3 MG/DL (ref 0–1.2)
BUN BLDV-MCNC: 13 MG/DL (ref 6–23)
CALCIUM SERPL-MCNC: 9.8 MG/DL (ref 8.6–10.2)
CHLORIDE BLD-SCNC: 106 MMOL/L (ref 98–107)
CHOLESTEROL, TOTAL: 178 MG/DL (ref 0–199)
CO2: 26 MMOL/L (ref 22–29)
CREAT SERPL-MCNC: 0.6 MG/DL (ref 0.5–1)
EOSINOPHILS ABSOLUTE: 0.2 E9/L (ref 0.05–0.5)
EOSINOPHILS RELATIVE PERCENT: 3 % (ref 0–6)
GFR AFRICAN AMERICAN: >60
GFR NON-AFRICAN AMERICAN: >60 ML/MIN/1.73
GLUCOSE BLD-MCNC: 156 MG/DL (ref 74–99)
HCT VFR BLD CALC: 42.6 % (ref 34–48)
HDLC SERPL-MCNC: 54 MG/DL
HEMOGLOBIN: 13.7 G/DL (ref 11.5–15.5)
IMMATURE GRANULOCYTES #: 0.01 E9/L
IMMATURE GRANULOCYTES %: 0.2 % (ref 0–5)
LDL CHOLESTEROL CALCULATED: 99 MG/DL (ref 0–99)
LYMPHOCYTES ABSOLUTE: 1.84 E9/L (ref 1.5–4)
LYMPHOCYTES RELATIVE PERCENT: 27.7 % (ref 20–42)
MCH RBC QN AUTO: 30.2 PG (ref 26–35)
MCHC RBC AUTO-ENTMCNC: 32.2 % (ref 32–34.5)
MCV RBC AUTO: 93.8 FL (ref 80–99.9)
MONOCYTES ABSOLUTE: 0.69 E9/L (ref 0.1–0.95)
MONOCYTES RELATIVE PERCENT: 10.4 % (ref 2–12)
NEUTROPHILS ABSOLUTE: 3.84 E9/L (ref 1.8–7.3)
NEUTROPHILS RELATIVE PERCENT: 57.6 % (ref 43–80)
PDW BLD-RTO: 12.6 FL (ref 11.5–15)
PLATELET # BLD: 219 E9/L (ref 130–450)
PMV BLD AUTO: 12.4 FL (ref 7–12)
POTASSIUM SERPL-SCNC: 4.7 MMOL/L (ref 3.5–5)
RBC # BLD: 4.54 E12/L (ref 3.5–5.5)
SODIUM BLD-SCNC: 141 MMOL/L (ref 132–146)
TOTAL PROTEIN: 7.1 G/DL (ref 6.4–8.3)
TRIGL SERPL-MCNC: 124 MG/DL (ref 0–149)
TSH SERPL DL<=0.05 MIU/L-ACNC: 0.34 UIU/ML (ref 0.27–4.2)
VLDLC SERPL CALC-MCNC: 25 MG/DL
WBC # BLD: 6.7 E9/L (ref 4.5–11.5)

## 2022-10-06 ENCOUNTER — OFFICE VISIT (OUTPATIENT)
Dept: FAMILY MEDICINE CLINIC | Age: 82
End: 2022-10-06
Payer: MEDICARE

## 2022-10-06 VITALS
OXYGEN SATURATION: 99 % | TEMPERATURE: 97.8 F | WEIGHT: 181.8 LBS | DIASTOLIC BLOOD PRESSURE: 80 MMHG | HEART RATE: 75 BPM | BODY MASS INDEX: 32.21 KG/M2 | RESPIRATION RATE: 20 BRPM | SYSTOLIC BLOOD PRESSURE: 132 MMHG | HEIGHT: 63 IN

## 2022-10-06 DIAGNOSIS — C34.92 ADENOCARCINOMA, LUNG, LEFT (HCC): ICD-10-CM

## 2022-10-06 DIAGNOSIS — E03.9 ACQUIRED HYPOTHYROIDISM: Primary | ICD-10-CM

## 2022-10-06 DIAGNOSIS — J43.8 OTHER EMPHYSEMA (HCC): ICD-10-CM

## 2022-10-06 DIAGNOSIS — R73.09 ELEVATED GLUCOSE: ICD-10-CM

## 2022-10-06 DIAGNOSIS — Z85.3 PERSONAL HISTORY OF BREAST CANCER: ICD-10-CM

## 2022-10-06 DIAGNOSIS — Z23 NEED FOR INFLUENZA VACCINATION: ICD-10-CM

## 2022-10-06 DIAGNOSIS — E78.2 MIXED HYPERLIPIDEMIA: ICD-10-CM

## 2022-10-06 PROCEDURE — 1123F ACP DISCUSS/DSCN MKR DOCD: CPT | Performed by: PHYSICIAN ASSISTANT

## 2022-10-06 PROCEDURE — 90694 VACC AIIV4 NO PRSRV 0.5ML IM: CPT | Performed by: PHYSICIAN ASSISTANT

## 2022-10-06 PROCEDURE — 99213 OFFICE O/P EST LOW 20 MIN: CPT | Performed by: PHYSICIAN ASSISTANT

## 2022-10-06 PROCEDURE — G0008 ADMIN INFLUENZA VIRUS VAC: HCPCS | Performed by: PHYSICIAN ASSISTANT

## 2022-10-06 RX ORDER — ALBUTEROL SULFATE 90 UG/1
2 AEROSOL, METERED RESPIRATORY (INHALATION) EVERY 6 HOURS PRN
Qty: 1 EACH | Refills: 6 | Status: SHIPPED | OUTPATIENT
Start: 2022-10-06

## 2022-10-06 RX ORDER — SIMVASTATIN 40 MG
40 TABLET ORAL NIGHTLY
Qty: 90 TABLET | Refills: 1 | Status: SHIPPED | OUTPATIENT
Start: 2022-10-06

## 2022-10-06 ASSESSMENT — PATIENT HEALTH QUESTIONNAIRE - PHQ9
SUM OF ALL RESPONSES TO PHQ QUESTIONS 1-9: 0
SUM OF ALL RESPONSES TO PHQ9 QUESTIONS 1 & 2: 0
1. LITTLE INTEREST OR PLEASURE IN DOING THINGS: 0
2. FEELING DOWN, DEPRESSED OR HOPELESS: 0
SUM OF ALL RESPONSES TO PHQ QUESTIONS 1-9: 0

## 2022-10-06 ASSESSMENT — LIFESTYLE VARIABLES
HOW MANY STANDARD DRINKS CONTAINING ALCOHOL DO YOU HAVE ON A TYPICAL DAY: PATIENT DOES NOT DRINK
HOW OFTEN DO YOU HAVE A DRINK CONTAINING ALCOHOL: NEVER

## 2022-10-06 NOTE — PROGRESS NOTES
Assessment & Plan     Acquired hypothyroidism  -     CBC with Auto Differential; Future  -     Comprehensive Metabolic Panel; Future  -     TSH; Future  Mixed hyperlipidemia  -     simvastatin (ZOCOR) 40 MG tablet; Take 1 tablet by mouth nightly, Disp-90 tablet, R-1Normal  -     CBC with Auto Differential; Future  -     Comprehensive Metabolic Panel; Future  -     Lipid Panel; Future  Adenocarcinoma, lung, left ([facility] [address]  -     fluticasone-umeclidin-vilant (Madalynn Cools) 100-62.5-25 MCG/INH AEPB; Inhale 1 puff into the lungs daily, Disp-180 each, R-04 inhalers given LOT [id number] EXP 01/2024Sample- samples provided as she is in the donut hole with insurance  -     albuterol sulfate HFA (PROAIR HFA) 108 (90 Base) MCG/ACT inhaler; Inhale 2 puffs into the lungs every 6 hours as needed for Wheezing, Disp-1 each, R-6Normal  Other emphysema ([facility] [address]  -     fluticasone-umeclidin-vilant (Madalynn Cools) 100-62.5-25 MCG/INH AEPB; Inhale 1 puff into the lungs daily, Disp-180 each, R-04 inhalers given LOT [id number] EXP 01/2024Sample  Personal history of breast cancer  Elevated glucose  -     Hemoglobin A1C; Future  Need for influenza vaccination  -     Influenza, FLUAD, (age 72 y+), IM, PF, 0.5 mL     Return in 6 months (on 4/6/2023)        Subjective     Hypothyroidism: Levothyroxine 125mcg. TSH WNL  HLD: Simvastatin 40mg  Adenocarcinoma/COPD/emphysema: Hx of Stage Ib adenocarcinoma left lung with VATS left upper lobectomy September 19, 2017 at [facility]. 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 The patient has the following   family history of breast cancer-  sister, at age 68.  The patient has a   history of right ultrasound core biopsy in September, 2012 - dcis,   right Lumpectomy in 2012 - dcis and left Excisional Biopsy more than 10   years ago - benign. Glucose has been elevated over the last few labs. No history of DM. Objective   Vitals:    10/06/22 0905 10/06/22 0913 10/06/22 0935   BP: (!) 148/72 (!) 146/76 132/80   Pulse: 75     Resp: 20     Temp: 97.8 °F (36.6 °C)     SpO2: 99%     Weight: 181 lb 12.8 oz (82.5 kg)     Height: 5' 2.5\" (1.588 m)        Body mass index is 32.72 kg/m². General Appearance: alert and oriented to person, place and time, well developed and well- nourished, in no acute distress  Skin: warm and dry, no rash or erythema  Head: normocephalic and atraumatic  Eyes: pupils equal, round, and reactive to light, extraocular eye movements intact, conjunctivae normal  ENT: tympanic membrane, external ear and ear canal normal bilaterally, nose without deformity, nasal mucosa and turbinates normal without polyps  Neck: supple and non-tender without mass, no thyromegaly or thyroid nodules, no cervical lymphadenopathy  Pulmonary/Chest: clear to auscultation bilaterally- no wheezes, rales or rhonchi, normal air movement, no respiratory distress  Cardiovascular: normal rate, regular rhythm, normal S1 and S2, no murmurs, rubs, clicks, or gallops, distal pulses intact, no carotid bruits  Abdomen: soft, non-tender, non-distended, normal bowel sounds, no masses or organomegaly  Extremities: no cyanosis, clubbing or edema  Musculoskeletal: normal range of motion, no joint swelling, deformity or tenderness  Neurologic: reflexes normal and symmetric, no cranial nerve deficit, gait, coordination and speech normal       Allergies   Allergen Reactions    Beta Adrenergic Blockers Shortness Of Breath     Severe wheezing with timolol    Timolol Maleate Shortness Of Breath    Aspirin      bruising    Pcn [Penicillins] Hives     Prior to Visit Medications    Medication Sig Taking?  Authorizing Provider simvastatin (ZOCOR) 40 MG tablet Take 1 tablet by mouth nightly Yes Caterina George PA-C   fluticasone-umeclidin-vilant (TRELEGY ELLIPTA) 100-62.5-25 MCG/INH AEPB Inhale 1 puff into the lungs daily Yes Caterina George PA-C   albuterol sulfate HFA (PROAIR HFA) 108 (90 Base) MCG/ACT inhaler Inhale 2 puffs into the lungs every 6 hours as needed for Wheezing Yes Caterina George PA-C   levothyroxine (SYNTHROID) 125 MCG tablet Take 1 tablet by mouth daily Yes Elizabeth Aguilera MD   dorzolamide (TRUSOPT) 2 % ophthalmic solution INSTILL 1 DROP INTO EACH EYE 3 TIMES A DAY Yes Historical Provider, MD   triamcinolone (NASACORT) 55 MCG/ACT nasal inhaler 2 sprays by Each Nostril route daily Yes Historical Provider, MD   omeprazole (PRILOSEC) 20 MG delayed release capsule Take 40 mg by mouth daily  Yes Historical Provider, MD   latanoprost (XALATAN) 0.005 % ophthalmic solution Place 1 drop into the right eye nightly  Yes Historical Provider, MD   albuterol (PROVENTIL) (2.5 MG/3ML) 0.083% nebulizer solution Take 3 mLs by nebulization every 6 hours as needed for Wheezing  Patient not taking: No sig reported  Elizabeth Aguilera MD     [facility] (Including outside providers/suppliers regularly involved in providing care):   Patient Care Team:  Elizabeth Aguilera MD as PCP - General (Family Medicine)  Elizabeth Aguilera MD as PCP - [facility] Empaneled Provider  Kameron Bautista MD as Consulting Physician (Hematology and Oncology)     Reviewed and updated this visit:  Tobacco  Allergies  Meds  Med Hx  Surg Hx  Soc Hx  Fam Hx

## 2022-10-06 NOTE — PATIENT INSTRUCTIONS
Personalized Preventive Plan for Yolande Ibarra - 10/6/2022  Medicare offers a range of preventive health benefits. Some of the tests and screenings are paid in full while other may be subject to a deductible, co-insurance, and/or copay. Some of these benefits include a comprehensive review of your medical history including lifestyle, illnesses that may run in your family, and various assessments and screenings as appropriate. After reviewing your medical record and screening and assessments performed today your provider may have ordered immunizations, labs, imaging, and/or referrals for you. A list of these orders (if applicable) as well as your Preventive Care list are included within your After Visit Summary for your review. Other Preventive Recommendations:    A preventive eye exam performed by an eye specialist is recommended every 1-2 years to screen for glaucoma; cataracts, macular degeneration, and other eye disorders. A preventive dental visit is recommended every 6 months. Try to get at least 150 minutes of exercise per week or 10,000 steps per day on a pedometer . Order or download the FREE \"Exercise & Physical Activity: Your Everyday Guide\" from [employer]. Call 4-133.704.6740 or search [employer] online. You need 3922-5814 mg of calcium and 5832-3244 IU of vitamin D per day. It is possible to meet your calcium requirement with diet alone, but a vitamin D supplement is usually necessary to meet this goal.  When exposed to the sun, use a sunscreen that protects against both UVA and UVB radiation with an SPF of 30 or greater. Reapply every 2 to 3 hours or after sweating, drying off with a towel, or swimming. Always wear a seat belt when traveling in a car. Always wear a helmet when riding a bicycle or motorcycle.

## 2022-11-01 DIAGNOSIS — J43.8 OTHER EMPHYSEMA (HCC): ICD-10-CM

## 2022-11-01 DIAGNOSIS — C34.92 ADENOCARCINOMA, LUNG, LEFT (HCC): ICD-10-CM

## 2022-11-01 NOTE — TELEPHONE ENCOUNTER
----- Message from Love Will sent at 11/1/2022 10:38 AM EDT -----  Subject: Message to Provider    QUESTIONS  Information for Provider? Patient states that she takes Trilogy and the    of the Medication they would give her a 3 month supply free. Pt would like to have the office fax her a written prescription so she can   send to them. Fax number [phone]. please call patient to advise.  ---------------------------------------------------------------------------  --------------  Cathryn TORREZ  [phone]; OK to leave message on voicemail  ---------------------------------------------------------------------------  --------------  SCRIPT ANSWERS  Relationship to Patient?  Self

## 2022-11-03 DIAGNOSIS — J43.8 OTHER EMPHYSEMA (HCC): ICD-10-CM

## 2022-11-03 DIAGNOSIS — C34.92 ADENOCARCINOMA, LUNG, LEFT (HCC): ICD-10-CM

## 2022-11-03 NOTE — TELEPHONE ENCOUNTER
Medication Refill Request    LOV 10/6/2022  NOV 4/12/2023    Lab Results   Component Value Date    CREATININE 0.6 10/03/2022       Pt called and left a voicemail stating she needs a 90 day script printed because she has to have it sent over to [facility].     Electronically signed by Estefania Marinelli MA on 11/3/2022 at 3:58 PM

## 2022-11-10 ENCOUNTER — TELEPHONE (OUTPATIENT)
Dept: FAMILY MEDICINE CLINIC | Age: 82
End: 2022-11-10

## 2022-11-10 DIAGNOSIS — J43.8 OTHER EMPHYSEMA (HCC): ICD-10-CM

## 2022-11-10 DIAGNOSIS — C34.92 ADENOCARCINOMA, LUNG, LEFT (HCC): ICD-10-CM

## 2022-11-10 NOTE — TELEPHONE ENCOUNTER
Tremaine Licha called about having a 3 month Rx for Trelegy mailed to her so that she can send it in to the company to get a free 3 month supply. I saw on 11/01/22 it was ordered, but she did not receive it.   Thank you

## 2022-11-11 NOTE — TELEPHONE ENCOUNTER
Looks like Rx was signed on a Tuesday which Dr would not have been in the office. Please resign Rx for patient.

## 2022-11-14 ENCOUNTER — OFFICE VISIT (OUTPATIENT)
Dept: FAMILY MEDICINE CLINIC | Age: 82
End: 2022-11-14
Payer: MEDICARE

## 2022-11-14 DIAGNOSIS — S81.812A LACERATION OF LEFT LOWER EXTREMITY, INITIAL ENCOUNTER: Primary | ICD-10-CM

## 2022-11-14 PROCEDURE — 99213 OFFICE O/P EST LOW 20 MIN: CPT | Performed by: FAMILY MEDICINE

## 2022-11-14 PROCEDURE — 1123F ACP DISCUSS/DSCN MKR DOCD: CPT | Performed by: FAMILY MEDICINE

## 2022-11-14 NOTE — PROGRESS NOTES
2022    Current Outpatient Medications   Medication Sig Dispense Refill    fluticasone-umeclidin-vilant (TRELEGY ELLIPTA) 100-62.5-25 MCG/INH AEPB Inhale 1 puff into the lungs daily 3 each 3    simvastatin (ZOCOR) 40 MG tablet Take 1 tablet by mouth nightly 90 tablet 1    albuterol sulfate HFA (PROAIR HFA) 108 (90 Base) MCG/ACT inhaler Inhale 2 puffs into the lungs every 6 hours as needed for Wheezing 1 each 6    levothyroxine (SYNTHROID) 125 MCG tablet Take 1 tablet by mouth daily 90 tablet 1    dorzolamide (TRUSOPT) 2 % ophthalmic solution INSTILL 1 DROP INTO EACH EYE 3 TIMES A DAY      triamcinolone (NASACORT) 55 MCG/ACT nasal inhaler 2 sprays by Each Nostril route daily      omeprazole (PRILOSEC) 20 MG delayed release capsule Take 40 mg by mouth daily       latanoprost (XALATAN) 0.005 % ophthalmic solution Place 1 drop into the right eye nightly       predniSONE (DELTASONE) 10 MG tablet 40 mg x 3 days, 30 mg x 3 days, 20 mg x 3 days, 10 mg x 3 days (Patient not taking: Reported on 2022) 30 tablet 0    albuterol (PROVENTIL) (2.5 MG/3ML) 0.083% nebulizer solution Take 3 mLs by nebulization every 6 hours as needed for Wheezing (Patient not taking: No sig reported) 120 each 3     No current facility-administered medications for this visit. Chantal Hodgson (: 1940 IS A 78 y.o. female ,Established patient, here for eval of Urticaria (On antibiotic for laceration of leg-Bactrim DS) and Laceration (Left leg)    Oct 11 influenza  Dosepak 40-30-20  Erythromycin    Pulled out dining room chair last week hit leg with a chair  Hit leg   Avulsion L leg    Used polysporin   A few days later said there was a red ring aroun it    Tdap and    Bactrim x 7 days finished  yesterday  Developed rash  Went to 911 gave her steroid shot          ASSESSMENT / PLAN      1.  Laceration of left lower extremity, initial encounter  See media photo  It is healing well no drainage  Min redness  Seems like it improving  Rash on body is improving  Allergy to bactrim               SUBJECTIVE    Review of Systems   Constitutional: Negative. Respiratory: Negative. Skin:  Positive for rash and wound. OBJECTIVE    Wt Readings from Last 3 Encounters:   10/06/22 181 lb 12.8 oz (82.5 kg)   08/23/22 180 lb (81.6 kg)   05/19/22 174 lb (78.9 kg)       There were no vitals filed for this visit. Physical Exam  Constitutional:       Appearance: Normal appearance. Cardiovascular:      Rate and Rhythm: Normal rate. Pulses: Normal pulses. Pulmonary:      Effort: Pulmonary effort is normal.   Skin:     Comments: See above note   Neurological:      Mental Status: She is alert. Return in about 1 week (around 11/21/2022). An electronic signature was used to authenticate this note.     YELENA Hernández    11/20/2022

## 2022-11-20 ASSESSMENT — ENCOUNTER SYMPTOMS: RESPIRATORY NEGATIVE: 1

## 2022-11-21 ENCOUNTER — OFFICE VISIT (OUTPATIENT)
Dept: FAMILY MEDICINE CLINIC | Age: 82
End: 2022-11-21
Payer: MEDICARE

## 2022-11-21 VITALS
RESPIRATION RATE: 16 BRPM | TEMPERATURE: 96.9 F | SYSTOLIC BLOOD PRESSURE: 128 MMHG | BODY MASS INDEX: 33.16 KG/M2 | WEIGHT: 180.2 LBS | HEART RATE: 63 BPM | HEIGHT: 62 IN | OXYGEN SATURATION: 94 % | DIASTOLIC BLOOD PRESSURE: 68 MMHG

## 2022-11-21 DIAGNOSIS — S81.812D NONINFECTED SKIN TEAR OF LEFT LOWER EXTREMITY, SUBSEQUENT ENCOUNTER: ICD-10-CM

## 2022-11-21 DIAGNOSIS — B37.0 THRUSH: Primary | ICD-10-CM

## 2022-11-21 DIAGNOSIS — S80.12XD CONTUSION OF LEFT LOWER EXTREMITY, SUBSEQUENT ENCOUNTER: ICD-10-CM

## 2022-11-21 PROCEDURE — 1123F ACP DISCUSS/DSCN MKR DOCD: CPT | Performed by: FAMILY MEDICINE

## 2022-11-21 PROCEDURE — 99213 OFFICE O/P EST LOW 20 MIN: CPT | Performed by: FAMILY MEDICINE

## 2022-11-21 RX ORDER — FLUCONAZOLE 100 MG/1
TABLET ORAL
Qty: 8 TABLET | Refills: 0 | Status: SHIPPED | OUTPATIENT
Start: 2022-11-21

## 2022-11-21 ASSESSMENT — PATIENT HEALTH QUESTIONNAIRE - PHQ9
2. FEELING DOWN, DEPRESSED OR HOPELESS: 0
SUM OF ALL RESPONSES TO PHQ QUESTIONS 1-9: 0
1. LITTLE INTEREST OR PLEASURE IN DOING THINGS: 0
SUM OF ALL RESPONSES TO PHQ QUESTIONS 1-9: 0
SUM OF ALL RESPONSES TO PHQ QUESTIONS 1-9: 0
SUM OF ALL RESPONSES TO PHQ9 QUESTIONS 1 & 2: 0
SUM OF ALL RESPONSES TO PHQ QUESTIONS 1-9: 0

## 2022-11-22 ENCOUNTER — TELEPHONE (OUTPATIENT)
Dept: FAMILY MEDICINE CLINIC | Age: 82
End: 2022-11-22

## 2022-11-22 NOTE — TELEPHONE ENCOUNTER
Patient called stating we need to clarify quantity of the diflucan.   Spoke with the Pharmacist and gave verbal to fill #9 instead of #8

## 2023-01-19 DIAGNOSIS — J20.9 ACUTE BRONCHITIS, UNSPECIFIED ORGANISM: ICD-10-CM

## 2023-01-19 DIAGNOSIS — Z90.2 HISTORY OF LOBECTOMY OF LUNG: ICD-10-CM

## 2023-01-19 DIAGNOSIS — C34.92 ADENOCARCINOMA, LUNG, LEFT (HCC): ICD-10-CM

## 2023-01-19 RX ORDER — ALBUTEROL SULFATE 2.5 MG/3ML
2.5 SOLUTION RESPIRATORY (INHALATION) EVERY 6 HOURS PRN
Qty: 120 EACH | Refills: 3 | Status: SHIPPED | OUTPATIENT
Start: 2023-01-19

## 2023-01-19 NOTE — TELEPHONE ENCOUNTER
Pt is requesting a refill on albuterol 0.083% nebulizer solution.    [facility] [place] [place] [facility] [employer]

## 2023-03-03 ENCOUNTER — TELEPHONE (OUTPATIENT)
Dept: FAMILY MEDICINE CLINIC | Age: 83
End: 2023-03-03

## 2023-03-03 DIAGNOSIS — E03.9 ACQUIRED HYPOTHYROIDISM: ICD-10-CM

## 2023-03-03 RX ORDER — LEVOTHYROXINE SODIUM 0.12 MG/1
125 TABLET ORAL DAILY
Qty: 90 TABLET | Refills: 1 | Status: SHIPPED | OUTPATIENT
Start: 2023-03-03

## 2023-03-03 NOTE — TELEPHONE ENCOUNTER
Medication Refill Request    LOV 11/21/2022  NOV 4/17/2023    Lab Results   Component Value Date    CREATININE 0.6 10/03/2022

## 2023-03-29 DIAGNOSIS — E03.9 ACQUIRED HYPOTHYROIDISM: ICD-10-CM

## 2023-03-29 DIAGNOSIS — E78.2 MIXED HYPERLIPIDEMIA: ICD-10-CM

## 2023-03-29 DIAGNOSIS — R73.09 ELEVATED GLUCOSE: ICD-10-CM

## 2023-03-29 LAB
ALBUMIN SERPL-MCNC: 4.6 G/DL (ref 3.5–5.2)
ALP SERPL-CCNC: 92 U/L (ref 35–104)
ALT SERPL-CCNC: 46 U/L (ref 0–32)
ANION GAP SERPL CALCULATED.3IONS-SCNC: 8 MMOL/L (ref 7–16)
AST SERPL-CCNC: 26 U/L (ref 0–31)
BASOPHILS # BLD: 0.07 E9/L (ref 0–0.2)
BASOPHILS NFR BLD: 1.3 % (ref 0–2)
BILIRUB SERPL-MCNC: 0.4 MG/DL (ref 0–1.2)
BUN SERPL-MCNC: 16 MG/DL (ref 6–23)
CALCIUM SERPL-MCNC: 10.3 MG/DL (ref 8.6–10.2)
CHLORIDE SERPL-SCNC: 107 MMOL/L (ref 98–107)
CHOLESTEROL, TOTAL: 172 MG/DL (ref 0–199)
CO2 SERPL-SCNC: 29 MMOL/L (ref 22–29)
CREAT SERPL-MCNC: 0.6 MG/DL (ref 0.5–1)
EOSINOPHIL # BLD: 0.21 E9/L (ref 0.05–0.5)
EOSINOPHIL NFR BLD: 4 % (ref 0–6)
ERYTHROCYTE [DISTWIDTH] IN BLOOD BY AUTOMATED COUNT: 12.3 FL (ref 11.5–15)
GLUCOSE SERPL-MCNC: 205 MG/DL (ref 74–99)
HBA1C MFR BLD: 9.3 % (ref 4–5.6)
HCT VFR BLD AUTO: 44.2 % (ref 34–48)
HDLC SERPL-MCNC: 54 MG/DL
HGB BLD-MCNC: 14.3 G/DL (ref 11.5–15.5)
IMM GRANULOCYTES # BLD: 0.03 E9/L
IMM GRANULOCYTES NFR BLD: 0.6 % (ref 0–5)
LDLC SERPL CALC-MCNC: 89 MG/DL (ref 0–99)
LYMPHOCYTES # BLD: 1.49 E9/L (ref 1.5–4)
LYMPHOCYTES NFR BLD: 28.3 % (ref 20–42)
MCH RBC QN AUTO: 31.1 PG (ref 26–35)
MCHC RBC AUTO-ENTMCNC: 32.4 % (ref 32–34.5)
MCV RBC AUTO: 96.1 FL (ref 80–99.9)
MONOCYTES # BLD: 0.54 E9/L (ref 0.1–0.95)
MONOCYTES NFR BLD: 10.2 % (ref 2–12)
NEUTROPHILS # BLD: 2.93 E9/L (ref 1.8–7.3)
NEUTS SEG NFR BLD: 55.6 % (ref 43–80)
PLATELET # BLD AUTO: 213 E9/L (ref 130–450)
PMV BLD AUTO: 12.3 FL (ref 7–12)
POTASSIUM SERPL-SCNC: 5.7 MMOL/L (ref 3.5–5)
PROT SERPL-MCNC: 6.9 G/DL (ref 6.4–8.3)
RBC # BLD AUTO: 4.6 E12/L (ref 3.5–5.5)
SODIUM SERPL-SCNC: 144 MMOL/L (ref 132–146)
TRIGL SERPL-MCNC: 145 MG/DL (ref 0–149)
TSH SERPL-MCNC: 0.69 UIU/ML (ref 0.27–4.2)
VLDLC SERPL CALC-MCNC: 29 MG/DL
WBC # BLD: 5.3 E9/L (ref 4.5–11.5)

## 2023-03-30 DIAGNOSIS — E87.5 SERUM POTASSIUM ELEVATED: Primary | ICD-10-CM

## 2023-04-17 ENCOUNTER — OFFICE VISIT (OUTPATIENT)
Dept: FAMILY MEDICINE CLINIC | Age: 83
End: 2023-04-17
Payer: MEDICARE

## 2023-04-17 VITALS
WEIGHT: 179.6 LBS | OXYGEN SATURATION: 98 % | BODY MASS INDEX: 32.85 KG/M2 | SYSTOLIC BLOOD PRESSURE: 130 MMHG | DIASTOLIC BLOOD PRESSURE: 64 MMHG | TEMPERATURE: 97.5 F | RESPIRATION RATE: 20 BRPM | HEART RATE: 79 BPM

## 2023-04-17 DIAGNOSIS — E11.65 TYPE 2 DIABETES MELLITUS WITH HYPERGLYCEMIA, WITHOUT LONG-TERM CURRENT USE OF INSULIN (HCC): ICD-10-CM

## 2023-04-17 DIAGNOSIS — E87.5 HYPERKALEMIA: ICD-10-CM

## 2023-04-17 DIAGNOSIS — E03.9 ACQUIRED HYPOTHYROIDISM: Primary | ICD-10-CM

## 2023-04-17 DIAGNOSIS — J20.9 ACUTE BRONCHITIS, UNSPECIFIED ORGANISM: ICD-10-CM

## 2023-04-17 DIAGNOSIS — C34.92 ADENOCARCINOMA, LUNG, LEFT (HCC): ICD-10-CM

## 2023-04-17 DIAGNOSIS — E78.2 MIXED HYPERLIPIDEMIA: ICD-10-CM

## 2023-04-17 DIAGNOSIS — Z85.3 PERSONAL HISTORY OF BREAST CANCER: ICD-10-CM

## 2023-04-17 DIAGNOSIS — J43.8 OTHER EMPHYSEMA (HCC): ICD-10-CM

## 2023-04-17 PROBLEM — E11.9 TYPE 2 DIABETES MELLITUS (HCC): Status: ACTIVE | Noted: 2023-04-17

## 2023-04-17 PROBLEM — E11.9 TYPE 2 DIABETES MELLITUS (HCC): Status: RESOLVED | Noted: 2023-04-17 | Resolved: 2023-04-17

## 2023-04-17 PROCEDURE — 1123F ACP DISCUSS/DSCN MKR DOCD: CPT | Performed by: PHYSICIAN ASSISTANT

## 2023-04-17 PROCEDURE — 3046F HEMOGLOBIN A1C LEVEL >9.0%: CPT | Performed by: PHYSICIAN ASSISTANT

## 2023-04-17 PROCEDURE — 99214 OFFICE O/P EST MOD 30 MIN: CPT | Performed by: PHYSICIAN ASSISTANT

## 2023-04-17 RX ORDER — BENZONATATE 100 MG/1
100 CAPSULE ORAL 3 TIMES DAILY PRN
Qty: 30 CAPSULE | Refills: 0 | Status: SHIPPED | OUTPATIENT
Start: 2023-04-17 | End: 2023-04-27

## 2023-04-17 RX ORDER — ALBUTEROL SULFATE 90 UG/1
2 AEROSOL, METERED RESPIRATORY (INHALATION) EVERY 6 HOURS PRN
Qty: 1 EACH | Refills: 6 | Status: SHIPPED | OUTPATIENT
Start: 2023-04-17

## 2023-04-17 RX ORDER — SIMVASTATIN 40 MG
40 TABLET ORAL NIGHTLY
Qty: 90 TABLET | Refills: 1 | Status: SHIPPED | OUTPATIENT
Start: 2023-04-17

## 2023-04-17 RX ORDER — DOXYCYCLINE HYCLATE 100 MG
100 TABLET ORAL 2 TIMES DAILY WITH MEALS
Qty: 20 TABLET | Refills: 0 | Status: SHIPPED | OUTPATIENT
Start: 2023-04-17 | End: 2023-04-27

## 2023-04-17 SDOH — ECONOMIC STABILITY: FOOD INSECURITY: WITHIN THE PAST 12 MONTHS, THE FOOD YOU BOUGHT JUST DIDN'T LAST AND YOU DIDN'T HAVE MONEY TO GET MORE.: NEVER TRUE

## 2023-04-17 SDOH — ECONOMIC STABILITY: INCOME INSECURITY: HOW HARD IS IT FOR YOU TO PAY FOR THE VERY BASICS LIKE FOOD, HOUSING, MEDICAL CARE, AND HEATING?: NOT HARD AT ALL

## 2023-04-17 SDOH — ECONOMIC STABILITY: FOOD INSECURITY: WITHIN THE PAST 12 MONTHS, YOU WORRIED THAT YOUR FOOD WOULD RUN OUT BEFORE YOU GOT MONEY TO BUY MORE.: NEVER TRUE

## 2023-04-17 ASSESSMENT — PATIENT HEALTH QUESTIONNAIRE - PHQ9
SUM OF ALL RESPONSES TO PHQ QUESTIONS 1-9: 0
SUM OF ALL RESPONSES TO PHQ QUESTIONS 1-9: 0
SUM OF ALL RESPONSES TO PHQ9 QUESTIONS 1 & 2: 0
1. LITTLE INTEREST OR PLEASURE IN DOING THINGS: 0
2. FEELING DOWN, DEPRESSED OR HOPELESS: 0
SUM OF ALL RESPONSES TO PHQ QUESTIONS 1-9: 0
SUM OF ALL RESPONSES TO PHQ QUESTIONS 1-9: 0

## 2023-04-17 ASSESSMENT — LIFESTYLE VARIABLES
HOW OFTEN DO YOU HAVE A DRINK CONTAINING ALCOHOL: NEVER
HOW MANY STANDARD DRINKS CONTAINING ALCOHOL DO YOU HAVE ON A TYPICAL DAY: PATIENT DOES NOT DRINK

## 2023-04-17 NOTE — PROGRESS NOTES
Eosinophils Absolute 0.21 0.05 - 0.50 E9/L    Basophils Absolute 0.07 0.00 - 0.20 E9/L     Radiology: All Radiology results interpreted by Radiologist unless otherwise noted. -------------------------------------Impression & Disposition Section-----------------------------------  Impression(s):  Eliza Way was seen today for hypothyroidism and other. Diagnoses and all orders for this visit:    Acquired hypothyroidism    Type 2 diabetes mellitus with hyperglycemia, without long-term current use of insulin (HCC)  -     metFORMIN (GLUCOPHAGE) 500 MG tablet; Take 1 tablet by mouth daily (with breakfast)  -     Start Metformin daily. Discussed diet- will watch white carbs, stop fruit juices and candy. Discussed monitoring BS; however, she wishes to hold off for now    Mixed hyperlipidemia  -     simvastatin (ZOCOR) 40 MG tablet; Take 1 tablet by mouth nightly    Personal history of breast cancer    Adenocarcinoma, lung, left ([address]  -     Handicap Placard MISC; by Does not apply route Exp 4/28  -     albuterol sulfate HFA (PROAIR HFA) 108 (90 Base) MCG/ACT inhaler; Inhale 2 puffs into the lungs every 6 hours as needed for Wheezing    Other emphysema (HCC)    Acute bronchitis, unspecified organism  -     doxycycline hyclate (VIBRA-TABS) 100 MG tablet; Take 1 tablet by mouth 2 times daily (with meals) for 10 days  -     benzonatate (TESSALON PERLES) 100 MG capsule; Take 1 capsule by mouth 3 times daily as needed for Cough    Hyperkalemia         -    Had repeat labs today    Return in about 1 month (around 5/17/2023) for Diabetes.

## 2023-05-01 ENCOUNTER — APPOINTMENT (OUTPATIENT)
Dept: CT IMAGING | Age: 83
DRG: 192 | End: 2023-05-01
Payer: MEDICARE

## 2023-05-01 ENCOUNTER — APPOINTMENT (OUTPATIENT)
Dept: GENERAL RADIOLOGY | Age: 83
DRG: 192 | End: 2023-05-01
Payer: MEDICARE

## 2023-05-01 ENCOUNTER — HOSPITAL ENCOUNTER (INPATIENT)
Age: 83
LOS: 2 days | Discharge: HOME | DRG: 192 | End: 2023-05-03
Attending: EMERGENCY MEDICINE | Admitting: STUDENT IN AN ORGANIZED HEALTH CARE EDUCATION/TRAINING PROGRAM
Payer: MEDICARE

## 2023-05-01 DIAGNOSIS — J44.1 COPD EXACERBATION (HCC): Primary | ICD-10-CM

## 2023-05-01 LAB
ALBUMIN SERPL-MCNC: 4.4 G/DL (ref 3.5–5.2)
ALP SERPL-CCNC: 100 U/L (ref 35–104)
ALT SERPL-CCNC: 31 U/L (ref 0–32)
ANION GAP SERPL CALCULATED.3IONS-SCNC: 9 MMOL/L (ref 7–16)
AST SERPL-CCNC: 19 U/L (ref 0–31)
B PARAP IS1001 DNA NPH QL NAA+NON-PROBE: NOT DETECTED
B PERT.PT PRMT NPH QL NAA+NON-PROBE: NOT DETECTED
BASOPHILS # BLD: 0.06 E9/L (ref 0–0.2)
BASOPHILS NFR BLD: 0.9 % (ref 0–2)
BILIRUB SERPL-MCNC: 0.5 MG/DL (ref 0–1.2)
BNP BLD-MCNC: 31 PG/ML (ref 0–450)
BUN SERPL-MCNC: 11 MG/DL (ref 6–23)
C PNEUM DNA NPH QL NAA+NON-PROBE: NOT DETECTED
CALCIUM SERPL-MCNC: 9.8 MG/DL (ref 8.6–10.2)
CHLORIDE SERPL-SCNC: 105 MMOL/L (ref 98–107)
CO2 SERPL-SCNC: 26 MMOL/L (ref 22–29)
CREAT SERPL-MCNC: 0.6 MG/DL (ref 0.5–1)
D DIMER: 275 NG/ML DDU
EKG ATRIAL RATE: 82 BPM
EKG P AXIS: 64 DEGREES
EKG P-R INTERVAL: 176 MS
EKG Q-T INTERVAL: 410 MS
EKG QRS DURATION: 138 MS
EKG QTC CALCULATION (BAZETT): 479 MS
EKG R AXIS: -66 DEGREES
EKG T AXIS: 28 DEGREES
EKG VENTRICULAR RATE: 82 BPM
EOSINOPHIL # BLD: 0.8 E9/L (ref 0.05–0.5)
EOSINOPHIL NFR BLD: 12 % (ref 0–6)
ERYTHROCYTE [DISTWIDTH] IN BLOOD BY AUTOMATED COUNT: 12.5 FL (ref 11.5–15)
FLUAV RNA NPH QL NAA+NON-PROBE: NOT DETECTED
FLUBV RNA NPH QL NAA+NON-PROBE: NOT DETECTED
GLUCOSE SERPL-MCNC: 176 MG/DL (ref 74–99)
HADV DNA NPH QL NAA+NON-PROBE: NOT DETECTED
HCOV 229E RNA NPH QL NAA+NON-PROBE: NOT DETECTED
HCOV HKU1 RNA NPH QL NAA+NON-PROBE: NOT DETECTED
HCOV NL63 RNA NPH QL NAA+NON-PROBE: NOT DETECTED
HCOV OC43 RNA NPH QL NAA+NON-PROBE: NOT DETECTED
HCT VFR BLD AUTO: 42.8 % (ref 34–48)
HGB BLD-MCNC: 14.2 G/DL (ref 11.5–15.5)
HMPV RNA NPH QL NAA+NON-PROBE: NOT DETECTED
HPIV1 RNA NPH QL NAA+NON-PROBE: NOT DETECTED
HPIV2 RNA NPH QL NAA+NON-PROBE: NOT DETECTED
HPIV3 RNA NPH QL NAA+NON-PROBE: NOT DETECTED
HPIV4 RNA NPH QL NAA+NON-PROBE: NOT DETECTED
IMM GRANULOCYTES # BLD: 0.03 E9/L
IMM GRANULOCYTES NFR BLD: 0.5 % (ref 0–5)
LYMPHOCYTES # BLD: 1.15 E9/L (ref 1.5–4)
LYMPHOCYTES NFR BLD: 17.3 % (ref 20–42)
M PNEUMO DNA NPH QL NAA+NON-PROBE: NOT DETECTED
MCH RBC QN AUTO: 30.5 PG (ref 26–35)
MCHC RBC AUTO-ENTMCNC: 33.2 % (ref 32–34.5)
MCV RBC AUTO: 92 FL (ref 80–99.9)
MONOCYTES # BLD: 0.76 E9/L (ref 0.1–0.95)
MONOCYTES NFR BLD: 11.4 % (ref 2–12)
NEUTROPHILS # BLD: 3.85 E9/L (ref 1.8–7.3)
NEUTS SEG NFR BLD: 57.9 % (ref 43–80)
PLATELET # BLD AUTO: 218 E9/L (ref 130–450)
PMV BLD AUTO: 11.1 FL (ref 7–12)
POTASSIUM SERPL-SCNC: 4.4 MMOL/L (ref 3.5–5)
PROT SERPL-MCNC: 6.7 G/DL (ref 6.4–8.3)
RBC # BLD AUTO: 4.65 E12/L (ref 3.5–5.5)
RSV RNA NPH QL NAA+NON-PROBE: NOT DETECTED
RV+EV RNA NPH QL NAA+NON-PROBE: NOT DETECTED
SARS-COV-2 RDRP RESP QL NAA+PROBE: NOT DETECTED
SARS-COV-2 RNA NPH QL NAA+NON-PROBE: NOT DETECTED
SODIUM SERPL-SCNC: 140 MMOL/L (ref 132–146)
TROPONIN, HIGH SENSITIVITY: 8 NG/L (ref 0–9)
TROPONIN, HIGH SENSITIVITY: 8 NG/L (ref 0–9)
WBC # BLD: 6.7 E9/L (ref 4.5–11.5)

## 2023-05-01 PROCEDURE — 94669 MECHANICAL CHEST WALL OSCILL: CPT

## 2023-05-01 PROCEDURE — 6370000000 HC RX 637 (ALT 250 FOR IP): Performed by: NURSE PRACTITIONER

## 2023-05-01 PROCEDURE — 96372 THER/PROPH/DIAG INJ SC/IM: CPT

## 2023-05-01 PROCEDURE — 6360000002 HC RX W HCPCS: Performed by: NURSE PRACTITIONER

## 2023-05-01 PROCEDURE — 84484 ASSAY OF TROPONIN QUANT: CPT

## 2023-05-01 PROCEDURE — 93010 ELECTROCARDIOGRAM REPORT: CPT | Performed by: INTERNAL MEDICINE

## 2023-05-01 PROCEDURE — 0202U NFCT DS 22 TRGT SARS-COV-2: CPT

## 2023-05-01 PROCEDURE — 99223 1ST HOSP IP/OBS HIGH 75: CPT | Performed by: NURSE PRACTITIONER

## 2023-05-01 PROCEDURE — 87635 SARS-COV-2 COVID-19 AMP PRB: CPT

## 2023-05-01 PROCEDURE — 71275 CT ANGIOGRAPHY CHEST: CPT

## 2023-05-01 PROCEDURE — 2580000003 HC RX 258: Performed by: NURSE PRACTITIONER

## 2023-05-01 PROCEDURE — 6360000002 HC RX W HCPCS

## 2023-05-01 PROCEDURE — 71045 X-RAY EXAM CHEST 1 VIEW: CPT

## 2023-05-01 PROCEDURE — 85378 FIBRIN DEGRADE SEMIQUANT: CPT

## 2023-05-01 PROCEDURE — 99223 1ST HOSP IP/OBS HIGH 75: CPT | Performed by: INTERNAL MEDICINE

## 2023-05-01 PROCEDURE — 93005 ELECTROCARDIOGRAM TRACING: CPT

## 2023-05-01 PROCEDURE — 96365 THER/PROPH/DIAG IV INF INIT: CPT

## 2023-05-01 PROCEDURE — 96375 TX/PRO/DX INJ NEW DRUG ADDON: CPT

## 2023-05-01 PROCEDURE — 99285 EMERGENCY DEPT VISIT HI MDM: CPT

## 2023-05-01 PROCEDURE — 96366 THER/PROPH/DIAG IV INF ADDON: CPT

## 2023-05-01 PROCEDURE — 2060000000 HC ICU INTERMEDIATE R&B

## 2023-05-01 PROCEDURE — 6370000000 HC RX 637 (ALT 250 FOR IP)

## 2023-05-01 PROCEDURE — 6370000000 HC RX 637 (ALT 250 FOR IP): Performed by: INTERNAL MEDICINE

## 2023-05-01 PROCEDURE — 94640 AIRWAY INHALATION TREATMENT: CPT

## 2023-05-01 PROCEDURE — 80053 COMPREHEN METABOLIC PANEL: CPT

## 2023-05-01 PROCEDURE — 94664 DEMO&/EVAL PT USE INHALER: CPT

## 2023-05-01 PROCEDURE — 83880 ASSAY OF NATRIURETIC PEPTIDE: CPT

## 2023-05-01 PROCEDURE — 2580000003 HC RX 258

## 2023-05-01 PROCEDURE — 6360000004 HC RX CONTRAST MEDICATION: Performed by: RADIOLOGY

## 2023-05-01 PROCEDURE — 85025 COMPLETE CBC W/AUTO DIFF WBC: CPT

## 2023-05-01 RX ORDER — IPRATROPIUM BROMIDE AND ALBUTEROL SULFATE 2.5; .5 MG/3ML; MG/3ML
1 SOLUTION RESPIRATORY (INHALATION)
Status: DISCONTINUED | OUTPATIENT
Start: 2023-05-01 | End: 2023-05-03 | Stop reason: HOSPADM

## 2023-05-01 RX ORDER — GUAIFENESIN/DEXTROMETHORPHAN 100-10MG/5
5 SYRUP ORAL EVERY 4 HOURS PRN
Status: DISCONTINUED | OUTPATIENT
Start: 2023-05-01 | End: 2023-05-02

## 2023-05-01 RX ORDER — SODIUM CHLORIDE 9 MG/ML
INJECTION, SOLUTION INTRAVENOUS PRN
Status: DISCONTINUED | OUTPATIENT
Start: 2023-05-01 | End: 2023-05-03 | Stop reason: HOSPADM

## 2023-05-01 RX ORDER — POLYETHYLENE GLYCOL 3350 17 G/17G
17 POWDER, FOR SOLUTION ORAL DAILY PRN
Status: DISCONTINUED | OUTPATIENT
Start: 2023-05-01 | End: 2023-05-03 | Stop reason: HOSPADM

## 2023-05-01 RX ORDER — IPRATROPIUM BROMIDE AND ALBUTEROL SULFATE 2.5; .5 MG/3ML; MG/3ML
3 SOLUTION RESPIRATORY (INHALATION) ONCE
Status: COMPLETED | OUTPATIENT
Start: 2023-05-01 | End: 2023-05-01

## 2023-05-01 RX ORDER — ACETAMINOPHEN 325 MG/1
650 TABLET ORAL EVERY 6 HOURS PRN
Status: DISCONTINUED | OUTPATIENT
Start: 2023-05-01 | End: 2023-05-03 | Stop reason: HOSPADM

## 2023-05-01 RX ORDER — LATANOPROST 50 UG/ML
1 SOLUTION/ DROPS OPHTHALMIC NIGHTLY
Status: DISCONTINUED | OUTPATIENT
Start: 2023-05-01 | End: 2023-05-03 | Stop reason: HOSPADM

## 2023-05-01 RX ORDER — WATER 1000 ML/1000ML
INJECTION, SOLUTION INTRAVENOUS
Status: COMPLETED
Start: 2023-05-01 | End: 2023-05-01

## 2023-05-01 RX ORDER — MAGNESIUM SULFATE IN WATER 40 MG/ML
2000 INJECTION, SOLUTION INTRAVENOUS ONCE
Status: COMPLETED | OUTPATIENT
Start: 2023-05-01 | End: 2023-05-01

## 2023-05-01 RX ORDER — ONDANSETRON 2 MG/ML
4 INJECTION INTRAMUSCULAR; INTRAVENOUS EVERY 6 HOURS PRN
Status: DISCONTINUED | OUTPATIENT
Start: 2023-05-01 | End: 2023-05-03 | Stop reason: HOSPADM

## 2023-05-01 RX ORDER — PREDNISONE 50 MG/1
50 TABLET ORAL DAILY
Qty: 5 TABLET | Refills: 0 | Status: SHIPPED | OUTPATIENT
Start: 2023-05-01 | End: 2023-05-06

## 2023-05-01 RX ORDER — ATORVASTATIN CALCIUM 20 MG/1
20 TABLET, FILM COATED ORAL DAILY
Status: DISCONTINUED | OUTPATIENT
Start: 2023-05-01 | End: 2023-05-03 | Stop reason: HOSPADM

## 2023-05-01 RX ORDER — METHYLPREDNISOLONE SODIUM SUCCINATE 125 MG/2ML
125 INJECTION, POWDER, LYOPHILIZED, FOR SOLUTION INTRAMUSCULAR; INTRAVENOUS ONCE
Status: COMPLETED | OUTPATIENT
Start: 2023-05-01 | End: 2023-05-01

## 2023-05-01 RX ORDER — ENOXAPARIN SODIUM 100 MG/ML
40 INJECTION SUBCUTANEOUS DAILY
Status: DISCONTINUED | OUTPATIENT
Start: 2023-05-01 | End: 2023-05-03 | Stop reason: HOSPADM

## 2023-05-01 RX ORDER — BUDESONIDE 0.5 MG/2ML
0.5 INHALANT ORAL
Status: DISCONTINUED | OUTPATIENT
Start: 2023-05-01 | End: 2023-05-03 | Stop reason: HOSPADM

## 2023-05-01 RX ORDER — ACETAMINOPHEN 650 MG/1
650 SUPPOSITORY RECTAL EVERY 6 HOURS PRN
Status: DISCONTINUED | OUTPATIENT
Start: 2023-05-01 | End: 2023-05-03 | Stop reason: HOSPADM

## 2023-05-01 RX ORDER — METHYLPREDNISOLONE SODIUM SUCCINATE 40 MG/ML
40 INJECTION, POWDER, LYOPHILIZED, FOR SOLUTION INTRAMUSCULAR; INTRAVENOUS EVERY 8 HOURS
Status: DISCONTINUED | OUTPATIENT
Start: 2023-05-01 | End: 2023-05-02

## 2023-05-01 RX ORDER — DORZOLAMIDE HCL 20 MG/ML
1 SOLUTION/ DROPS OPHTHALMIC 3 TIMES DAILY
Status: DISCONTINUED | OUTPATIENT
Start: 2023-05-01 | End: 2023-05-03 | Stop reason: HOSPADM

## 2023-05-01 RX ORDER — ONDANSETRON 4 MG/1
4 TABLET, ORALLY DISINTEGRATING ORAL EVERY 8 HOURS PRN
Status: DISCONTINUED | OUTPATIENT
Start: 2023-05-01 | End: 2023-05-03 | Stop reason: HOSPADM

## 2023-05-01 RX ORDER — SODIUM CHLORIDE 0.9 % (FLUSH) 0.9 %
5-40 SYRINGE (ML) INJECTION PRN
Status: DISCONTINUED | OUTPATIENT
Start: 2023-05-01 | End: 2023-05-03 | Stop reason: HOSPADM

## 2023-05-01 RX ORDER — SODIUM CHLORIDE 0.9 % (FLUSH) 0.9 %
5-40 SYRINGE (ML) INJECTION EVERY 12 HOURS SCHEDULED
Status: DISCONTINUED | OUTPATIENT
Start: 2023-05-01 | End: 2023-05-03 | Stop reason: HOSPADM

## 2023-05-01 RX ORDER — ARFORMOTEROL TARTRATE 15 UG/2ML
15 SOLUTION RESPIRATORY (INHALATION)
Status: DISCONTINUED | OUTPATIENT
Start: 2023-05-01 | End: 2023-05-03 | Stop reason: HOSPADM

## 2023-05-01 RX ADMIN — IPRATROPIUM BROMIDE AND ALBUTEROL SULFATE 1 AMPULE: .5; 2.5 SOLUTION RESPIRATORY (INHALATION) at 16:33

## 2023-05-01 RX ADMIN — WATER 1 ML: 1 INJECTION INTRAMUSCULAR; INTRAVENOUS; SUBCUTANEOUS at 23:16

## 2023-05-01 RX ADMIN — ARFORMOTEROL TARTRATE 15 MCG: 15 SOLUTION RESPIRATORY (INHALATION) at 19:48

## 2023-05-01 RX ADMIN — METHYLPREDNISOLONE SODIUM SUCCINATE 40 MG: 40 INJECTION, POWDER, LYOPHILIZED, FOR SOLUTION INTRAMUSCULAR; INTRAVENOUS at 18:44

## 2023-05-01 RX ADMIN — BUDESONIDE 500 MCG: 0.5 SUSPENSION RESPIRATORY (INHALATION) at 19:48

## 2023-05-01 RX ADMIN — IOPAMIDOL 75 ML: 755 INJECTION, SOLUTION INTRAVENOUS at 09:43

## 2023-05-01 RX ADMIN — IPRATROPIUM BROMIDE AND ALBUTEROL SULFATE 1 AMPULE: .5; 2.5 SOLUTION RESPIRATORY (INHALATION) at 19:48

## 2023-05-01 RX ADMIN — MAGNESIUM SULFATE HEPTAHYDRATE 2000 MG: 40 INJECTION, SOLUTION INTRAVENOUS at 08:31

## 2023-05-01 RX ADMIN — METHYLPREDNISOLONE SODIUM SUCCINATE 40 MG: 40 INJECTION, POWDER, LYOPHILIZED, FOR SOLUTION INTRAMUSCULAR; INTRAVENOUS at 23:16

## 2023-05-01 RX ADMIN — SODIUM CHLORIDE, PRESERVATIVE FREE 10 ML: 5 INJECTION INTRAVENOUS at 20:17

## 2023-05-01 RX ADMIN — LEVOTHYROXINE SODIUM 125 MCG: 25 TABLET ORAL at 18:43

## 2023-05-01 RX ADMIN — ENOXAPARIN SODIUM 40 MG: 100 INJECTION SUBCUTANEOUS at 18:44

## 2023-05-01 RX ADMIN — LATANOPROST 1 DROP: 50 SOLUTION OPHTHALMIC at 20:53

## 2023-05-01 RX ADMIN — IPRATROPIUM BROMIDE AND ALBUTEROL SULFATE 3 AMPULE: 2.5; .5 SOLUTION RESPIRATORY (INHALATION) at 08:32

## 2023-05-01 RX ADMIN — GUAIFENESIN AND DEXTROMETHORPHAN 5 ML: 100; 10 SYRUP ORAL at 18:43

## 2023-05-01 RX ADMIN — METHYLPREDNISOLONE SODIUM SUCCINATE 125 MG: 125 INJECTION, POWDER, FOR SOLUTION INTRAMUSCULAR; INTRAVENOUS at 08:28

## 2023-05-01 RX ADMIN — DORZOLAMIDE HYDROCHLORIDE 1 DROP: 20 SOLUTION/ DROPS OPHTHALMIC at 20:53

## 2023-05-01 ASSESSMENT — PAIN - FUNCTIONAL ASSESSMENT
PAIN_FUNCTIONAL_ASSESSMENT: NONE - DENIES PAIN

## 2023-05-01 ASSESSMENT — ENCOUNTER SYMPTOMS
DIARRHEA: 0
VOICE CHANGE: 0
VOMITING: 0
NAUSEA: 0
WHEEZING: 0
ABDOMINAL PAIN: 0
TROUBLE SWALLOWING: 0
PHOTOPHOBIA: 0
SHORTNESS OF BREATH: 1

## 2023-05-01 NOTE — ED NOTES
ED to Inpatient Handoff Report    Notified 6W that electronic handoff available and patient ready for transport to room 616. Safety Risks: Risk of falls    Patient in Restraints: no    Constant Observer or Patient : no    Telemetry Monitoring Ordered :Yes      Cardiac Rhythm: Sinus rhythm    Order to transfer to unit without monitor:YES    Last MEWS: 1 Time completed: 1241    Vitals:    05/01/23 0910 05/01/23 1010 05/01/23 1143 05/01/23 1241   BP: (!) 160/72 (!) 151/72 (!) 164/94 (!) 143/79   Pulse: 98 96 95 96   Resp: 21 24 18 20   Temp:    99 °F (37.2 °C)   TempSrc:    Oral   SpO2: 96% 97% 95% 94%   Weight:       Height:           Opportunity for questions and clarification was provided.         Regi Mancuso RN  05/01/23 124

## 2023-05-01 NOTE — ED PROVIDER NOTES
Chief Complaint   Patient presents with    Shortness of Breath       Review of Systems   Constitutional:  Negative for chills, fatigue and fever. HENT:  Negative for trouble swallowing and voice change. Eyes:  Negative for photophobia and visual disturbance. Respiratory:  Positive for shortness of breath. Negative for wheezing. Cardiovascular:  Negative for chest pain and palpitations. Gastrointestinal:  Negative for abdominal pain, diarrhea, nausea and vomiting. Musculoskeletal:  Negative for arthralgias, neck pain and neck stiffness. Skin:  Negative for wound. Neurological:  Negative for dizziness, syncope and headaches. Psychiatric/Behavioral:  Negative for behavioral problems and confusion. The patient is nervous/anxious. Physical Exam  Vitals reviewed. Constitutional:       General: She is not in acute distress. Appearance: Normal appearance. She is not diaphoretic. HENT:      Head: Normocephalic. Right Ear: External ear normal.      Left Ear: External ear normal.      Nose: Nose normal.      Mouth/Throat:      Pharynx: No oropharyngeal exudate. Eyes:      General:         Right eye: No discharge. Left eye: No discharge. Conjunctiva/sclera: Conjunctivae normal.      Pupils: Pupils are equal, round, and reactive to light. Cardiovascular:      Rate and Rhythm: Normal rate and regular rhythm. Heart sounds: No friction rub. Pulmonary:      Effort: Pulmonary effort is normal. No respiratory distress. Breath sounds: No stridor. Decreased breath sounds and wheezing present. Abdominal:      General: There is no distension. Tenderness: There is no abdominal tenderness. There is no guarding or rebound. Musculoskeletal:         General: No tenderness or deformity. Cervical back: Normal range of motion and neck supple. No rigidity or tenderness. Skin:     General: Skin is warm. Coloration: Skin is not jaundiced.    Neurological:
4/17/23, recent new diagnosis of DM. Chart Review/External Note Review    Last Echo reviewed by Me:  Lab Results   Component Value Date    LVEF 78 08/22/2021       Controlled Substance Monitoring:    Acute and Chronic Pain Monitoring:   No flowsheet data found. REVIEW OF SYSTEMS :      Positives and Pertinent negatives as per HPI.      SURGICAL HISTORY     Past Surgical History:   Procedure Laterality Date    APPENDECTOMY      BREAST LUMPECTOMY  9/25/2012    Right    BREAST SURGERY      left breast > benign 40 + years ago    COLONOSCOPY  8/21/2013    COLONOSCOPY  [id number]    EYE SURGERY      HYSTERECTOMY (CERVIX STATUS UNKNOWN)      CARMELITA    OTHER SURGICAL HISTORY  11/1/12    r needle localized axillary sentinel lymph node exision       CURRENTMEDICATIONS       Discharge Medication List as of 5/3/2023  3:13 PM        CONTINUE these medications which have NOT CHANGED    Details   simvastatin (ZOCOR) 40 MG tablet Take 1 tablet by mouth nightly, Disp-90 tablet, R-1Normal      albuterol sulfate HFA (PROAIR HFA) 108 (90 Base) MCG/ACT inhaler Inhale 2 puffs into the lungs every 6 hours as needed for Wheezing, Disp-1 each, R-6Normal      metFORMIN (GLUCOPHAGE) 500 MG tablet Take 1 tablet by mouth daily (with breakfast), Disp-90 tablet, R-0Normal      Handicap Placard MISC Starting Mon 4/17/2023, Disp-1 each, R-0, Print4/28      levothyroxine (SYNTHROID) 125 MCG tablet Take 1 tablet by mouth daily, Disp-90 tablet, R-1Normal      albuterol (PROVENTIL) (2.5 MG/3ML) 0.083% nebulizer solution Take 3 mLs by nebulization every 6 hours as needed for Wheezing, Disp-120 each, R-3Normal      fluticasone-umeclidin-vilant (TRELEGY ELLIPTA) 100-62.5-25 MCG/INH AEPB Inhale 1 puff into the lungs daily, Disp-3 each, R-3Print      dorzolamide (TRUSOPT) 2 % ophthalmic solution INSTILL 1 DROP INTO EACH EYE 3 TIMES A DAYHistorical Med      triamcinolone (NASACORT) 55 MCG/ACT nasal inhaler 2 sprays by Each Nostril route

## 2023-05-01 NOTE — ED NOTES
ED TO INPATIENT SBAR HANDOFF    Patient Name: Nate Mullen   :  1940  78 y.o. Preferred Name    Family/Caregiver Present no   Restraints no   C-SSRS: Risk of Suicide: No Risk  Sitter no   Sepsis Risk Score Sepsis Risk Score: 2.13    Situation  Chief Complaint   Patient presents with    Shortness of Breath     Brief Description of Patient's Condition:   Mental Status: oriented  Arrived from: home    Imaging:   CTA PULMONARY W CONTRAST   Final Result   No CT evidence for pulmonary embolus. No CT evidence for acute pulmonary   process. There is an endobronchial nodule or mucous plugging in 2 areas of the right   lower lobe which are progressed from prior exam.  The largest measures 7.2   mm. Consider follow-up CT scan of the chest after vigorous coughing which   could be performed in 3 months. Mild fatty infiltration of the liver. XR CHEST PORTABLE   Final Result   No acute process.            Abnormal labs:   Abnormal Labs Reviewed   CBC WITH AUTO DIFFERENTIAL - Abnormal; Notable for the following components:       Result Value    Lymphocytes % 17.3 (*)     Eosinophils % 12.0 (*)     Lymphocytes Absolute 1.15 (*)     Eosinophils Absolute 0.80 (*)     All other components within normal limits   COMPREHENSIVE METABOLIC PANEL - Abnormal; Notable for the following components:    Glucose 176 (*)     All other components within normal limits       Background  History:   Past Medical History:   Diagnosis Date    Anesthesia     wakes up very slowly and has lasting drowsiness    Breast cancer ([address]     lt breast 2012    COPD with exacerbation ([facility] 2015    Glaucoma     History of colon polyps     History of therapeutic radiation     Hx antineoplastic chemo     Hyperlipidemia     Hypothyroidism     Osteoarthritis     Pneumonia     Type 2 diabetes mellitus 2023       Assessment    Vitals/MEWS: MEWS Score: 1  Level of Consciousness: Alert (0)   Vitals:    23 [id number] 23 0910

## 2023-05-01 NOTE — H&P
[facility]   History and Physical      CHIEF COMPLAINT:  SOB    History of Present Illness:  78 y.o. female with a history of  COPD, Glaucoma, Hypothyroid, HLD, DM and Right Breast CA, Adenocarcinoma left lung S/P VATS LUlobectomy 9/2017. presents with increased SOB. Pt complaint is moderate in severity, persistent, worsened with exertion. Pt states about 2 weeks prior to presentation she was treated for URi with Doxycycline. She continues to have productive cough with grey sputum and worsening SOB. Follows with Dr. Pinky Jeffers Pulmonology. She denies fevers, chills, N/V/D, CP. Pt received Mg+Sulfate 2g IV/Solumedrol 125mg IV/Duoneb in ED course. Decision to admit pt for further evaluation and treatment. 4/17 Visit with PCP Acute bronchitis. Prescribed Doxycycline 100mg BID x 10 days and tessalon Perles. Informant(s) for H&P:Pt and EMR    REVIEW OF SYSTEMS:  no fevers, chills, cp, sob, n/v, ha, vision/hearing changes, wt changes, hot/cold flashes, other open skin lesions, diarrhea, constipation, dysuria/hematuria unless noted in HPI. Complete ROS performed with the patient and is otherwise negative.       PMH:  Past Medical History:   Diagnosis Date    Anesthesia     wakes up very slowly and has lasting drowsiness    Breast cancer ([address]     lt breast 2012    COPD with exacerbation ([address] 11/14/2015    Glaucoma     History of colon polyps     History of therapeutic radiation     Hx antineoplastic chemo     Hyperlipidemia     Hypothyroidism     Osteoarthritis     Pneumonia     Type 2 diabetes mellitus 4/17/2023       Surgical History:  Past Surgical History:   Procedure Laterality Date    APPENDECTOMY      BREAST LUMPECTOMY  9/25/2012    Right    BREAST SURGERY      left breast > benign 40 + years ago    COLONOSCOPY  8/21/2013    COLONOSCOPY  [id number]    EYE SURGERY      HYSTERECTOMY (CERVIX STATUS UNKNOWN)      CARMELITA    OTHER SURGICAL HISTORY  11/1/12    r needle localized

## 2023-05-01 NOTE — ED NOTES
The PT is trial ambulated while monitoring her pulse/ox. She does report some exertional dyspnea while ambulating. She is unsteady and requires some assistance. She reports she normally ambulates well.   Her O2% remains consistent at 95% with a pulse of 98 BPM.     Alhaji Carreon RN  05/01/23 1748

## 2023-05-02 LAB
BASOPHILS # BLD: 0.01 E9/L (ref 0–0.2)
BASOPHILS NFR BLD: 0.2 % (ref 0–2)
CRP SERPL HS-MCNC: <0.3 MG/DL (ref 0–0.4)
EOSINOPHIL # BLD: 0 E9/L (ref 0.05–0.5)
EOSINOPHIL NFR BLD: 0 % (ref 0–6)
ERYTHROCYTE [DISTWIDTH] IN BLOOD BY AUTOMATED COUNT: 12.1 FL (ref 11.5–15)
HCT VFR BLD AUTO: 38.8 % (ref 34–48)
HGB BLD-MCNC: 13.1 G/DL (ref 11.5–15.5)
IMM GRANULOCYTES # BLD: 0.05 E9/L
IMM GRANULOCYTES NFR BLD: 1 % (ref 0–5)
LYMPHOCYTES # BLD: 0.7 E9/L (ref 1.5–4)
LYMPHOCYTES NFR BLD: 14.5 % (ref 20–42)
MCH RBC QN AUTO: 30.5 PG (ref 26–35)
MCHC RBC AUTO-ENTMCNC: 33.8 % (ref 32–34.5)
MCV RBC AUTO: 90.2 FL (ref 80–99.9)
MONOCYTES # BLD: 0.19 E9/L (ref 0.1–0.95)
MONOCYTES NFR BLD: 3.9 % (ref 2–12)
NEUTROPHILS # BLD: 3.89 E9/L (ref 1.8–7.3)
NEUTS SEG NFR BLD: 80.4 % (ref 43–80)
PLATELET # BLD AUTO: 200 E9/L (ref 130–450)
PMV BLD AUTO: 11.5 FL (ref 7–12)
PROCALCITONIN: 0.07 NG/ML (ref 0–0.08)
RBC # BLD AUTO: 4.3 E12/L (ref 3.5–5.5)
WBC # BLD: 4.8 E9/L (ref 4.5–11.5)

## 2023-05-02 PROCEDURE — 96372 THER/PROPH/DIAG INJ SC/IM: CPT

## 2023-05-02 PROCEDURE — 6370000000 HC RX 637 (ALT 250 FOR IP): Performed by: NURSE PRACTITIONER

## 2023-05-02 PROCEDURE — 84145 PROCALCITONIN (PCT): CPT

## 2023-05-02 PROCEDURE — 36415 COLL VENOUS BLD VENIPUNCTURE: CPT

## 2023-05-02 PROCEDURE — 96376 TX/PRO/DX INJ SAME DRUG ADON: CPT

## 2023-05-02 PROCEDURE — 2580000003 HC RX 258

## 2023-05-02 PROCEDURE — 6360000002 HC RX W HCPCS: Performed by: NURSE PRACTITIONER

## 2023-05-02 PROCEDURE — 99232 SBSQ HOSP IP/OBS MODERATE 35: CPT | Performed by: INTERNAL MEDICINE

## 2023-05-02 PROCEDURE — 94669 MECHANICAL CHEST WALL OSCILL: CPT

## 2023-05-02 PROCEDURE — 87449 NOS EACH ORGANISM AG IA: CPT

## 2023-05-02 PROCEDURE — 86140 C-REACTIVE PROTEIN: CPT

## 2023-05-02 PROCEDURE — 87070 CULTURE OTHR SPECIMN AEROBIC: CPT

## 2023-05-02 PROCEDURE — 6370000000 HC RX 637 (ALT 250 FOR IP)

## 2023-05-02 PROCEDURE — 94640 AIRWAY INHALATION TREATMENT: CPT

## 2023-05-02 PROCEDURE — 2060000000 HC ICU INTERMEDIATE R&B

## 2023-05-02 PROCEDURE — 85025 COMPLETE CBC W/AUTO DIFF WBC: CPT

## 2023-05-02 PROCEDURE — 87206 SMEAR FLUORESCENT/ACID STAI: CPT

## 2023-05-02 PROCEDURE — 2580000003 HC RX 258: Performed by: NURSE PRACTITIONER

## 2023-05-02 RX ORDER — PREDNISONE 20 MG/1
40 TABLET ORAL DAILY
Status: DISCONTINUED | OUTPATIENT
Start: 2023-05-03 | End: 2023-05-03 | Stop reason: HOSPADM

## 2023-05-02 RX ORDER — GUAIFENESIN 400 MG/1
400 TABLET ORAL 3 TIMES DAILY
Status: DISCONTINUED | OUTPATIENT
Start: 2023-05-02 | End: 2023-05-03 | Stop reason: HOSPADM

## 2023-05-02 RX ORDER — WATER 1000 ML/1000ML
INJECTION, SOLUTION INTRAVENOUS
Status: COMPLETED
Start: 2023-05-02 | End: 2023-05-02

## 2023-05-02 RX ORDER — PANTOPRAZOLE SODIUM 40 MG/1
40 TABLET, DELAYED RELEASE ORAL NIGHTLY
Status: DISCONTINUED | OUTPATIENT
Start: 2023-05-02 | End: 2023-05-03 | Stop reason: HOSPADM

## 2023-05-02 RX ORDER — METHYLPREDNISOLONE SODIUM SUCCINATE 40 MG/ML
40 INJECTION, POWDER, LYOPHILIZED, FOR SOLUTION INTRAMUSCULAR; INTRAVENOUS EVERY 12 HOURS
Status: COMPLETED | OUTPATIENT
Start: 2023-05-02 | End: 2023-05-02

## 2023-05-02 RX ORDER — ACETYLCYSTEINE 100 MG/ML
4 SOLUTION ORAL; RESPIRATORY (INHALATION)
Status: DISCONTINUED | OUTPATIENT
Start: 2023-05-02 | End: 2023-05-03

## 2023-05-02 RX ADMIN — ENOXAPARIN SODIUM 40 MG: 100 INJECTION SUBCUTANEOUS at 09:40

## 2023-05-02 RX ADMIN — DORZOLAMIDE HYDROCHLORIDE 1 DROP: 20 SOLUTION/ DROPS OPHTHALMIC at 09:39

## 2023-05-02 RX ADMIN — IPRATROPIUM BROMIDE AND ALBUTEROL SULFATE 1 AMPULE: .5; 2.5 SOLUTION RESPIRATORY (INHALATION) at 19:33

## 2023-05-02 RX ADMIN — GUAIFENESIN 400 MG: 400 TABLET ORAL at 12:47

## 2023-05-02 RX ADMIN — METHYLPREDNISOLONE SODIUM SUCCINATE 40 MG: 40 INJECTION, POWDER, LYOPHILIZED, FOR SOLUTION INTRAMUSCULAR; INTRAVENOUS at 20:55

## 2023-05-02 RX ADMIN — IPRATROPIUM BROMIDE AND ALBUTEROL SULFATE 1 AMPULE: .5; 2.5 SOLUTION RESPIRATORY (INHALATION) at 12:25

## 2023-05-02 RX ADMIN — ARFORMOTEROL TARTRATE 15 MCG: 15 SOLUTION RESPIRATORY (INHALATION) at 19:33

## 2023-05-02 RX ADMIN — BUDESONIDE 500 MCG: 0.5 SUSPENSION RESPIRATORY (INHALATION) at 08:03

## 2023-05-02 RX ADMIN — GUAIFENESIN 400 MG: 400 TABLET ORAL at 16:07

## 2023-05-02 RX ADMIN — BUDESONIDE 500 MCG: 0.5 SUSPENSION RESPIRATORY (INHALATION) at 19:33

## 2023-05-02 RX ADMIN — ACETYLCYSTEINE 400 MG: 100 INHALANT RESPIRATORY (INHALATION) at 19:33

## 2023-05-02 RX ADMIN — IPRATROPIUM BROMIDE AND ALBUTEROL SULFATE 1 AMPULE: .5; 2.5 SOLUTION RESPIRATORY (INHALATION) at 15:46

## 2023-05-02 RX ADMIN — DORZOLAMIDE HYDROCHLORIDE 1 DROP: 20 SOLUTION/ DROPS OPHTHALMIC at 16:07

## 2023-05-02 RX ADMIN — SODIUM CHLORIDE, PRESERVATIVE FREE 10 ML: 5 INJECTION INTRAVENOUS at 20:55

## 2023-05-02 RX ADMIN — DORZOLAMIDE HYDROCHLORIDE 1 DROP: 20 SOLUTION/ DROPS OPHTHALMIC at 20:55

## 2023-05-02 RX ADMIN — ATORVASTATIN CALCIUM 20 MG: 20 TABLET, FILM COATED ORAL at 09:38

## 2023-05-02 RX ADMIN — IPRATROPIUM BROMIDE AND ALBUTEROL SULFATE 1 AMPULE: .5; 2.5 SOLUTION RESPIRATORY (INHALATION) at 08:02

## 2023-05-02 RX ADMIN — SODIUM CHLORIDE, PRESERVATIVE FREE 10 ML: 5 INJECTION INTRAVENOUS at 09:39

## 2023-05-02 RX ADMIN — LATANOPROST 1 DROP: 50 SOLUTION OPHTHALMIC at 20:56

## 2023-05-02 RX ADMIN — PANTOPRAZOLE SODIUM 40 MG: 40 TABLET, DELAYED RELEASE ORAL at 21:22

## 2023-05-02 RX ADMIN — ARFORMOTEROL TARTRATE 15 MCG: 15 SOLUTION RESPIRATORY (INHALATION) at 08:03

## 2023-05-02 RX ADMIN — LEVOTHYROXINE SODIUM 125 MCG: 25 TABLET ORAL at 09:38

## 2023-05-02 RX ADMIN — GUAIFENESIN 400 MG: 400 TABLET ORAL at 20:55

## 2023-05-02 RX ADMIN — METHYLPREDNISOLONE SODIUM SUCCINATE 40 MG: 40 INJECTION, POWDER, LYOPHILIZED, FOR SOLUTION INTRAMUSCULAR; INTRAVENOUS at 09:39

## 2023-05-02 RX ADMIN — WATER 10 ML: 1 INJECTION INTRAMUSCULAR; INTRAVENOUS; SUBCUTANEOUS at 09:39

## 2023-05-02 ASSESSMENT — PAIN SCALES - GENERAL: PAINLEVEL_OUTOF10: 0

## 2023-05-02 NOTE — ACP (ADVANCE CARE PLANNING)
Advance Care Planning   Healthcare Decision Maker:    Primary Decision Maker: Mitch Lovelldavid - Child - [phone]    Secondary Decision Maker: Joce Jamison - Child - [phone]      Today we documented Decision Maker(s) consistent with Legal Next of Kin hierarchy.

## 2023-05-02 NOTE — CONSULTS
Melany Marcos M.D.,[facility]  Homer Lagunas D.O., F.A.C.O.I., Ximena Burgess M.D. Alondra Reardon M.D. Anthony Roe D.O. Patient:  Analia Mcrae 78 y.o. female MRN: [id number]     Date of Service: 5/2/2023      PULMONARY CONSULTATION    Reason for Consultation: COPD with acute exacerbation  Referring Physician: Fe MURILLO    Communication with the referring physician will be sent via the electronic medical record. Chief Complaint: Shortness of breath    CODE STATUS: Full    SUBJECTIVE:  HPI:  Analia Mcrae is a 78 y.o. female who we are asked to evaluate for COPD with acute exacerbation. She has a past medical history significant for breast cancer 2012 treated with lumpectomy and radiation, COPD, prior nicotine dependence, prior pneumonia, diabetes mellitus type 2, allergic rhinitis, GERD, glaucoma,  hypothyroidism, HLD, prior appendectomy, hysterectomy, prior nicotine dependence 22.5 pack years quit approximately 18 years ago. She is a known patient to our practice followed by Dr. Anthony Roe for mild Gold stage I COPD. She has a history of lung cancer -stage Ib adenocarcinoma of left lung with history of VATS left upper lobectomy September 2017 at [facility]. Prior imaging from 2021 with abnormal groundglass opacities, nodules. Repeat CT chest August 2021 unrevealing, normal findings. She previously followed at [facility] for her pulmonary care but wants to stay local.  Her home pulmonary regimen includes Trelegy Ellipta daily and albuterol as needed for rescue. She has an NIV home ventilator astral settings AVAPS mode that she wears on a nightly basis. DME company is [employer]. Patient compliance report used 23 of 30 days-used 77%. She presented to the ED at [facility] on 5/1/2023 with increased shortness of breath, cough, and anxiety. Feeling increased fatigue. Patient received abx as OP  doxycycline and tessalon perles, prescribed by PCP for bronchitis. Oxygen at home 93% on RA.

## 2023-05-02 NOTE — CARE COORDINATION
Social work / Discharge Planning:          Social work met with patient for initial assessment and explained role. Patient lives with her daughter and uses no DME other than a chair lift. She used home care in the past but cannot recall which agency and had a past stay at [facility]. Patient is on room air and does not have home oxygen but does have a nebulizer and NIV from [facility]. Currently on IV solumedrol. Case Management Assessment  Initial Evaluation    Date/Time of Evaluation: 5/2/2023 10:39 AM  Assessment Completed by: STACI Clark    If patient is discharged prior to next notation, then this note serves as note for discharge by case management. Patient Name: Huy Day                   YOB: 1940  Diagnosis: COPD exacerbation ([address] [J44.1]                   Date / Time: 5/1/2023  7:48 AM    Patient Admission Status: Inpatient   Readmission Risk (Low < 19, Mod (19-27), High > 27): Readmission Risk Score: 7.6    Current PCP: Jaxson Daly MD  PCP verified by CM? Yes    Chart Reviewed: Yes      History Provided by: Patient  Patient Orientation: Alert and Oriented    Patient Cognition: Alert    Hospitalization in the last 30 days (Readmission):  No    If yes, Readmission Assessment in CM Navigator will be completed.     Advance Directives:      Code Status: Full Code   Patient's Primary Decision Maker is: Legal Next of Kin    Primary Decision MakerNaaman Boas - Child - [phone]    Secondary Decision Maker: Susannah Meneses - Child - [phone]    Discharge Planning:    Patient lives with: Children, Family Members Type of Home: House  Primary Care Giver: Self  Patient Support Systems include: Children, Family Members   Current Financial resources:    Current community resources:    Current services prior to admission: Home Care ([facility] supplies the NIV)            Current DME:              Type of Home Care services:  None    ADLS  Prior

## 2023-05-02 NOTE — PLAN OF CARE
Problem: Discharge Planning  Goal: Discharge to home or other facility with appropriate resources  Outcome: Progressing     Problem: Safety - Adult  Goal: Free from fall injury  Outcome: Adequate for Discharge     Problem: Chronic Conditions and Co-morbidities  Goal: Patient's chronic conditions and co-morbidity symptoms are monitored and maintained or improved  Recent Flowsheet Documentation  Taken 5/2/2023 0930 by Vaishali Ramirez 34 - Patient's Chronic Conditions and Co-Morbidity Symptoms are Monitored and Maintained or Improved: Monitor and assess patient's chronic conditions and comorbid symptoms for stability, deterioration, or improvement

## 2023-05-03 VITALS
TEMPERATURE: 97.7 F | WEIGHT: 176.1 LBS | HEIGHT: 62 IN | RESPIRATION RATE: 20 BRPM | BODY MASS INDEX: 32.41 KG/M2 | SYSTOLIC BLOOD PRESSURE: 144 MMHG | OXYGEN SATURATION: 100 % | HEART RATE: 86 BPM | DIASTOLIC BLOOD PRESSURE: 75 MMHG

## 2023-05-03 LAB
ANION GAP SERPL CALCULATED.3IONS-SCNC: 9 MMOL/L (ref 7–16)
BASOPHILS # BLD: 0.01 E9/L (ref 0–0.2)
BASOPHILS NFR BLD: 0.1 % (ref 0–2)
BUN SERPL-MCNC: 18 MG/DL (ref 6–23)
CALCIUM SERPL-MCNC: 9.3 MG/DL (ref 8.6–10.2)
CHLORIDE SERPL-SCNC: 105 MMOL/L (ref 98–107)
CO2 SERPL-SCNC: 23 MMOL/L (ref 22–29)
CREAT SERPL-MCNC: 0.6 MG/DL (ref 0.5–1)
EOSINOPHIL # BLD: 0.01 E9/L (ref 0.05–0.5)
EOSINOPHIL NFR BLD: 0.1 % (ref 0–6)
ERYTHROCYTE [DISTWIDTH] IN BLOOD BY AUTOMATED COUNT: 12.3 FL (ref 11.5–15)
GLUCOSE SERPL-MCNC: 295 MG/DL (ref 74–99)
HCT VFR BLD AUTO: 38.6 % (ref 34–48)
HGB BLD-MCNC: 13 G/DL (ref 11.5–15.5)
IMM GRANULOCYTES # BLD: 0.05 E9/L
IMM GRANULOCYTES NFR BLD: 0.6 % (ref 0–5)
LEGIONELLA AG UR QL: NORMAL
LYMPHOCYTES # BLD: 0.65 E9/L (ref 1.5–4)
LYMPHOCYTES NFR BLD: 7.6 % (ref 20–42)
MCH RBC QN AUTO: 31 PG (ref 26–35)
MCHC RBC AUTO-ENTMCNC: 33.7 % (ref 32–34.5)
MCV RBC AUTO: 92.1 FL (ref 80–99.9)
MONOCYTES # BLD: 0.3 E9/L (ref 0.1–0.95)
MONOCYTES NFR BLD: 3.5 % (ref 2–12)
NEUTROPHILS # BLD: 7.49 E9/L (ref 1.8–7.3)
NEUTS SEG NFR BLD: 88.1 % (ref 43–80)
PLATELET # BLD AUTO: 195 E9/L (ref 130–450)
PMV BLD AUTO: 12.3 FL (ref 7–12)
POTASSIUM SERPL-SCNC: 4.9 MMOL/L (ref 3.5–5)
RBC # BLD AUTO: 4.19 E12/L (ref 3.5–5.5)
S PNEUM AG SPEC QL: NORMAL
SODIUM SERPL-SCNC: 137 MMOL/L (ref 132–146)
WBC # BLD: 8.5 E9/L (ref 4.5–11.5)

## 2023-05-03 PROCEDURE — 85025 COMPLETE CBC W/AUTO DIFF WBC: CPT

## 2023-05-03 PROCEDURE — 6370000000 HC RX 637 (ALT 250 FOR IP): Performed by: NURSE PRACTITIONER

## 2023-05-03 PROCEDURE — 36415 COLL VENOUS BLD VENIPUNCTURE: CPT

## 2023-05-03 PROCEDURE — 6360000002 HC RX W HCPCS: Performed by: NURSE PRACTITIONER

## 2023-05-03 PROCEDURE — 96372 THER/PROPH/DIAG INJ SC/IM: CPT

## 2023-05-03 PROCEDURE — 80048 BASIC METABOLIC PNL TOTAL CA: CPT

## 2023-05-03 PROCEDURE — 6360000002 HC RX W HCPCS: Performed by: INTERNAL MEDICINE

## 2023-05-03 PROCEDURE — 99239 HOSP IP/OBS DSCHRG MGMT >30: CPT | Performed by: INTERNAL MEDICINE

## 2023-05-03 PROCEDURE — 94640 AIRWAY INHALATION TREATMENT: CPT

## 2023-05-03 PROCEDURE — 2580000003 HC RX 258: Performed by: NURSE PRACTITIONER

## 2023-05-03 RX ORDER — ACETYLCYSTEINE 100 MG/ML
4 SOLUTION ORAL; RESPIRATORY (INHALATION) ONCE
Status: COMPLETED | OUTPATIENT
Start: 2023-05-03 | End: 2023-05-03

## 2023-05-03 RX ORDER — INSULIN LISPRO 100 [IU]/ML
0-4 INJECTION, SOLUTION INTRAVENOUS; SUBCUTANEOUS NIGHTLY
Status: DISCONTINUED | OUTPATIENT
Start: 2023-05-03 | End: 2023-05-03 | Stop reason: HOSPADM

## 2023-05-03 RX ORDER — BENZONATATE 200 MG/1
200 CAPSULE ORAL 3 TIMES DAILY PRN
Qty: 30 CAPSULE | Refills: 0 | Status: SHIPPED | OUTPATIENT
Start: 2023-05-03 | End: 2023-05-10

## 2023-05-03 RX ORDER — INSULIN LISPRO 100 [IU]/ML
0-4 INJECTION, SOLUTION INTRAVENOUS; SUBCUTANEOUS
Status: DISCONTINUED | OUTPATIENT
Start: 2023-05-03 | End: 2023-05-03 | Stop reason: HOSPADM

## 2023-05-03 RX ORDER — ACETYLCYSTEINE 200 MG/ML
600 SOLUTION ORAL; RESPIRATORY (INHALATION) ONCE
Status: DISCONTINUED | OUTPATIENT
Start: 2023-05-03 | End: 2023-05-03 | Stop reason: HOSPADM

## 2023-05-03 RX ORDER — DEXTROSE MONOHYDRATE 100 MG/ML
INJECTION, SOLUTION INTRAVENOUS CONTINUOUS PRN
Status: DISCONTINUED | OUTPATIENT
Start: 2023-05-03 | End: 2023-05-03 | Stop reason: HOSPADM

## 2023-05-03 RX ORDER — GUAIFENESIN 400 MG/1
400 TABLET ORAL 3 TIMES DAILY
Qty: 56 TABLET | Refills: 0 | Status: SHIPPED | OUTPATIENT
Start: 2023-05-03 | End: 2023-05-17 | Stop reason: ALTCHOICE

## 2023-05-03 RX ORDER — BENZONATATE 100 MG/1
200 CAPSULE ORAL 3 TIMES DAILY PRN
Status: DISCONTINUED | OUTPATIENT
Start: 2023-05-03 | End: 2023-05-03 | Stop reason: HOSPADM

## 2023-05-03 RX ADMIN — DORZOLAMIDE HYDROCHLORIDE 1 DROP: 20 SOLUTION/ DROPS OPHTHALMIC at 09:10

## 2023-05-03 RX ADMIN — ACETYLCYSTEINE 400 MG: 100 INHALANT RESPIRATORY (INHALATION) at 14:18

## 2023-05-03 RX ADMIN — IPRATROPIUM BROMIDE AND ALBUTEROL SULFATE 1 AMPULE: .5; 2.5 SOLUTION RESPIRATORY (INHALATION) at 12:52

## 2023-05-03 RX ADMIN — ARFORMOTEROL TARTRATE 15 MCG: 15 SOLUTION RESPIRATORY (INHALATION) at 09:30

## 2023-05-03 RX ADMIN — IPRATROPIUM BROMIDE AND ALBUTEROL SULFATE 1 AMPULE: .5; 2.5 SOLUTION RESPIRATORY (INHALATION) at 09:30

## 2023-05-03 RX ADMIN — ATORVASTATIN CALCIUM 20 MG: 20 TABLET, FILM COATED ORAL at 09:10

## 2023-05-03 RX ADMIN — LEVOTHYROXINE SODIUM 125 MCG: 25 TABLET ORAL at 09:10

## 2023-05-03 RX ADMIN — BUDESONIDE 500 MCG: 0.5 SUSPENSION RESPIRATORY (INHALATION) at 09:30

## 2023-05-03 RX ADMIN — PREDNISONE 40 MG: 20 TABLET ORAL at 09:10

## 2023-05-03 RX ADMIN — SODIUM CHLORIDE, PRESERVATIVE FREE 10 ML: 5 INJECTION INTRAVENOUS at 09:10

## 2023-05-03 RX ADMIN — GUAIFENESIN 400 MG: 400 TABLET ORAL at 09:10

## 2023-05-03 RX ADMIN — ACETYLCYSTEINE 400 MG: 100 INHALANT RESPIRATORY (INHALATION) at 09:31

## 2023-05-03 RX ADMIN — ENOXAPARIN SODIUM 40 MG: 100 INJECTION SUBCUTANEOUS at 09:11

## 2023-05-03 RX ADMIN — DORZOLAMIDE HYDROCHLORIDE 1 DROP: 20 SOLUTION/ DROPS OPHTHALMIC at 16:01

## 2023-05-03 ASSESSMENT — PAIN SCALES - GENERAL: PAINLEVEL_OUTOF10: 0

## 2023-05-03 NOTE — PROGRESS NOTES
Database initiated pharmacy and medications verified with the patient. She is A&O independent from home with daughter. She uses no assistive devices and is RA at baseline but wears a NIV through [employer] at night time. She is a NO RIGHT ARM. D/t lumpectomy.
[facility] Progress Note    Admitting Date and Time: 5/1/2023  7:48 AM  Admit Dx: COPD exacerbation ([facility] [J44.1]    Subjective:  Patient is being followed for COPD exacerbation ([facility] [J44.1]   Pt seen and examined this morning.   No acute events overnight  Sitting up in chair, in no acute distress  States she is feeling better today but gets a little winded when walking down the [place]aga    ROS: denies fever, chills, cp, sob, n/v, HA unless stated above.      guaiFENesin  400 mg Oral TID    methylPREDNISolone  40 mg IntraVENous Q12H    [START ON 5/3/2023] predniSONE  40 mg Oral Daily    acetylcysteine  4 mL Inhalation BID    dorzolamide  1 drop Both Eyes TID    latanoprost  1 drop Right Eye Nightly    levothyroxine  125 mcg Oral Daily    atorvastatin  20 mg Oral Daily    sodium chloride flush  5-40 mL IntraVENous 2 times per day    enoxaparin  40 mg SubCUTAneous Daily    ipratropium-albuterol  1 ampule Inhalation Q4H WA    arformoterol tartrate  15 mcg Nebulization BID    budesonide  0.5 mg Nebulization BID     sodium chloride flush, 5-40 mL, PRN  sodium chloride, , PRN  ondansetron, 4 mg, Q8H PRN   Or  ondansetron, 4 mg, Q6H PRN  polyethylene glycol, 17 g, Daily PRN  acetaminophen, 650 mg, Q6H PRN   Or  acetaminophen, 650 mg, Q6H PRN         Objective:    /86   Pulse 96   Temp 98.1 °F (36.7 °C) (Oral)   Resp 24   Ht 5' 2\" (1.575 m)   Wt 175 lb (79.4 kg)   SpO2 97%   BMI 32.01 kg/m²     General Appearance: alert and oriented to person, place and time and in no acute distress  Skin: warm and dry  Head: normocephalic and atraumatic  Eyes: pupils equal, round, and reactive to light, extraocular eye movements intact, conjunctivae normal  Neck: neck supple and non tender without mass   Pulmonary/Chest: clear to auscultation bilaterally- no wheezes, rales or rhonchi, normal air movement, no respiratory distress  Cardiovascular: normal rate, normal S1 and S2 and no carotid bruits  Abdomen:
Pulse ox was_98% on room air at rest.  Ambulated patient on room air. Oxygen saturation was __97__% on room air while ambulating. Recovery pulse ox was __98_% on room air.
05/03/2023 03:22 AM    MPV 12.3 05/03/2023 03:22 AM     Lab Results   Component Value Date/Time     05/03/2023 03:22 AM    K 4.9 05/03/2023 03:22 AM    K 4.5 08/20/2021 06:28 AM     05/03/2023 03:22 AM    CO2 23 05/03/2023 03:22 AM    BUN 18 05/03/2023 03:22 AM    CREATININE 0.6 05/03/2023 03:22 AM    LABALBU 4.4 05/01/2023 08:20 AM    CALCIUM 9.3 05/03/2023 03:22 AM    GFRAA >60 10/03/2022 08:51 AM    LABGLOM >60 05/03/2023 03:22 AM     Lab Results   Component Value Date/Time    PROTIME 11.4 08/30/2016 09:53 AM    INR 1.1 08/30/2016 09:53 AM     Recent Labs     05/01/23  0820   PROBNP 31     Recent Labs     05/02/23  1030   PROCAL 0.07     This SmartLink has not been configured with any valid records. Micro:  No results for input(s): CULTRESP in the last 72 hours. No results for input(s): LABGRAM in the last 72 hours. No results for input(s): LEGUR in the last 72 hours. Recent Labs     05/02/23  1311   STREPNEUMAGU Presumptive negative- suggests no current or recent  pneumococcal infection. Infection due to Strep pneumoniae cannot be  ruled out since the antigen present in the sample  may be below the detection limit of the test.  Normal Range:Presumptive Negative       Recent Labs     05/02/23  1311   LP1UAG Presumptive Negative -suggesting no recent or current infections  with Legionella pneumophila serogroup 1. Infection to Legionella cannot be ruled out since other serogroups  and species may cause infection, antigen may not be present in  early infection, or level of antigen may be below the  detection limit.   Normal Range: Presumptive Negative       Smear, Respiratory Few Polymorphonuclear leukocytes   Few Epithelial cells   Few Gram positive diplococci   Moderate Gram negative rods         Assessment:    Moderate Gold stage II COPD with acute exacerbation  Possible mucous plugging right lower lobe  Prior nicotine dependence 22.5 pack years in remission  History of adenocarcinoma of the

## 2023-05-03 NOTE — CARE COORDINATION
Social work / Discharge Planning:          Changed to oral steroids. Per IDR, discharge is anticipated today. Awaiting pulse ox testing.    Electronically signed by STACI Yousif on 5/3/2023 at 9:54 AM

## 2023-05-03 NOTE — DISCHARGE SUMMARY
[facility] Physician Discharge Summary       Saqib Farris MD  [address], [address]  [address]          5 [facility] Emergency Department  [address]  [address] [place] [address]  [phone]    If symptoms worsen    DO Sherry Boyer5 [facility] [phone]    Follow up in 2 week(s)  lung office      Activity level: As tolerated     Dispo: home      Condition on discharge: Stable     Patient ID:  Sanjuan Epley  [id number]  54 y.o.  1940    Admit date: 5/1/2023    Discharge date and time:  5/3/2023  2:16 PM    Admission Diagnoses: Principal Problem:    COPD exacerbation ([address]  Resolved Problems:    * No resolved hospital problems. *      Discharge Diagnoses: Principal Problem:    COPD exacerbation ([address]  Resolved Problems:    * No resolved hospital problems. *      Consults:  IP CONSULT TO PULMONOLOGY    Procedures: none    Hospital Course:   Patient Sanjuan Epley is a 78 y.o. presented with COPD exacerbation ([address] [J44.1]    Patient is an 78-year-old female who was admitted on 5/1 with COPD exacerbation. Pulmonology was consulted. Patient treatment with breathing treatments, IV Solu-Medrol, and antitussives. Patient's symptoms improved, ambulatory pulse ox negative, respiratory panel negative, strep pneumo and Legionella are negative as well, and she will be discharged home in stable condition with prednisone taper.     Discharge Exam:    General Appearance: alert and oriented to person, place and time and in no acute distress  Skin: warm and dry  Head: normocephalic and atraumatic  Eyes: pupils equal, round, and reactive to light, extraocular eye movements intact, conjunctivae normal  Neck: neck supple and non tender without mass   Pulmonary/Chest: clear to auscultation bilaterally- no wheezes, rales or rhonchi, normal air movement, no respiratory distress  Cardiovascular: normal rate, normal S1

## 2023-05-04 ENCOUNTER — CARE COORDINATION (OUTPATIENT)
Dept: CASE MANAGEMENT | Age: 83
End: 2023-05-04

## 2023-05-04 LAB
BACTERIA SPEC RESP CULT: NORMAL
SMEAR, RESPIRATORY: NORMAL

## 2023-05-04 NOTE — CARE COORDINATION
[facility] Care Transitions Initial Follow Up Call    Call within 2 business days of discharge: Yes    Patient Current Location:  Home: [address] [address]    Care Transition Nurse contacted the patient by telephone to perform post hospital discharge assessment. Verified name and  with patient as identifiers. Provided introduction to self, and explanation of the Care Transition Nurse role. Patient: Barbara Kanner Patient : 1940   MRN: [id number]  Reason for Admission: COPD Exacerbation  Discharge Date: 5/3/23 RARS: Readmission Risk Score: 7.7      Last Discharge [address]       Date Complaint Diagnosis Description Type Department Provider    23 Shortness of Breath COPD exacerbation [facility]) ED to Hosp-Admission (Discharged) (ADMITTED) TOMAS Pate MD; [facility],... Attempted to reach patient by phone for initial post hospital discharge follow up. CTN spoke briefly with the patient, Barbara Kanner. The patient is unable to accept the call at this time; patient's voice is hoarse. Patient agreed to attempt follow up call with this CTN tomorrow. Emotional support provided.        Coral Duke RN

## 2023-05-05 ENCOUNTER — TELEPHONE (OUTPATIENT)
Dept: FAMILY MEDICINE CLINIC | Age: 83
End: 2023-05-05

## 2023-05-05 ENCOUNTER — CARE COORDINATION (OUTPATIENT)
Dept: CASE MANAGEMENT | Age: 83
End: 2023-05-05

## 2023-05-05 NOTE — TELEPHONE ENCOUNTER
Care Transitions Initial Follow Up Call    Outreach made within 2 business days of discharge: Yes    Patient: Serina Daley Patient : 1940   MRN: [id number]  Reason for Admission: COPD  Discharge Date: 5/3/23       Spoke with: Left message with daughter to schedule a follow up appt, Amaris Larios RN

## 2023-05-05 NOTE — CARE COORDINATION
[facility] Care Transitions Initial Follow Up Call    Call within 2 business days of discharge: Yes    Patient Current Location:  Home: [facility] [place] [address]    Care Transition Nurse contacted the patient by telephone to perform post hospital discharge assessment. Verified name and  with patient as identifiers. Provided introduction to self, and explanation of the Care Transition Nurse role. Patient: Shayla Vaz Patient : 1940   MRN: [id number]  Reason for Admission: COPD Exacerbation  Discharge Date: 5/3/23 RARS: Readmission Risk Score: 7.7      Last Discharge [address]       Date Complaint Diagnosis Description Type Department Provider    23 Shortness of Breath COPD exacerbation [facility]) ED to Hosp-Admission (Discharged) (ADMITTED) [facility] Anderson MD; [facility],... Was this an external facility discharge? No Discharge Facility: [facility]    Challenges to be reviewed by the provider   Additional needs identified to be addressed with provider: No           Method of communication with provider: chart routing. Patient is pleasant in conversation and provides a brief update. Noted, hoarseness in voice is somewhat improved. -reports shortness of breath with exertion; reports symptoms are improving; SpO2 97% RA  -reports positive for non-productive cough   -utilizing NIV @ hs   -utilizing nebulizer as prescribed  -denies weakness or fatigue   -denies chest pain, palpitations, lightheaded, or dizziness   -denies any swelling   -reports good appetite   -reports normal bladder elimination   -reports occasional episodes of diarrhea   -no transportation issues   -able to afford cost of medications     Emotional support provided; discussed will continue to follow. Care Transition Nurse reviewed discharge instructions, medical action plan, and red flags with patient who verbalized understanding.  The patient was given an opportunity to ask questions and does not have

## 2023-05-05 NOTE — TELEPHONE ENCOUNTER
Spoke with patient and daughter to schedule the [facility] follow up. Appt scheduled for 5/10/23 at 3:30. Patient states she is feeling a little bit better today. Patient and daughter deny any needs or issues at this time.        Follow Up  Future Appointments   Date Time Provider Samira Chino   5/10/2023  3:30 PM Geena Chaudhry MD [facility] AND WOMEN'S [facility]   5/17/2023  9:30 AM LIDIA Basurto - CNP [facility]   11/29/2023  8:30 AM Geena Chaudhry MD [address], RN

## 2023-05-10 ENCOUNTER — OFFICE VISIT (OUTPATIENT)
Dept: FAMILY MEDICINE CLINIC | Age: 83
End: 2023-05-10

## 2023-05-10 VITALS
TEMPERATURE: 96.7 F | WEIGHT: 174.8 LBS | RESPIRATION RATE: 20 BRPM | BODY MASS INDEX: 31.97 KG/M2 | OXYGEN SATURATION: 96 % | SYSTOLIC BLOOD PRESSURE: 130 MMHG | DIASTOLIC BLOOD PRESSURE: 60 MMHG | HEART RATE: 85 BPM

## 2023-05-10 DIAGNOSIS — E11.65 TYPE 2 DIABETES MELLITUS WITH HYPERGLYCEMIA, WITHOUT LONG-TERM CURRENT USE OF INSULIN (HCC): ICD-10-CM

## 2023-05-10 DIAGNOSIS — J43.8 OTHER EMPHYSEMA (HCC): ICD-10-CM

## 2023-05-10 DIAGNOSIS — J44.1 COPD EXACERBATION (HCC): Primary | ICD-10-CM

## 2023-05-10 ASSESSMENT — PATIENT HEALTH QUESTIONNAIRE - PHQ9
SUM OF ALL RESPONSES TO PHQ QUESTIONS 1-9: 0
1. LITTLE INTEREST OR PLEASURE IN DOING THINGS: 0

## 2023-05-10 NOTE — PROGRESS NOTES
Post-Discharge Transitional Care  Follow Up      Peggy Hughes   YOB: 1940    Date of Office Visit:  5/10/2023  Date of Hospital Admission: 5/1/23  Date of Hospital Discharge: 5/3/23  Risk of hospital readmission (high >=14%. Medium >=10%) :Readmission Risk Score: 7.7      Care management risk score Rising risk (score 2-5) and Complex Care (Scores >=6): No Risk Score On File     Non face to face  following discharge, date last encounter closed (first attempt may have been earlier): 05/05/2023    Call initiated 2 business days of discharge: Yes    ASSESSMENT/PLAN:   COPD exacerbation (HCC)    Treated with O2 expectoant ct chest aerosols coughing   Follow u[  ct chest 3 mos    Other emphysema (HCC)on cpap at home with broncholdialtore    niv       Type 2 diabetes mellitus with hyperglycemia, without long-term current use of insulin (HCC)  Hemoglobin A1C   Date Value Ref Range Status   03/29/2023 9.3 (H) 4.0 - 5.6 % Final         On steroids and not well ocntrolled  Will asdjust meds and reche 2 mos  Reviewed diet and exercise    Medical Decision Making: moderate complexity  Return in about 2 months (around 7/10/2023). hannah    Subjective:   HPI:  Follow up of Hospital problems/diagnosis(es):   Reviewed labsand meds and notes from er an pulm  At home and    Inpatient course: Discharge summary reviewed- see chart. Was seen 4=17 23 with Anirudh Dimas where she stated the best she's felt in a long time . Then sudden change with sob and hypoxemia. Went to er and noted to have sob, roberts with p ox of 87%  ccame to er. Given O2 and ct master showed mucus plugging. History of adenocarcinoma of the left lung s/p VATS and ESTEFANI lobectomy in 2017   given solulmedrol and O2  1L   Her home pulmonary regimen includes Trelegy Ellipta daily and albuterol as needed for rescue.   She has an NIV home ventilator astral s    Treated with steroids, O2 and  bronchodilators and mucinex and fluids  Did cough up qite a bit of phlegm

## 2023-05-11 ENCOUNTER — TELEPHONE (OUTPATIENT)
Dept: FAMILY MEDICINE CLINIC | Age: 83
End: 2023-05-11

## 2023-05-11 ENCOUNTER — CARE COORDINATION (OUTPATIENT)
Dept: CASE MANAGEMENT | Age: 83
End: 2023-05-11

## 2023-05-11 RX ORDER — FLUCONAZOLE 100 MG/1
TABLET ORAL
Qty: 12 TABLET | Refills: 0 | Status: SHIPPED | OUTPATIENT
Start: 2023-05-11

## 2023-05-11 NOTE — TELEPHONE ENCOUNTER
Pt called was in office yesterday she talked about thrush and she said it is really bad today and wanted something called in for it.      Pharmacy: [facility] [place]

## 2023-05-11 NOTE — CARE COORDINATION
[facility] Care Transitions Follow Up Call    Patient Current Location:  [place] [address] [place] [address]    Care Transition Nurse contacted the patient by telephone to follow up after admission on 23. Verified name and  with patient as identifiers. Patient: Ivan Graham  Patient : 1940   MRN: [id number]  Reason for Admission: COPD exacerbation  Discharge Date: 5/3/23 RARS: Readmission Risk Score: 7.7      Needs to be reviewed by the provider   Additional needs identified to be addressed with provider: Yes  medications-Patient states having thrush since hospital discharge. Patient states she forgot to ask you for something yesterday when she was seen in office. Can you order something as she states her mouth is getting more sore? Patient uses [facility] [place]. CTN will continue to follow for Care Transition. Method of communication with provider: chart routing. Spoke with patient today 23 for TCM/hospital discharge follow up sub call for COPD exacerbation. Patient complains of ongoing shortness of breath with exertion which is improving. Denies being on home oxygen but gets relief from aerosols. States oxygen saturation while on phone with this CTN is 99% on R/A. Denies being a smoker. Patient states having an associated productive cough. States producing milky to clear phlegm. Confirmed with patient she continues on tapered dose of Prednisone. CTN advised to take Prednisone as directed until completely finished. Noted CTA obtained on admission showed the following below:     IMPRESSION:  No CT evidence for pulmonary embolus. No CT evidence for acute pulmonary  process. There is an endobronchial nodule or mucous plugging in 2 areas of the right  lower lobe which are progressed from prior exam.  The largest measures 7.2  mm. Consider follow-up CT scan of the chest after vigorous coughing which  could be performed in 3 months.      Mild fatty

## 2023-05-16 ENCOUNTER — TELEPHONE (OUTPATIENT)
Dept: FAMILY MEDICINE CLINIC | Age: 83
End: 2023-05-16

## 2023-05-16 NOTE — TELEPHONE ENCOUNTER
The patient called and left a message. She had been on a high dose of prednisone and taking Metformin to help control blood sugars. Yesterday she had a hypoglycemic episode -perspiring and feeling faint. She felt better after eating something. She is asking if she should stop the Metformin at this time? Called and spoke to the patient. We will let her know what to do with the Metformin. In the meantime, she denies any new hypoglycemic episodes. She was instructed to eat protein with her carbohydrate at meals and always carry something with her to have on hand to eat if she should become symptomatic again.

## 2023-05-18 ENCOUNTER — CARE COORDINATION (OUTPATIENT)
Dept: CASE MANAGEMENT | Age: 83
End: 2023-05-18

## 2023-05-18 NOTE — CARE COORDINATION
Provider Samira Chino   7/14/2023 11:30 AM MD ESTEFANIA Virk [facility] AND WOMEN'S [facility]   11/29/2023  8:30 AM MD ESTEFANIA Virk [facility]     Non-[facility] follow up appointment(s): Carmen Mccullough NP (pulmonology) 5/17/23. Care Transition Nurse reviewed discharge instructions, medical action plan, and red flags with patient and discussed any barriers to care and/or understanding of plan of care after discharge. Discussed appropriate site of care based on symptoms and resources available to patient including: PCP  Specialist  When to call 911  Condition related references. The patient agrees to contact the PCP office for questions related to their healthcare. Advance Care Planning:   reviewed and current. Patients top risk factors for readmission: medical condition-History of breast Cancer, COPD, Lung Cancer and s/p lobectomy    Offered patient enrollment in the Remote Patient Monitoring (RPM) program for in-home monitoring: Patient declined. Care Transitions Subsequent and Final Call    Subsequent and Final Calls  Do you have any ongoing symptoms?: Yes  Onset of Patient-reported symptoms: Other  Patient-reported symptoms: Cough, Shortness of Breath  Have your medications changed?: Yes  Patient Reports: CTN confirmed with patient Trina Win is finished that was ordered on hosp discharge. Pt states she started Diflucan ordered by PCP for thrush  Do you have any questions related to your medications?: No  Do you currently have any active services?: No  Do you have any needs or concerns that I can assist you with?: No  Identified Barriers: Lack of Education, Other  Care Transitions Interventions  No Identified Needs  Other Interventions:             Care Transition Nurse provided contact information for future needs. No further follow-up call indicated based on severity of symptoms and risk factors. Plan for next call:  CTN signing off for Care Transition.      Fracisco Floyd, APRN

## 2023-05-23 ENCOUNTER — TELEPHONE (OUTPATIENT)
Dept: OTHER | Facility: CLINIC | Age: 83
End: 2023-05-23

## 2023-05-23 ENCOUNTER — APPOINTMENT (OUTPATIENT)
Dept: CT IMAGING | Age: 83
End: 2023-05-23
Payer: MEDICARE

## 2023-05-23 ENCOUNTER — APPOINTMENT (OUTPATIENT)
Dept: GENERAL RADIOLOGY | Age: 83
End: 2023-05-23
Payer: MEDICARE

## 2023-05-23 ENCOUNTER — HOSPITAL ENCOUNTER (INPATIENT)
Age: 83
LOS: 3 days | Discharge: OTHER INSTITUTION | End: 2023-05-26
Attending: EMERGENCY MEDICINE | Admitting: STUDENT IN AN ORGANIZED HEALTH CARE EDUCATION/TRAINING PROGRAM
Payer: MEDICARE

## 2023-05-23 DIAGNOSIS — J44.9 CHRONIC OBSTRUCTIVE PULMONARY DISEASE, UNSPECIFIED COPD TYPE (HCC): ICD-10-CM

## 2023-05-23 DIAGNOSIS — J18.9 PNEUMONIA OF RIGHT LOWER LOBE DUE TO INFECTIOUS ORGANISM: Primary | ICD-10-CM

## 2023-05-23 DIAGNOSIS — T17.500A MUCUS PLUGGING OF BRONCHI: ICD-10-CM

## 2023-05-23 DIAGNOSIS — R06.89 DYSPNEA AND RESPIRATORY ABNORMALITIES: ICD-10-CM

## 2023-05-23 DIAGNOSIS — R06.00 DYSPNEA AND RESPIRATORY ABNORMALITIES: ICD-10-CM

## 2023-05-23 DIAGNOSIS — R53.1 GENERAL WEAKNESS: ICD-10-CM

## 2023-05-23 LAB
ALBUMIN SERPL-MCNC: 3.9 G/DL (ref 3.5–5.2)
ALP SERPL-CCNC: 92 U/L (ref 35–104)
ALT SERPL-CCNC: 39 U/L (ref 0–32)
ANION GAP SERPL CALCULATED.3IONS-SCNC: 12 MMOL/L (ref 7–16)
AST SERPL-CCNC: 28 U/L (ref 0–31)
B PARAP IS1001 DNA NPH QL NAA+NON-PROBE: NOT DETECTED
B PERT.PT PRMT NPH QL NAA+NON-PROBE: NOT DETECTED
BASOPHILS # BLD: 0.06 E9/L (ref 0–0.2)
BASOPHILS NFR BLD: 1 % (ref 0–2)
BILIRUB SERPL-MCNC: 0.2 MG/DL (ref 0–1.2)
BNP BLD-MCNC: 85 PG/ML (ref 0–450)
BUN SERPL-MCNC: 6 MG/DL (ref 6–23)
C PNEUM DNA NPH QL NAA+NON-PROBE: NOT DETECTED
CALCIUM SERPL-MCNC: 10 MG/DL (ref 8.6–10.2)
CHLORIDE SERPL-SCNC: 102 MMOL/L (ref 98–107)
CO2 SERPL-SCNC: 24 MMOL/L (ref 22–29)
CREAT SERPL-MCNC: 0.5 MG/DL (ref 0.5–1)
EKG ATRIAL RATE: 95 BPM
EKG P AXIS: 68 DEGREES
EKG P-R INTERVAL: 182 MS
EKG Q-T INTERVAL: 370 MS
EKG QRS DURATION: 134 MS
EKG QTC CALCULATION (BAZETT): 464 MS
EKG R AXIS: -72 DEGREES
EKG T AXIS: 35 DEGREES
EKG VENTRICULAR RATE: 95 BPM
EOSINOPHIL # BLD: 0.57 E9/L (ref 0.05–0.5)
EOSINOPHIL NFR BLD: 9.4 % (ref 0–6)
ERYTHROCYTE [DISTWIDTH] IN BLOOD BY AUTOMATED COUNT: 12.4 FL (ref 11.5–15)
FLUAV RNA NPH QL NAA+NON-PROBE: NOT DETECTED
FLUBV RNA NPH QL NAA+NON-PROBE: NOT DETECTED
GLUCOSE SERPL-MCNC: 189 MG/DL (ref 74–99)
HADV DNA NPH QL NAA+NON-PROBE: NOT DETECTED
HCOV 229E RNA NPH QL NAA+NON-PROBE: NOT DETECTED
HCOV HKU1 RNA NPH QL NAA+NON-PROBE: NOT DETECTED
HCOV NL63 RNA NPH QL NAA+NON-PROBE: NOT DETECTED
HCOV OC43 RNA NPH QL NAA+NON-PROBE: NOT DETECTED
HCT VFR BLD AUTO: 40.5 % (ref 34–48)
HGB BLD-MCNC: 13.6 G/DL (ref 11.5–15.5)
HMPV RNA NPH QL NAA+NON-PROBE: NOT DETECTED
HPIV1 RNA NPH QL NAA+NON-PROBE: NOT DETECTED
HPIV2 RNA NPH QL NAA+NON-PROBE: NOT DETECTED
HPIV3 RNA NPH QL NAA+NON-PROBE: NOT DETECTED
HPIV4 RNA NPH QL NAA+NON-PROBE: NOT DETECTED
IMM GRANULOCYTES # BLD: 0.05 E9/L
IMM GRANULOCYTES NFR BLD: 0.8 % (ref 0–5)
INR BLD: 1.1
LACTATE BLDV-SCNC: 2 MMOL/L (ref 0.5–2.2)
LACTATE BLDV-SCNC: 2.1 MMOL/L (ref 0.5–2.2)
LYMPHOCYTES # BLD: 1.09 E9/L (ref 1.5–4)
LYMPHOCYTES NFR BLD: 17.9 % (ref 20–42)
M PNEUMO DNA NPH QL NAA+NON-PROBE: NOT DETECTED
MAGNESIUM SERPL-MCNC: 1.8 MG/DL (ref 1.6–2.6)
MCH RBC QN AUTO: 30.8 PG (ref 26–35)
MCHC RBC AUTO-ENTMCNC: 33.6 % (ref 32–34.5)
MCV RBC AUTO: 91.6 FL (ref 80–99.9)
MONOCYTES # BLD: 0.52 E9/L (ref 0.1–0.95)
MONOCYTES NFR BLD: 8.6 % (ref 2–12)
NEUTROPHILS # BLD: 3.79 E9/L (ref 1.8–7.3)
NEUTS SEG NFR BLD: 62.3 % (ref 43–80)
PLATELET # BLD AUTO: 234 E9/L (ref 130–450)
PMV BLD AUTO: 11.6 FL (ref 7–12)
POTASSIUM SERPL-SCNC: 4.9 MMOL/L (ref 3.5–5)
PROCALCITONIN: 0.05 NG/ML (ref 0–0.08)
PROT SERPL-MCNC: 7.1 G/DL (ref 6.4–8.3)
PROTHROMBIN TIME: 12.7 SEC (ref 9.3–12.4)
RBC # BLD AUTO: 4.42 E12/L (ref 3.5–5.5)
REASON FOR REJECTION: NORMAL
REASON FOR REJECTION: NORMAL
REJECTED TEST: NORMAL
REJECTED TEST: NORMAL
RSV RNA NPH QL NAA+NON-PROBE: NOT DETECTED
RV+EV RNA NPH QL NAA+NON-PROBE: NOT DETECTED
SARS-COV-2 RNA NPH QL NAA+NON-PROBE: NOT DETECTED
SODIUM SERPL-SCNC: 138 MMOL/L (ref 132–146)
TROPONIN, HIGH SENSITIVITY: 9 NG/L (ref 0–9)
WBC # BLD: 6.1 E9/L (ref 4.5–11.5)

## 2023-05-23 PROCEDURE — 6370000000 HC RX 637 (ALT 250 FOR IP): Performed by: STUDENT IN AN ORGANIZED HEALTH CARE EDUCATION/TRAINING PROGRAM

## 2023-05-23 PROCEDURE — 94640 AIRWAY INHALATION TREATMENT: CPT

## 2023-05-23 PROCEDURE — 80053 COMPREHEN METABOLIC PANEL: CPT

## 2023-05-23 PROCEDURE — 71275 CT ANGIOGRAPHY CHEST: CPT

## 2023-05-23 PROCEDURE — 83880 ASSAY OF NATRIURETIC PEPTIDE: CPT

## 2023-05-23 PROCEDURE — 6360000004 HC RX CONTRAST MEDICATION: Performed by: RADIOLOGY

## 2023-05-23 PROCEDURE — 82962 GLUCOSE BLOOD TEST: CPT

## 2023-05-23 PROCEDURE — 36415 COLL VENOUS BLD VENIPUNCTURE: CPT

## 2023-05-23 PROCEDURE — 6360000002 HC RX W HCPCS: Performed by: STUDENT IN AN ORGANIZED HEALTH CARE EDUCATION/TRAINING PROGRAM

## 2023-05-23 PROCEDURE — 2580000003 HC RX 258: Performed by: RADIOLOGY

## 2023-05-23 PROCEDURE — 84145 PROCALCITONIN (PCT): CPT

## 2023-05-23 PROCEDURE — 99222 1ST HOSP IP/OBS MODERATE 55: CPT | Performed by: STUDENT IN AN ORGANIZED HEALTH CARE EDUCATION/TRAINING PROGRAM

## 2023-05-23 PROCEDURE — 71045 X-RAY EXAM CHEST 1 VIEW: CPT

## 2023-05-23 PROCEDURE — 84484 ASSAY OF TROPONIN QUANT: CPT

## 2023-05-23 PROCEDURE — 1200000000 HC SEMI PRIVATE

## 2023-05-23 PROCEDURE — APPSS60 APP SPLIT SHARED TIME 46-60 MINUTES: Performed by: NURSE PRACTITIONER

## 2023-05-23 PROCEDURE — 83605 ASSAY OF LACTIC ACID: CPT

## 2023-05-23 PROCEDURE — 6370000000 HC RX 637 (ALT 250 FOR IP): Performed by: EMERGENCY MEDICINE

## 2023-05-23 PROCEDURE — 93010 ELECTROCARDIOGRAM REPORT: CPT | Performed by: INTERNAL MEDICINE

## 2023-05-23 PROCEDURE — 85610 PROTHROMBIN TIME: CPT

## 2023-05-23 PROCEDURE — 93005 ELECTROCARDIOGRAM TRACING: CPT | Performed by: EMERGENCY MEDICINE

## 2023-05-23 PROCEDURE — 99285 EMERGENCY DEPT VISIT HI MDM: CPT

## 2023-05-23 PROCEDURE — 0202U NFCT DS 22 TRGT SARS-COV-2: CPT

## 2023-05-23 PROCEDURE — 85025 COMPLETE CBC W/AUTO DIFF WBC: CPT

## 2023-05-23 PROCEDURE — 94664 DEMO&/EVAL PT USE INHALER: CPT

## 2023-05-23 PROCEDURE — 96374 THER/PROPH/DIAG INJ IV PUSH: CPT

## 2023-05-23 PROCEDURE — 83735 ASSAY OF MAGNESIUM: CPT

## 2023-05-23 RX ORDER — ONDANSETRON 2 MG/ML
4 INJECTION INTRAMUSCULAR; INTRAVENOUS EVERY 6 HOURS PRN
Status: DISCONTINUED | OUTPATIENT
Start: 2023-05-23 | End: 2023-05-26 | Stop reason: HOSPADM

## 2023-05-23 RX ORDER — SODIUM CHLORIDE 0.9 % (FLUSH) 0.9 %
10 SYRINGE (ML) INJECTION PRN
Status: COMPLETED | OUTPATIENT
Start: 2023-05-23 | End: 2023-05-23

## 2023-05-23 RX ORDER — BENZONATATE 100 MG/1
100 CAPSULE ORAL ONCE
Status: COMPLETED | OUTPATIENT
Start: 2023-05-23 | End: 2023-05-23

## 2023-05-23 RX ORDER — SODIUM CHLORIDE 9 MG/ML
INJECTION, SOLUTION INTRAVENOUS PRN
Status: DISCONTINUED | OUTPATIENT
Start: 2023-05-23 | End: 2023-05-26 | Stop reason: HOSPADM

## 2023-05-23 RX ORDER — ENOXAPARIN SODIUM 100 MG/ML
40 INJECTION SUBCUTANEOUS DAILY
Status: DISCONTINUED | OUTPATIENT
Start: 2023-05-24 | End: 2023-05-26 | Stop reason: HOSPADM

## 2023-05-23 RX ORDER — PANTOPRAZOLE SODIUM 40 MG/1
40 TABLET, DELAYED RELEASE ORAL
Status: DISCONTINUED | OUTPATIENT
Start: 2023-05-24 | End: 2023-05-26 | Stop reason: HOSPADM

## 2023-05-23 RX ORDER — METHYLPREDNISOLONE SODIUM SUCCINATE 125 MG/2ML
60 INJECTION, POWDER, LYOPHILIZED, FOR SOLUTION INTRAMUSCULAR; INTRAVENOUS ONCE
Status: COMPLETED | OUTPATIENT
Start: 2023-05-23 | End: 2023-05-23

## 2023-05-23 RX ORDER — INSULIN LISPRO 100 [IU]/ML
0-4 INJECTION, SOLUTION INTRAVENOUS; SUBCUTANEOUS
Status: DISCONTINUED | OUTPATIENT
Start: 2023-05-24 | End: 2023-05-24

## 2023-05-23 RX ORDER — DORZOLAMIDE HCL 20 MG/ML
1 SOLUTION/ DROPS OPHTHALMIC 3 TIMES DAILY
Status: DISCONTINUED | OUTPATIENT
Start: 2023-05-23 | End: 2023-05-26 | Stop reason: HOSPADM

## 2023-05-23 RX ORDER — GUAIFENESIN 400 MG/1
400 TABLET ORAL EVERY 8 HOURS SCHEDULED
Status: DISCONTINUED | OUTPATIENT
Start: 2023-05-23 | End: 2023-05-26 | Stop reason: HOSPADM

## 2023-05-23 RX ORDER — METHYLPREDNISOLONE SODIUM SUCCINATE 40 MG/ML
40 INJECTION, POWDER, LYOPHILIZED, FOR SOLUTION INTRAMUSCULAR; INTRAVENOUS DAILY
Status: DISCONTINUED | OUTPATIENT
Start: 2023-05-24 | End: 2023-05-24

## 2023-05-23 RX ORDER — POLYETHYLENE GLYCOL 3350 17 G/17G
17 POWDER, FOR SOLUTION ORAL DAILY PRN
Status: DISCONTINUED | OUTPATIENT
Start: 2023-05-23 | End: 2023-05-26 | Stop reason: HOSPADM

## 2023-05-23 RX ORDER — ONDANSETRON 4 MG/1
4 TABLET, ORALLY DISINTEGRATING ORAL EVERY 8 HOURS PRN
Status: DISCONTINUED | OUTPATIENT
Start: 2023-05-23 | End: 2023-05-26 | Stop reason: HOSPADM

## 2023-05-23 RX ORDER — ACETAMINOPHEN 650 MG/1
650 SUPPOSITORY RECTAL EVERY 6 HOURS PRN
Status: DISCONTINUED | OUTPATIENT
Start: 2023-05-23 | End: 2023-05-26 | Stop reason: HOSPADM

## 2023-05-23 RX ORDER — GUAIFENESIN 600 MG/1
600 TABLET, EXTENDED RELEASE ORAL 2 TIMES DAILY
Status: DISCONTINUED | OUTPATIENT
Start: 2023-05-23 | End: 2023-05-23 | Stop reason: CLARIF

## 2023-05-23 RX ORDER — ATORVASTATIN CALCIUM 20 MG/1
20 TABLET, FILM COATED ORAL DAILY
Status: DISCONTINUED | OUTPATIENT
Start: 2023-05-24 | End: 2023-05-26 | Stop reason: HOSPADM

## 2023-05-23 RX ORDER — ALBUTEROL SULFATE 2.5 MG/3ML
2.5 SOLUTION RESPIRATORY (INHALATION)
Status: DISCONTINUED | OUTPATIENT
Start: 2023-05-23 | End: 2023-05-24

## 2023-05-23 RX ORDER — BENZONATATE 100 MG/1
100 CAPSULE ORAL 3 TIMES DAILY PRN
Status: DISCONTINUED | OUTPATIENT
Start: 2023-05-23 | End: 2023-05-24

## 2023-05-23 RX ORDER — INSULIN LISPRO 100 [IU]/ML
0-4 INJECTION, SOLUTION INTRAVENOUS; SUBCUTANEOUS NIGHTLY
Status: DISCONTINUED | OUTPATIENT
Start: 2023-05-23 | End: 2023-05-24

## 2023-05-23 RX ORDER — IPRATROPIUM BROMIDE AND ALBUTEROL SULFATE 2.5; .5 MG/3ML; MG/3ML
3 SOLUTION RESPIRATORY (INHALATION) ONCE
Status: COMPLETED | OUTPATIENT
Start: 2023-05-23 | End: 2023-05-23

## 2023-05-23 RX ORDER — ARFORMOTEROL TARTRATE 15 UG/2ML
15 SOLUTION RESPIRATORY (INHALATION) 2 TIMES DAILY
Status: DISCONTINUED | OUTPATIENT
Start: 2023-05-24 | End: 2023-05-26 | Stop reason: HOSPADM

## 2023-05-23 RX ORDER — LATANOPROST 50 UG/ML
1 SOLUTION/ DROPS OPHTHALMIC NIGHTLY
Status: DISCONTINUED | OUTPATIENT
Start: 2023-05-23 | End: 2023-05-26 | Stop reason: HOSPADM

## 2023-05-23 RX ORDER — ALBUTEROL SULFATE 2.5 MG/3ML
2.5 SOLUTION RESPIRATORY (INHALATION)
Status: DISCONTINUED | OUTPATIENT
Start: 2023-05-24 | End: 2023-05-24

## 2023-05-23 RX ORDER — SODIUM CHLORIDE 0.9 % (FLUSH) 0.9 %
5-40 SYRINGE (ML) INJECTION EVERY 12 HOURS SCHEDULED
Status: DISCONTINUED | OUTPATIENT
Start: 2023-05-23 | End: 2023-05-26 | Stop reason: HOSPADM

## 2023-05-23 RX ORDER — DEXTROSE MONOHYDRATE 100 MG/ML
INJECTION, SOLUTION INTRAVENOUS CONTINUOUS PRN
Status: DISCONTINUED | OUTPATIENT
Start: 2023-05-23 | End: 2023-05-26 | Stop reason: HOSPADM

## 2023-05-23 RX ORDER — BUDESONIDE 0.25 MG/2ML
0.25 INHALANT ORAL 2 TIMES DAILY
Status: DISCONTINUED | OUTPATIENT
Start: 2023-05-24 | End: 2023-05-26 | Stop reason: HOSPADM

## 2023-05-23 RX ORDER — GUAIFENESIN 400 MG/1
400 TABLET ORAL ONCE
Status: COMPLETED | OUTPATIENT
Start: 2023-05-23 | End: 2023-05-23

## 2023-05-23 RX ORDER — SODIUM CHLORIDE 0.9 % (FLUSH) 0.9 %
5-40 SYRINGE (ML) INJECTION PRN
Status: DISCONTINUED | OUTPATIENT
Start: 2023-05-23 | End: 2023-05-26 | Stop reason: HOSPADM

## 2023-05-23 RX ORDER — LEVOFLOXACIN 500 MG/1
500 TABLET, FILM COATED ORAL DAILY
Status: DISCONTINUED | OUTPATIENT
Start: 2023-05-24 | End: 2023-05-26 | Stop reason: HOSPADM

## 2023-05-23 RX ORDER — LEVOFLOXACIN 500 MG/1
500 TABLET, FILM COATED ORAL ONCE
Status: COMPLETED | OUTPATIENT
Start: 2023-05-23 | End: 2023-05-23

## 2023-05-23 RX ORDER — SODIUM CHLORIDE 9 MG/ML
INJECTION, SOLUTION INTRAVENOUS CONTINUOUS
Status: DISCONTINUED | OUTPATIENT
Start: 2023-05-23 | End: 2023-05-26

## 2023-05-23 RX ORDER — ACETAMINOPHEN 325 MG/1
650 TABLET ORAL EVERY 6 HOURS PRN
Status: DISCONTINUED | OUTPATIENT
Start: 2023-05-23 | End: 2023-05-26 | Stop reason: HOSPADM

## 2023-05-23 RX ORDER — BROMPHENIRAMINE MALEATE, PSEUDOEPHEDRINE HYDROCHLORIDE, AND DEXTROMETHORPHAN HYDROBROMIDE 2; 30; 10 MG/5ML; MG/5ML; MG/5ML
5 SYRUP ORAL ONCE
Status: DISCONTINUED | OUTPATIENT
Start: 2023-05-23 | End: 2023-05-23 | Stop reason: RX

## 2023-05-23 RX ORDER — FLUTICASONE PROPIONATE 50 MCG
2 SPRAY, SUSPENSION (ML) NASAL DAILY
Status: DISCONTINUED | OUTPATIENT
Start: 2023-05-24 | End: 2023-05-26 | Stop reason: HOSPADM

## 2023-05-23 RX ORDER — ACETAMINOPHEN 500 MG
1000 TABLET ORAL ONCE
Status: COMPLETED | OUTPATIENT
Start: 2023-05-23 | End: 2023-05-23

## 2023-05-23 RX ADMIN — BENZONATATE 100 MG: 100 CAPSULE ORAL at 13:56

## 2023-05-23 RX ADMIN — IOPAMIDOL 75 ML: 755 INJECTION, SOLUTION INTRAVENOUS at 17:21

## 2023-05-23 RX ADMIN — METHYLPREDNISOLONE SODIUM SUCCINATE 60 MG: 125 INJECTION INTRAMUSCULAR; INTRAVENOUS at 16:40

## 2023-05-23 RX ADMIN — ACETAMINOPHEN 1000 MG: 500 TABLET ORAL at 16:41

## 2023-05-23 RX ADMIN — LEVOFLOXACIN 500 MG: 500 TABLET, FILM COATED ORAL at 21:32

## 2023-05-23 RX ADMIN — GUAIFENESIN 400 MG: 400 TABLET ORAL at 21:32

## 2023-05-23 RX ADMIN — IPRATROPIUM BROMIDE AND ALBUTEROL SULFATE 3 AMPULE: .5; 2.5 SOLUTION RESPIRATORY (INHALATION) at 13:32

## 2023-05-23 RX ADMIN — SODIUM CHLORIDE, PRESERVATIVE FREE 10 ML: 5 INJECTION INTRAVENOUS at 17:17

## 2023-05-23 ASSESSMENT — PAIN DESCRIPTION - LOCATION: LOCATION: HEAD

## 2023-05-23 ASSESSMENT — PAIN SCALES - GENERAL: PAINLEVEL_OUTOF10: 7

## 2023-05-23 ASSESSMENT — PAIN DESCRIPTION - DESCRIPTORS: DESCRIPTORS: ACHING

## 2023-05-23 ASSESSMENT — PAIN - FUNCTIONAL ASSESSMENT: PAIN_FUNCTIONAL_ASSESSMENT: 0-10

## 2023-05-23 NOTE — TELEPHONE ENCOUNTER
Writer contacted ED provider to inform of 30 day readmission risk. No Decision on disposition at this time.       Call Back: If you need to call back to inform of disposition you can contact me at [phone]      Attending physician:  Dr Preeti Rogers

## 2023-05-23 NOTE — ED NOTES
Pt ambulated to bathroom. Pt's SpO2 levels dropped to upper 80% while on RA. Pt reported SOB. 2L NC were administered to aide pt re-oxygenate. Pt O2 levels returned to appropriate levels. Pt was not able to tolerate activity d/t dyspnea.      [facility] 90, [address] [facility]  05/23/23 0628

## 2023-05-24 ENCOUNTER — ANESTHESIA EVENT (OUTPATIENT)
Dept: ENDOSCOPY | Age: 83
End: 2023-05-24
Payer: MEDICARE

## 2023-05-24 ENCOUNTER — TELEPHONE (OUTPATIENT)
Dept: FAMILY MEDICINE CLINIC | Age: 83
End: 2023-05-24

## 2023-05-24 PROBLEM — J44.1 COPD EXACERBATION (HCC): Status: ACTIVE | Noted: 2023-05-24

## 2023-05-24 PROBLEM — E11.65 UNCONTROLLED TYPE 2 DIABETES MELLITUS WITH HYPERGLYCEMIA (HCC): Status: ACTIVE | Noted: 2023-05-24

## 2023-05-24 PROBLEM — J15.9 BACTERIAL PNEUMONIA: Status: ACTIVE | Noted: 2023-05-24

## 2023-05-24 LAB
ANION GAP SERPL CALCULATED.3IONS-SCNC: 11 MMOL/L (ref 7–16)
BASOPHILS # BLD: 0.02 E9/L (ref 0–0.2)
BASOPHILS NFR BLD: 0.3 % (ref 0–2)
BUN SERPL-MCNC: 8 MG/DL (ref 6–23)
CALCIUM SERPL-MCNC: 10 MG/DL (ref 8.6–10.2)
CHLORIDE SERPL-SCNC: 103 MMOL/L (ref 98–107)
CO2 SERPL-SCNC: 24 MMOL/L (ref 22–29)
CREAT SERPL-MCNC: 0.5 MG/DL (ref 0.5–1)
EOSINOPHIL # BLD: 0.01 E9/L (ref 0.05–0.5)
EOSINOPHIL NFR BLD: 0.2 % (ref 0–6)
ERYTHROCYTE [DISTWIDTH] IN BLOOD BY AUTOMATED COUNT: 12.2 FL (ref 11.5–15)
GLUCOSE SERPL-MCNC: 264 MG/DL (ref 74–99)
HCT VFR BLD AUTO: 38.2 % (ref 34–48)
HGB BLD-MCNC: 12.9 G/DL (ref 11.5–15.5)
IMM GRANULOCYTES # BLD: 0.07 E9/L
IMM GRANULOCYTES NFR BLD: 1.1 % (ref 0–5)
LYMPHOCYTES # BLD: 0.7 E9/L (ref 1.5–4)
LYMPHOCYTES NFR BLD: 10.8 % (ref 20–42)
MCH RBC QN AUTO: 30.6 PG (ref 26–35)
MCHC RBC AUTO-ENTMCNC: 33.8 % (ref 32–34.5)
MCV RBC AUTO: 90.7 FL (ref 80–99.9)
METER GLUCOSE: 248 MG/DL (ref 74–99)
METER GLUCOSE: 256 MG/DL (ref 74–99)
METER GLUCOSE: 340 MG/DL (ref 74–99)
METER GLUCOSE: 344 MG/DL (ref 74–99)
METER GLUCOSE: 377 MG/DL (ref 74–99)
METER GLUCOSE: 392 MG/DL (ref 74–99)
MONOCYTES # BLD: 0.37 E9/L (ref 0.1–0.95)
MONOCYTES NFR BLD: 5.7 % (ref 2–12)
NEUTROPHILS # BLD: 5.31 E9/L (ref 1.8–7.3)
NEUTS SEG NFR BLD: 81.9 % (ref 43–80)
PLATELET # BLD AUTO: 242 E9/L (ref 130–450)
PMV BLD AUTO: 11.5 FL (ref 7–12)
POTASSIUM SERPL-SCNC: 4.4 MMOL/L (ref 3.5–5)
PROCALCITONIN: 0.05 NG/ML (ref 0–0.08)
RBC # BLD AUTO: 4.21 E12/L (ref 3.5–5.5)
SODIUM SERPL-SCNC: 138 MMOL/L (ref 132–146)
WBC # BLD: 6.5 E9/L (ref 4.5–11.5)

## 2023-05-24 PROCEDURE — 84145 PROCALCITONIN (PCT): CPT

## 2023-05-24 PROCEDURE — 6370000000 HC RX 637 (ALT 250 FOR IP): Performed by: NURSE PRACTITIONER

## 2023-05-24 PROCEDURE — 5A09457 ASSISTANCE WITH RESPIRATORY VENTILATION, 24-96 CONSECUTIVE HOURS, CONTINUOUS POSITIVE AIRWAY PRESSURE: ICD-10-PCS | Performed by: INTERNAL MEDICINE

## 2023-05-24 PROCEDURE — 97165 OT EVAL LOW COMPLEX 30 MIN: CPT

## 2023-05-24 PROCEDURE — 80048 BASIC METABOLIC PNL TOTAL CA: CPT

## 2023-05-24 PROCEDURE — 2580000003 HC RX 258: Performed by: NURSE PRACTITIONER

## 2023-05-24 PROCEDURE — 85025 COMPLETE CBC W/AUTO DIFF WBC: CPT

## 2023-05-24 PROCEDURE — 6360000002 HC RX W HCPCS

## 2023-05-24 PROCEDURE — 6360000002 HC RX W HCPCS: Performed by: NURSE PRACTITIONER

## 2023-05-24 PROCEDURE — 6370000000 HC RX 637 (ALT 250 FOR IP): Performed by: INTERNAL MEDICINE

## 2023-05-24 PROCEDURE — 2580000003 HC RX 258: Performed by: INTERNAL MEDICINE

## 2023-05-24 PROCEDURE — 87081 CULTURE SCREEN ONLY: CPT

## 2023-05-24 PROCEDURE — 99232 SBSQ HOSP IP/OBS MODERATE 35: CPT | Performed by: INTERNAL MEDICINE

## 2023-05-24 PROCEDURE — 87206 SMEAR FLUORESCENT/ACID STAI: CPT

## 2023-05-24 PROCEDURE — 6370000000 HC RX 637 (ALT 250 FOR IP): Performed by: STUDENT IN AN ORGANIZED HEALTH CARE EDUCATION/TRAINING PROGRAM

## 2023-05-24 PROCEDURE — 6370000000 HC RX 637 (ALT 250 FOR IP)

## 2023-05-24 PROCEDURE — 94640 AIRWAY INHALATION TREATMENT: CPT

## 2023-05-24 PROCEDURE — 94660 CPAP INITIATION&MGMT: CPT

## 2023-05-24 PROCEDURE — 87070 CULTURE OTHR SPECIMN AEROBIC: CPT

## 2023-05-24 PROCEDURE — 36415 COLL VENOUS BLD VENIPUNCTURE: CPT

## 2023-05-24 PROCEDURE — 87449 NOS EACH ORGANISM AG IA: CPT

## 2023-05-24 PROCEDURE — 82962 GLUCOSE BLOOD TEST: CPT

## 2023-05-24 PROCEDURE — 1200000000 HC SEMI PRIVATE

## 2023-05-24 RX ORDER — BENZONATATE 100 MG/1
100 CAPSULE ORAL 3 TIMES DAILY
Status: DISCONTINUED | OUTPATIENT
Start: 2023-05-24 | End: 2023-05-26 | Stop reason: HOSPADM

## 2023-05-24 RX ORDER — METHYLPREDNISOLONE SODIUM SUCCINATE 40 MG/ML
40 INJECTION, POWDER, LYOPHILIZED, FOR SOLUTION INTRAMUSCULAR; INTRAVENOUS EVERY 12 HOURS
Status: DISCONTINUED | OUTPATIENT
Start: 2023-05-24 | End: 2023-05-25

## 2023-05-24 RX ORDER — ALBUTEROL SULFATE 2.5 MG/3ML
2.5 SOLUTION RESPIRATORY (INHALATION) EVERY 4 HOURS PRN
Status: DISCONTINUED | OUTPATIENT
Start: 2023-05-24 | End: 2023-05-26 | Stop reason: HOSPADM

## 2023-05-24 RX ORDER — INSULIN LISPRO 100 [IU]/ML
0-8 INJECTION, SOLUTION INTRAVENOUS; SUBCUTANEOUS
Status: DISCONTINUED | OUTPATIENT
Start: 2023-05-24 | End: 2023-05-25

## 2023-05-24 RX ORDER — INSULIN LISPRO 100 [IU]/ML
2 INJECTION, SOLUTION INTRAVENOUS; SUBCUTANEOUS ONCE
Status: COMPLETED | OUTPATIENT
Start: 2023-05-24 | End: 2023-05-24

## 2023-05-24 RX ORDER — INSULIN LISPRO 100 [IU]/ML
0-4 INJECTION, SOLUTION INTRAVENOUS; SUBCUTANEOUS NIGHTLY
Status: DISCONTINUED | OUTPATIENT
Start: 2023-05-24 | End: 2023-05-25

## 2023-05-24 RX ADMIN — INSULIN LISPRO 4 UNITS: 100 INJECTION, SOLUTION INTRAVENOUS; SUBCUTANEOUS at 20:29

## 2023-05-24 RX ADMIN — GUAIFENESIN 400 MG: 400 TABLET ORAL at 13:45

## 2023-05-24 RX ADMIN — ARFORMOTEROL TARTRATE 15 MCG: 15 SOLUTION RESPIRATORY (INHALATION) at 10:02

## 2023-05-24 RX ADMIN — IPRATROPIUM BROMIDE 0.5 MG: 0.5 SOLUTION RESPIRATORY (INHALATION) at 10:03

## 2023-05-24 RX ADMIN — LEVOTHYROXINE SODIUM 125 MCG: 25 TABLET ORAL at 08:27

## 2023-05-24 RX ADMIN — INSULIN LISPRO 2 UNITS: 100 INJECTION, SOLUTION INTRAVENOUS; SUBCUTANEOUS at 00:23

## 2023-05-24 RX ADMIN — SODIUM CHLORIDE, PRESERVATIVE FREE 10 ML: 5 INJECTION INTRAVENOUS at 00:22

## 2023-05-24 RX ADMIN — SODIUM CHLORIDE: 9 INJECTION, SOLUTION INTRAVENOUS at 00:21

## 2023-05-24 RX ADMIN — METHYLPREDNISOLONE SODIUM SUCCINATE 40 MG: 40 INJECTION, POWDER, LYOPHILIZED, FOR SOLUTION INTRAMUSCULAR; INTRAVENOUS at 20:30

## 2023-05-24 RX ADMIN — INSULIN LISPRO 3 UNITS: 100 INJECTION, SOLUTION INTRAVENOUS; SUBCUTANEOUS at 16:21

## 2023-05-24 RX ADMIN — ENOXAPARIN SODIUM 40 MG: 100 INJECTION SUBCUTANEOUS at 08:25

## 2023-05-24 RX ADMIN — IPRATROPIUM BROMIDE 0.5 MG: 0.5 SOLUTION RESPIRATORY (INHALATION) at 13:36

## 2023-05-24 RX ADMIN — METHYLPREDNISOLONE SODIUM SUCCINATE 40 MG: 40 INJECTION INTRAMUSCULAR; INTRAVENOUS at 09:23

## 2023-05-24 RX ADMIN — ARFORMOTEROL TARTRATE 15 MCG: 15 SOLUTION RESPIRATORY (INHALATION) at 20:56

## 2023-05-24 RX ADMIN — IPRATROPIUM BROMIDE 0.5 MG: 0.5 SOLUTION RESPIRATORY (INHALATION) at 16:27

## 2023-05-24 RX ADMIN — INSULIN LISPRO 4 UNITS: 100 INJECTION, SOLUTION INTRAVENOUS; SUBCUTANEOUS at 00:23

## 2023-05-24 RX ADMIN — SODIUM CHLORIDE: 9 INJECTION, SOLUTION INTRAVENOUS at 18:57

## 2023-05-24 RX ADMIN — BENZONATATE 100 MG: 100 CAPSULE ORAL at 20:30

## 2023-05-24 RX ADMIN — DORZOLAMIDE HCL 1 DROP: 20 SOLUTION/ DROPS OPHTHALMIC at 20:27

## 2023-05-24 RX ADMIN — FLUTICASONE PROPIONATE 2 SPRAY: 50 SPRAY, METERED NASAL at 08:31

## 2023-05-24 RX ADMIN — SODIUM CHLORIDE, PRESERVATIVE FREE 10 ML: 5 INJECTION INTRAVENOUS at 08:27

## 2023-05-24 RX ADMIN — BUDESONIDE 250 MCG: 0.25 SUSPENSION RESPIRATORY (INHALATION) at 10:02

## 2023-05-24 RX ADMIN — LEVOFLOXACIN 500 MG: 500 TABLET, FILM COATED ORAL at 20:30

## 2023-05-24 RX ADMIN — IPRATROPIUM BROMIDE 0.5 MG: 0.5 SOLUTION RESPIRATORY (INHALATION) at 20:56

## 2023-05-24 RX ADMIN — INSULIN LISPRO 2 UNITS: 100 INJECTION, SOLUTION INTRAVENOUS; SUBCUTANEOUS at 06:25

## 2023-05-24 RX ADMIN — PANTOPRAZOLE SODIUM 40 MG: 40 TABLET, DELAYED RELEASE ORAL at 06:25

## 2023-05-24 RX ADMIN — GUAIFENESIN 400 MG: 400 TABLET ORAL at 20:30

## 2023-05-24 RX ADMIN — DORZOLAMIDE HCL 1 DROP: 20 SOLUTION/ DROPS OPHTHALMIC at 00:22

## 2023-05-24 RX ADMIN — BUDESONIDE 250 MCG: 0.25 SUSPENSION RESPIRATORY (INHALATION) at 20:56

## 2023-05-24 RX ADMIN — ALBUTEROL SULFATE 2.5 MG: 2.5 SOLUTION RESPIRATORY (INHALATION) at 10:02

## 2023-05-24 RX ADMIN — ATORVASTATIN CALCIUM 20 MG: 20 TABLET, FILM COATED ORAL at 08:27

## 2023-05-24 RX ADMIN — LATANOPROST 1 DROP: 50 SOLUTION OPHTHALMIC at 20:27

## 2023-05-24 RX ADMIN — ALBUTEROL SULFATE 2.5 MG: 2.5 SOLUTION RESPIRATORY (INHALATION) at 13:35

## 2023-05-24 RX ADMIN — LATANOPROST 1 DROP: 50 SOLUTION OPHTHALMIC at 00:21

## 2023-05-24 RX ADMIN — GUAIFENESIN 400 MG: 400 TABLET ORAL at 06:25

## 2023-05-24 RX ADMIN — SODIUM CHLORIDE, PRESERVATIVE FREE 10 ML: 5 INJECTION INTRAVENOUS at 09:15

## 2023-05-24 RX ADMIN — INSULIN LISPRO 1 UNITS: 100 INJECTION, SOLUTION INTRAVENOUS; SUBCUTANEOUS at 11:17

## 2023-05-24 RX ADMIN — BENZONATATE 100 MG: 100 CAPSULE ORAL at 16:13

## 2023-05-24 RX ADMIN — DORZOLAMIDE HCL 1 DROP: 20 SOLUTION/ DROPS OPHTHALMIC at 08:27

## 2023-05-24 RX ADMIN — SODIUM CHLORIDE, PRESERVATIVE FREE 10 ML: 5 INJECTION INTRAVENOUS at 20:28

## 2023-05-24 ASSESSMENT — ENCOUNTER SYMPTOMS
VOMITING: 0
BACK PAIN: 1
NAUSEA: 0
WHEEZING: 1
CHEST TIGHTNESS: 1
CONSTIPATION: 0
DIARRHEA: 0
ABDOMINAL PAIN: 0
BLOOD IN STOOL: 0
COUGH: 1
SHORTNESS OF BREATH: 1

## 2023-05-24 ASSESSMENT — COPD QUESTIONNAIRES: CAT_SEVERITY: NO INTERVAL CHANGE

## 2023-05-24 ASSESSMENT — LIFESTYLE VARIABLES: SMOKING_STATUS: 0

## 2023-05-24 NOTE — PLAN OF CARE
Problem: Discharge Planning  Goal: Discharge to home or other facility with appropriate resources  5/24/2023 0946 by Desiree Reilly RN  Outcome: Progressing  5/24/2023 0311 by Senait Paul RN  Outcome: Progressing     Problem: Safety - Adult  Goal: Free from fall injury  5/24/2023 0946 by Desiree Reilly RN  Outcome: Progressing  5/24/2023 0311 by Senait Paul RN  Outcome: Progressing     Problem: Chronic Conditions and Co-morbidities  Goal: Patient's chronic conditions and co-morbidity symptoms are monitored and maintained or improved  Outcome: Progressing

## 2023-05-24 NOTE — ED NOTES
ED to Inpatient Handoff Report    Notified Arpan Cabrales that electronic handoff available and patient ready for transport to room 411. Safety Risks: Difficulty with daily activities and Risk of falls    Patient in Restraints: no    Constant Observer or Patient : no    Telemetry Monitoring Ordered: Yes          Order to transfer to unit without monitor: YES    Last MEWS: 2 Time completed: 2225    Vitals:    05/23/23 1845 05/23/23 1945 05/23/23 2045 05/23/23 2219   BP: 137/81 (!) 149/99 (!) 159/75 (!) 144/80   Pulse: 92 (!) 101 99 90   Resp: 29 29 28 25   Temp:    97.5 °F (36.4 °C)   TempSrc:    Oral   SpO2: 96% 94% 92% 94%       Opportunity for questions and clarification was provided.         [address]  05/23/23 2226

## 2023-05-24 NOTE — ED PROVIDER NOTES
[facility]  ED Provider Note  Department of Emergency Medicine     ED Room:       Written by: Jose Alejandro Serra DO  Patient Name: Huy Day  Attending Provider: Reyes Kings, DO;Dariel*  Admit Date: 2023 12:57 PM  MRN: [id number]    : 1940        Chief Complaint   Patient presents with    Chest Pain     Onset yesterday radiates into back    Shortness of Breath     97% on Ra, hx COPD    Cough    Fatigue       HPI   Huy Day is a 78 y.o. female presenting to the ED for evaluation of Chest Pain (Onset yesterday radiates into back), Shortness of Breath (97% on Ra, hx COPD), Cough, and Fatigue      History obtained from the patient and chart review. Limitations to history : None      Patient is an 78-year-old male with past medical history of COPD, left breast cancer in the past s/p treatment, non-insulin-dependent DM 2, HLD, hypothyroidism. She is presenting to the ED for evaluation of cough, chest tightness, shortness of breath and fatigue. She was actually just recently admitted for the symptoms; per chart review she was admitted from  to 5/3 for COPD exacerbation; she was evaluated with pulmonology and during that admission, improved with breathing treatments antitussives and Solu-Medrol. Her pulse ox was normal.  She was discharged home on a prednisone taper at that time. She had an office visit with pulmonology on 2023 and her Mucinex was discontinued, she is also been discontinued off of prednisone and was started on doxycycline which she is still taking. She reports she is having persistent cough, fatigue, and chest pain/tightness from all the coughing. She states the sputum is yellow-green in color, denies hemoptysis. She reports dyspnea on exertion.   Of note, triage reports chest pain that \"radiates into back\", the patient does not describe this to me, states that she will feel strain/pain in her back with coughing but denies any constant

## 2023-05-24 NOTE — CARE COORDINATION
Introduced my self and provided explanation of CM role to patient. Patient is awake, alert, and aware of current diagnosis and treatment plan including iv steroid therapy, po atb, discharge planning  Patient voices intention to seek short term rehab stay post discharge. She is provided a list along with her daughter Yani aLla via phone. Selections are [facility]. Referral is called to [facility] with [address]  Will await her review and response regarding bed availability/acceptance. Ulises Parnell.  Hoang, MSN RN  [facility] Case Management  [phone]

## 2023-05-24 NOTE — ACP (ADVANCE CARE PLANNING)
Advance Care Planning   Healthcare Decision Maker:    Primary Decision Maker: Titi Lantiguaruth - Child - [phone]    Secondary Decision Maker: Severino Mancia - Child - [phone]    Click here to complete Healthcare Decision Makers including selection of the Healthcare Decision Maker Relationship (ie \"Primary\"). Today we documented Decision Maker(s) consistent with Legal Next of Kin hierarchy.

## 2023-05-24 NOTE — TELEPHONE ENCOUNTER
Daughter called requesting a call back in regards to her [de-identified] is in the hospital and would like Dr Ghanshyam Roland opinion on the Bronchoscopy. Is it safe to get Bronchoscopy?

## 2023-05-24 NOTE — ANESTHESIA PRE PROCEDURE
Anesthesiologist  May 24, 2023  6:11 PM    Pt seen with daughter H&P reviewed plan discussed accepts LEYLA. Leidy Dubon m.d 05/25/2023 1539.

## 2023-05-24 NOTE — CONSULTS
Savi Vaca M.D.,[facility]  Kareem Miles D.O., F.JESUS.CARLOTA.OFLORY., Caleb Vargas M.D. Andrade Gallegos M.D. Gaviota Scherer D.O. Patient:  Francy Huerta 78 y.o. female MRN: [id number]     Date of Service: 5/24/2023      PULMONARY CONSULTATION    Reason for Consultation: Endobronchial obstructive process, shortness of breath, cough    Referring Physician: Raleigh Chan D.O. Communication with the referring physician will be sent via the electronic medical record. Chief Complaint: SOB    CODE STATUS: Full Code    SUBJECTIVE:  HPI:  Francy Huerta is a 78 y.o. female known to our practice with a past medical historybreast cancer 2012 treated with lumpectomy and radiation, COPD, prior nicotine dependence, prior pneumonia, diabetes mellitus type 2, allergic rhinitis, GERD, glaucoma,  hypothyroidism, HLD, prior appendectomy, hysterectomy, prior nicotine dependence 22.5 pack years quit approximately 18 years ago. She is a known patient to our practice for mild Gold stage I COPD. She has a history of lung cancer -stage Ib adenocarcinoma of left lung with history of VATS left upper lobectomy September 2017 at [facility]. She just had a brief hospital stay at the beginning of this month for possible mucous plugging of the right lower lobe and was sent home on Mucinex, prednisone taper and her home Trelegy and as needed albuterol. She did not require home oxygen and she was to follow-up with a chest CT in 3 months. On May 17 she had an office visit with one of our nurse practitioners where she was having a productive cough and she was instructed to stop the Mucinex and resume a course of doxycycline that she was taking previously. After another week if not getting better with the shortness of breath and coughing up dark mucus she came back to the hospital.    Melany Lao was seen today on room air sitting up in bed. She is a little short of breath and her face is little flushed.   Respiratory panel is

## 2023-05-24 NOTE — ED NOTES
As per Dr. Judith Hernandez, pt can be transported to IP unit without Telemetry monitoring. Monitoring to resume promptly once pt arrives to destination unless Attending Provider instructs otherwise.       [facility] 90, [facility]  05/23/23 4145

## 2023-05-24 NOTE — H&P
[facility] History and Physical      CHIEF COMPLAINT:  Shortness of breath    History of Present Illness: This is a 78year old female with significant PMH of breast CA s/p radiation and chemo, COPD, HLD, hypothyroidism, and type 2 diabetes. Patient recently admitted 05/01/2023 - 05/03/2023 for COPD exacerbation. Followed by pulmonary during admission. Patient discharged home on prednisone taper. To ED due to shortness of breath. Patient reports having shortness of breath, headache, coughing for the last week. Associated symptoms include increased sputum production. Patient reports coughing up thick yellow mucus. Patient also admits to being very weak. Denies fever, chills, abdominal pain, and changes in bowel/bladder habits. Does report chest pain but states her chest only hurts with coughing. Reports pain goes across chest and her upper back. Respiratory panel negative. Procalcitonin 0.05, proBNP 85, and troponin 9. Labs unremarkable. CT negative for PE. Showing \"ongoing endobronchial process with stenosis and obliteration of central lumen of segment and subsegmental bronchi of the right lower lobe. \"  Pulmonary consulted in ED and patient started on Levaquin and Solu-Medrol. Also received Tylenol and Tessalon.     Informant(s) for H&P: Patient and chart review    REVIEW OF SYSTEMS:  A comprehensive review of systems was negative except for: what is in the HPI      PMH:  Past Medical History:   Diagnosis Date    Anesthesia     wakes up very slowly and has lasting drowsiness    Breast cancer ([facility] [address]     lt breast 2012    COPD with exacerbation ([facility] [address] 11/14/2015    Glaucoma     History of colon polyps     History of therapeutic radiation     Hx antineoplastic chemo     Hyperlipidemia     Hypothyroidism     Osteoarthritis     Pneumonia     Type 2 diabetes mellitus 4/17/2023       Surgical History:  Past Surgical History:   Procedure Laterality Date    APPENDECTOMY      BREAST

## 2023-05-25 ENCOUNTER — ANESTHESIA (OUTPATIENT)
Dept: ENDOSCOPY | Age: 83
End: 2023-05-25
Payer: MEDICARE

## 2023-05-25 LAB
ANION GAP SERPL CALCULATED.3IONS-SCNC: 9 MMOL/L (ref 7–16)
BASOPHILS # BLD: 0.01 E9/L (ref 0–0.2)
BASOPHILS NFR BLD: 0.1 % (ref 0–2)
BUN SERPL-MCNC: 13 MG/DL (ref 6–23)
CALCIUM SERPL-MCNC: 9.9 MG/DL (ref 8.6–10.2)
CHLORIDE SERPL-SCNC: 105 MMOL/L (ref 98–107)
CO2 SERPL-SCNC: 23 MMOL/L (ref 22–29)
CREAT SERPL-MCNC: 0.6 MG/DL (ref 0.5–1)
EOSINOPHIL # BLD: 0 E9/L (ref 0.05–0.5)
EOSINOPHIL NFR BLD: 0 % (ref 0–6)
ERYTHROCYTE [DISTWIDTH] IN BLOOD BY AUTOMATED COUNT: 12.2 FL (ref 11.5–15)
GLUCOSE SERPL-MCNC: 323 MG/DL (ref 74–99)
HCT VFR BLD AUTO: 37.3 % (ref 34–48)
HGB BLD-MCNC: 12.6 G/DL (ref 11.5–15.5)
IMM GRANULOCYTES # BLD: 0.11 E9/L
IMM GRANULOCYTES NFR BLD: 1.3 % (ref 0–5)
LEGIONELLA AG UR QL: NORMAL
LYMPHOCYTES # BLD: 0.61 E9/L (ref 1.5–4)
LYMPHOCYTES NFR BLD: 7.3 % (ref 20–42)
MCH RBC QN AUTO: 30.6 PG (ref 26–35)
MCHC RBC AUTO-ENTMCNC: 33.8 % (ref 32–34.5)
MCV RBC AUTO: 90.5 FL (ref 80–99.9)
METER GLUCOSE: 169 MG/DL (ref 74–99)
METER GLUCOSE: 219 MG/DL (ref 74–99)
METER GLUCOSE: 286 MG/DL (ref 74–99)
METER GLUCOSE: 303 MG/DL (ref 74–99)
MONOCYTES # BLD: 0.32 E9/L (ref 0.1–0.95)
MONOCYTES NFR BLD: 3.8 % (ref 2–12)
NEUTROPHILS # BLD: 7.36 E9/L (ref 1.8–7.3)
NEUTS SEG NFR BLD: 87.5 % (ref 43–80)
PLATELET # BLD AUTO: 249 E9/L (ref 130–450)
PMV BLD AUTO: 11.7 FL (ref 7–12)
POTASSIUM SERPL-SCNC: 4.6 MMOL/L (ref 3.5–5)
RBC # BLD AUTO: 4.12 E12/L (ref 3.5–5.5)
S PNEUM AG SPEC QL: NORMAL
SODIUM SERPL-SCNC: 137 MMOL/L (ref 132–146)
WBC # BLD: 8.4 E9/L (ref 4.5–11.5)

## 2023-05-25 PROCEDURE — 3609027000 HC BRONCHOSCOPY: Performed by: INTERNAL MEDICINE

## 2023-05-25 PROCEDURE — 7100000010 HC PHASE II RECOVERY - FIRST 15 MIN: Performed by: INTERNAL MEDICINE

## 2023-05-25 PROCEDURE — 2580000003 HC RX 258: Performed by: INTERNAL MEDICINE

## 2023-05-25 PROCEDURE — 6370000000 HC RX 637 (ALT 250 FOR IP): Performed by: STUDENT IN AN ORGANIZED HEALTH CARE EDUCATION/TRAINING PROGRAM

## 2023-05-25 PROCEDURE — 6360000002 HC RX W HCPCS: Performed by: NURSE PRACTITIONER

## 2023-05-25 PROCEDURE — 2580000003 HC RX 258

## 2023-05-25 PROCEDURE — 6370000000 HC RX 637 (ALT 250 FOR IP)

## 2023-05-25 PROCEDURE — 6370000000 HC RX 637 (ALT 250 FOR IP): Performed by: INTERNAL MEDICINE

## 2023-05-25 PROCEDURE — 6360000002 HC RX W HCPCS: Performed by: INTERNAL MEDICINE

## 2023-05-25 PROCEDURE — 6360000002 HC RX W HCPCS

## 2023-05-25 PROCEDURE — 2500000003 HC RX 250 WO HCPCS

## 2023-05-25 PROCEDURE — 2060000000 HC ICU INTERMEDIATE R&B

## 2023-05-25 PROCEDURE — 97161 PT EVAL LOW COMPLEX 20 MIN: CPT

## 2023-05-25 PROCEDURE — 3700000001 HC ADD 15 MINUTES (ANESTHESIA): Performed by: INTERNAL MEDICINE

## 2023-05-25 PROCEDURE — 3700000000 HC ANESTHESIA ATTENDED CARE: Performed by: INTERNAL MEDICINE

## 2023-05-25 PROCEDURE — 99232 SBSQ HOSP IP/OBS MODERATE 35: CPT | Performed by: INTERNAL MEDICINE

## 2023-05-25 PROCEDURE — 80048 BASIC METABOLIC PNL TOTAL CA: CPT

## 2023-05-25 PROCEDURE — 36415 COLL VENOUS BLD VENIPUNCTURE: CPT

## 2023-05-25 PROCEDURE — 94660 CPAP INITIATION&MGMT: CPT

## 2023-05-25 PROCEDURE — 87116 MYCOBACTERIA CULTURE: CPT

## 2023-05-25 PROCEDURE — 87206 SMEAR FLUORESCENT/ACID STAI: CPT

## 2023-05-25 PROCEDURE — 87205 SMEAR GRAM STAIN: CPT

## 2023-05-25 PROCEDURE — 87070 CULTURE OTHR SPECIMN AEROBIC: CPT

## 2023-05-25 PROCEDURE — 7100000011 HC PHASE II RECOVERY - ADDTL 15 MIN: Performed by: INTERNAL MEDICINE

## 2023-05-25 PROCEDURE — 0BC68ZZ EXTIRPATION OF MATTER FROM RIGHT LOWER LOBE BRONCHUS, VIA NATURAL OR ARTIFICIAL OPENING ENDOSCOPIC: ICD-10-PCS | Performed by: INTERNAL MEDICINE

## 2023-05-25 PROCEDURE — 2580000003 HC RX 258: Performed by: NURSE PRACTITIONER

## 2023-05-25 PROCEDURE — 82962 GLUCOSE BLOOD TEST: CPT

## 2023-05-25 PROCEDURE — 2709999900 HC NON-CHARGEABLE SUPPLY: Performed by: INTERNAL MEDICINE

## 2023-05-25 PROCEDURE — 6370000000 HC RX 637 (ALT 250 FOR IP): Performed by: NURSE PRACTITIONER

## 2023-05-25 PROCEDURE — 85025 COMPLETE CBC W/AUTO DIFF WBC: CPT

## 2023-05-25 PROCEDURE — 94640 AIRWAY INHALATION TREATMENT: CPT

## 2023-05-25 RX ORDER — INSULIN GLARGINE 100 [IU]/ML
20 INJECTION, SOLUTION SUBCUTANEOUS DAILY
Status: DISCONTINUED | OUTPATIENT
Start: 2023-05-25 | End: 2023-05-26 | Stop reason: HOSPADM

## 2023-05-25 RX ORDER — SODIUM CHLORIDE 0.9 % (FLUSH) 0.9 %
5-40 SYRINGE (ML) INJECTION PRN
Status: DISCONTINUED | OUTPATIENT
Start: 2023-05-25 | End: 2023-05-26 | Stop reason: HOSPADM

## 2023-05-25 RX ORDER — INSULIN LISPRO 100 [IU]/ML
0-16 INJECTION, SOLUTION INTRAVENOUS; SUBCUTANEOUS
Status: DISCONTINUED | OUTPATIENT
Start: 2023-05-25 | End: 2023-05-26 | Stop reason: HOSPADM

## 2023-05-25 RX ORDER — INSULIN LISPRO 100 [IU]/ML
0-4 INJECTION, SOLUTION INTRAVENOUS; SUBCUTANEOUS NIGHTLY
Status: DISCONTINUED | OUTPATIENT
Start: 2023-05-25 | End: 2023-05-26 | Stop reason: HOSPADM

## 2023-05-25 RX ORDER — SODIUM CHLORIDE 9 MG/ML
INJECTION, SOLUTION INTRAVENOUS CONTINUOUS PRN
Status: DISCONTINUED | OUTPATIENT
Start: 2023-05-25 | End: 2023-05-25 | Stop reason: SDUPTHER

## 2023-05-25 RX ORDER — PROPOFOL 10 MG/ML
INJECTION, EMULSION INTRAVENOUS PRN
Status: DISCONTINUED | OUTPATIENT
Start: 2023-05-25 | End: 2023-05-25 | Stop reason: SDUPTHER

## 2023-05-25 RX ORDER — SODIUM CHLORIDE 9 MG/ML
INJECTION, SOLUTION INTRAVENOUS PRN
Status: DISCONTINUED | OUTPATIENT
Start: 2023-05-25 | End: 2023-05-26 | Stop reason: HOSPADM

## 2023-05-25 RX ORDER — PREDNISONE 20 MG/1
40 TABLET ORAL DAILY
Status: DISCONTINUED | OUTPATIENT
Start: 2023-05-25 | End: 2023-05-26

## 2023-05-25 RX ORDER — SODIUM CHLORIDE 0.9 % (FLUSH) 0.9 %
5-40 SYRINGE (ML) INJECTION EVERY 12 HOURS SCHEDULED
Status: DISCONTINUED | OUTPATIENT
Start: 2023-05-25 | End: 2023-05-26 | Stop reason: HOSPADM

## 2023-05-25 RX ORDER — LIDOCAINE HYDROCHLORIDE 20 MG/ML
INJECTION, SOLUTION EPIDURAL; INFILTRATION; INTRACAUDAL; PERINEURAL PRN
Status: DISCONTINUED | OUTPATIENT
Start: 2023-05-25 | End: 2023-05-25 | Stop reason: SDUPTHER

## 2023-05-25 RX ADMIN — IPRATROPIUM BROMIDE 0.5 MG: 0.5 SOLUTION RESPIRATORY (INHALATION) at 19:38

## 2023-05-25 RX ADMIN — BENZONATATE 100 MG: 100 CAPSULE ORAL at 09:24

## 2023-05-25 RX ADMIN — GUAIFENESIN 400 MG: 400 TABLET ORAL at 21:19

## 2023-05-25 RX ADMIN — LEVOTHYROXINE SODIUM 125 MCG: 25 TABLET ORAL at 09:24

## 2023-05-25 RX ADMIN — SODIUM CHLORIDE: 9 INJECTION, SOLUTION INTRAVENOUS at 14:54

## 2023-05-25 RX ADMIN — BUDESONIDE 250 MCG: 0.25 SUSPENSION RESPIRATORY (INHALATION) at 19:38

## 2023-05-25 RX ADMIN — BENZONATATE 100 MG: 100 CAPSULE ORAL at 21:19

## 2023-05-25 RX ADMIN — SODIUM CHLORIDE, PRESERVATIVE FREE 10 ML: 5 INJECTION INTRAVENOUS at 21:20

## 2023-05-25 RX ADMIN — ATORVASTATIN CALCIUM 20 MG: 20 TABLET, FILM COATED ORAL at 09:24

## 2023-05-25 RX ADMIN — SODIUM CHLORIDE, PRESERVATIVE FREE 10 ML: 5 INJECTION INTRAVENOUS at 09:25

## 2023-05-25 RX ADMIN — DORZOLAMIDE HCL 1 DROP: 20 SOLUTION/ DROPS OPHTHALMIC at 09:25

## 2023-05-25 RX ADMIN — METHYLPREDNISOLONE SODIUM SUCCINATE 40 MG: 40 INJECTION, POWDER, LYOPHILIZED, FOR SOLUTION INTRAMUSCULAR; INTRAVENOUS at 09:25

## 2023-05-25 RX ADMIN — ARFORMOTEROL TARTRATE 15 MCG: 15 SOLUTION RESPIRATORY (INHALATION) at 19:37

## 2023-05-25 RX ADMIN — PANTOPRAZOLE SODIUM 40 MG: 40 TABLET, DELAYED RELEASE ORAL at 06:29

## 2023-05-25 RX ADMIN — BENZONATATE 100 MG: 100 CAPSULE ORAL at 18:01

## 2023-05-25 RX ADMIN — INSULIN LISPRO 6 UNITS: 100 INJECTION, SOLUTION INTRAVENOUS; SUBCUTANEOUS at 07:34

## 2023-05-25 RX ADMIN — PREDNISONE 40 MG: 20 TABLET ORAL at 18:01

## 2023-05-25 RX ADMIN — FLUTICASONE PROPIONATE 2 SPRAY: 50 SPRAY, METERED NASAL at 09:25

## 2023-05-25 RX ADMIN — DORZOLAMIDE HCL 1 DROP: 20 SOLUTION/ DROPS OPHTHALMIC at 16:57

## 2023-05-25 RX ADMIN — ENOXAPARIN SODIUM 40 MG: 100 INJECTION SUBCUTANEOUS at 09:25

## 2023-05-25 RX ADMIN — ARFORMOTEROL TARTRATE 15 MCG: 15 SOLUTION RESPIRATORY (INHALATION) at 07:47

## 2023-05-25 RX ADMIN — GUAIFENESIN 400 MG: 400 TABLET ORAL at 18:01

## 2023-05-25 RX ADMIN — INSULIN LISPRO 4 UNITS: 100 INJECTION, SOLUTION INTRAVENOUS; SUBCUTANEOUS at 17:02

## 2023-05-25 RX ADMIN — INSULIN GLARGINE 20 UNITS: 100 INJECTION, SOLUTION SUBCUTANEOUS at 09:26

## 2023-05-25 RX ADMIN — SODIUM CHLORIDE: 9 INJECTION, SOLUTION INTRAVENOUS at 15:48

## 2023-05-25 RX ADMIN — LIDOCAINE HYDROCHLORIDE 40 MG: 20 INJECTION, SOLUTION EPIDURAL; INFILTRATION; INTRACAUDAL; PERINEURAL at 15:52

## 2023-05-25 RX ADMIN — LATANOPROST 1 DROP: 50 SOLUTION OPHTHALMIC at 21:21

## 2023-05-25 RX ADMIN — BUDESONIDE 250 MCG: 0.25 SUSPENSION RESPIRATORY (INHALATION) at 07:47

## 2023-05-25 RX ADMIN — IPRATROPIUM BROMIDE 0.5 MG: 0.5 SOLUTION RESPIRATORY (INHALATION) at 12:23

## 2023-05-25 RX ADMIN — PROPOFOL 140 MG: 10 INJECTION, EMULSION INTRAVENOUS at 15:52

## 2023-05-25 RX ADMIN — GUAIFENESIN 400 MG: 400 TABLET ORAL at 06:29

## 2023-05-25 RX ADMIN — DORZOLAMIDE HCL 1 DROP: 20 SOLUTION/ DROPS OPHTHALMIC at 21:21

## 2023-05-25 RX ADMIN — IPRATROPIUM BROMIDE 0.5 MG: 0.5 SOLUTION RESPIRATORY (INHALATION) at 07:47

## 2023-05-25 RX ADMIN — LEVOFLOXACIN 500 MG: 500 TABLET, FILM COATED ORAL at 21:19

## 2023-05-25 NOTE — CARE COORDINATION
Received notification from Vikas stewart at [facility] that facility can accept patient for SNF stay at time of discharge if prior auth is obtained from [facility]. SANDY initiated, envelope completed with demos  and [id number]. Can likely transport via facility van. Arielle Bolton, MSN RN  [facility] Case Management  [phone]

## 2023-05-25 NOTE — DISCHARGE INSTR - COC
Continuity of Care Form    Patient Name: Rebecca Boyce   :  1940  MRN:  [id number]    Admit date:  2023  Discharge date:  2023    Code Status Order: Full Code   Advance Directives:     Admitting Physician:  Nicole Mackey MD  PCP: Diana Jenkins MD    Discharging Nurse: [place] Unit/Room#: 6275/5907-X  Discharging Unit Phone Number: [phone]    Emergency Contact:   Extended Emergency Contact Information  Primary Emergency Contact: Gray Castorena [address] Phone: [phone]  Mobile Phone: [phone]  Relation: Child  Secondary Emergency Contact: Vero Torres [address] Phone: [phone]  Mobile Phone: [phone]  Relation: Other    Past Surgical History:  Past Surgical History:   Procedure Laterality Date    APPENDECTOMY      BREAST LUMPECTOMY  2012    Right    BREAST SURGERY      left breast > benign 40 + years ago    COLONOSCOPY  2013    COLONOSCOPY  [id number]    EYE SURGERY      HYSTERECTOMY (CERVIX STATUS UNKNOWN)      CARMELITA    OTHER SURGICAL HISTORY  12    r needle localized axillary sentinel lymph node exision       Immunization History:   Immunization History   Administered Date(s) Administered    COVID-19, PFIZER GRAY top, DO NOT Dilute, (age 15 y+), IM, 30 mcg/0.3 mL 2022    COVID-19, PFIZER PURPLE top, DILUTE for use, (age 15 y+), 30mcg/0.3mL 2021, 02/15/2021, 2021    Influenza A (D2I9-60) Vaccine PF IM 01/15/2010    Influenza Virus Vaccine 10/03/2014, 10/07/2015, 10/04/2016    Influenza, FLUAD, (age 72 y+), Adjuvanted, 0.5mL 2020, 10/06/2021, 10/06/2022    Influenza, High Dose (Fluzone 65 yrs and older) 10/03/2014, 10/07/2015, 10/04/2016, 2017, 2018    Influenza, Triv, inactivated, subunit, adjuvanted, IM (Fluad 65 yrs and older) 2019    Pneumococcal, PCV-13, PREVNAR 15, (age 6w+), IM, 0.5mL 10/07/2015    Pneumococcal, PPSV23, PNEUMOVAX 23, (age 2y+), SC/IM, 0.5mL

## 2023-05-25 NOTE — OP NOTE
Operative Note      Patient: Vanessa Casey  YOB: 1940  MRN: [id number]    Date of Procedure: 5/25/2023    Pre-Op Diagnosis Codes:     * Mucus plugging of bronchi [T17.500A]    Post-Op Diagnosis: Same       Procedure(s):  BRONCHOSCOPY DIAGNOSTIC OR CELL [address] ONLY  ++DR WILL CALL IN AM++    Surgeon(s):  Luis Guzman MD    Assistant:   * No surgical staff found *    Anesthesia: Monitor Anesthesia Care    Estimated Blood Loss (mL): Minimal    Complications: None    Specimens:   ID Type Source Tests Collected by Time Destination   1 : right mainstem wash Respiratory Bronchial Washing GRAM STAIN, CULTURE WITH SMEAR, ACID FAST Luvenia Blayne N Federico Baez MD 5/25/2023 1556        Implants:  * No implants in log *      Drains: * No LDAs found *    Findings: inspissated secretions in the right lower lobe        Detailed Description of Procedure: The bronchoscope was introduced through the mouth and advanced throughout the tracheobronchial tree. The vocal cords appeared normal.  The trachea and mainstem bronchi demonstrated evidence of excessive dynamic airway collapse. There were thick yellow inspissated secretions in the right lower lobe which were removed with suctioning and irrigation. A washing was sent from this area for culture. The left upper lobe suture site was intact although friable. There was non clinically significant bleeding with suctioning. The scope was withdrawn at the conclusion of the procedure. Patient tolerated the procedure well.     Start time: 15:55PM  End time: 16:01AM       Luis Guzman MD   5/25/2023 4:04 PM

## 2023-05-25 NOTE — PLAN OF CARE
Problem: Discharge Planning  Goal: Discharge to home or other facility with appropriate resources  5/25/2023 0951 by Zoe England RN  Outcome: Progressing  5/25/2023 0029 by Daryle Clock, RN  Outcome: Progressing     Problem: Safety - Adult  Goal: Free from fall injury  5/25/2023 0951 by Zoe England RN  Outcome: Progressing  5/25/2023 0029 by Daryle Clock, RN  Outcome: Progressing     Problem: Chronic Conditions and Co-morbidities  Goal: Patient's chronic conditions and co-morbidity symptoms are monitored and maintained or improved  5/25/2023 0951 by Zoe England RN  Outcome: Progressing  5/25/2023 0029 by Daryle Clock, RN  Outcome: Progressing

## 2023-05-26 ENCOUNTER — APPOINTMENT (OUTPATIENT)
Dept: GENERAL RADIOLOGY | Age: 83
End: 2023-05-26
Payer: MEDICARE

## 2023-05-26 VITALS
DIASTOLIC BLOOD PRESSURE: 61 MMHG | OXYGEN SATURATION: 97 % | HEART RATE: 76 BPM | RESPIRATION RATE: 18 BRPM | HEIGHT: 62 IN | WEIGHT: 171.8 LBS | TEMPERATURE: 97.7 F | SYSTOLIC BLOOD PRESSURE: 132 MMHG | BODY MASS INDEX: 31.62 KG/M2

## 2023-05-26 LAB
ANION GAP SERPL CALCULATED.3IONS-SCNC: 8 MMOL/L (ref 7–16)
BACTERIA SPEC RESP CULT: NORMAL
BASOPHILS # BLD: 0.01 E9/L (ref 0–0.2)
BASOPHILS NFR BLD: 0.1 % (ref 0–2)
BUN SERPL-MCNC: 14 MG/DL (ref 6–23)
CALCIUM SERPL-MCNC: 9.6 MG/DL (ref 8.6–10.2)
CHLORIDE SERPL-SCNC: 104 MMOL/L (ref 98–107)
CO2 SERPL-SCNC: 24 MMOL/L (ref 22–29)
CREAT SERPL-MCNC: 0.5 MG/DL (ref 0.5–1)
EOSINOPHIL # BLD: 0.04 E9/L (ref 0.05–0.5)
EOSINOPHIL NFR BLD: 0.4 % (ref 0–6)
ERYTHROCYTE [DISTWIDTH] IN BLOOD BY AUTOMATED COUNT: 12.4 FL (ref 11.5–15)
GLUCOSE SERPL-MCNC: 293 MG/DL (ref 74–99)
HCT VFR BLD AUTO: 35.7 % (ref 34–48)
HGB BLD-MCNC: 12.2 G/DL (ref 11.5–15.5)
IMM GRANULOCYTES # BLD: 0.11 E9/L
IMM GRANULOCYTES NFR BLD: 1 % (ref 0–5)
LYMPHOCYTES # BLD: 0.7 E9/L (ref 1.5–4)
LYMPHOCYTES NFR BLD: 6.3 % (ref 20–42)
MCH RBC QN AUTO: 31 PG (ref 26–35)
MCHC RBC AUTO-ENTMCNC: 34.2 % (ref 32–34.5)
MCV RBC AUTO: 90.6 FL (ref 80–99.9)
METER GLUCOSE: 194 MG/DL (ref 74–99)
METER GLUCOSE: 277 MG/DL (ref 74–99)
METER GLUCOSE: 350 MG/DL (ref 74–99)
MONOCYTES # BLD: 0.46 E9/L (ref 0.1–0.95)
MONOCYTES NFR BLD: 4.1 % (ref 2–12)
MRSA SPEC QL CULT: NORMAL
NEUTROPHILS # BLD: 9.87 E9/L (ref 1.8–7.3)
NEUTS SEG NFR BLD: 88.1 % (ref 43–80)
PLATELET # BLD AUTO: 245 E9/L (ref 130–450)
PMV BLD AUTO: 11.5 FL (ref 7–12)
POTASSIUM SERPL-SCNC: 4.5 MMOL/L (ref 3.5–5)
RBC # BLD AUTO: 3.94 E12/L (ref 3.5–5.5)
SMEAR, RESPIRATORY: NORMAL
SODIUM SERPL-SCNC: 136 MMOL/L (ref 132–146)
WBC # BLD: 11.2 E9/L (ref 4.5–11.5)

## 2023-05-26 PROCEDURE — 85025 COMPLETE CBC W/AUTO DIFF WBC: CPT

## 2023-05-26 PROCEDURE — 2500000003 HC RX 250 WO HCPCS: Performed by: RADIOLOGY

## 2023-05-26 PROCEDURE — 6370000000 HC RX 637 (ALT 250 FOR IP): Performed by: INTERNAL MEDICINE

## 2023-05-26 PROCEDURE — 92526 ORAL FUNCTION THERAPY: CPT | Performed by: SPEECH-LANGUAGE PATHOLOGIST

## 2023-05-26 PROCEDURE — 94640 AIRWAY INHALATION TREATMENT: CPT

## 2023-05-26 PROCEDURE — 80048 BASIC METABOLIC PNL TOTAL CA: CPT

## 2023-05-26 PROCEDURE — 92611 MOTION FLUOROSCOPY/SWALLOW: CPT | Performed by: SPEECH-LANGUAGE PATHOLOGIST

## 2023-05-26 PROCEDURE — 99239 HOSP IP/OBS DSCHRG MGMT >30: CPT | Performed by: INTERNAL MEDICINE

## 2023-05-26 PROCEDURE — 74230 X-RAY XM SWLNG FUNCJ C+: CPT

## 2023-05-26 PROCEDURE — 97535 SELF CARE MNGMENT TRAINING: CPT

## 2023-05-26 PROCEDURE — 94660 CPAP INITIATION&MGMT: CPT

## 2023-05-26 PROCEDURE — 82962 GLUCOSE BLOOD TEST: CPT

## 2023-05-26 PROCEDURE — 92610 EVALUATE SWALLOWING FUNCTION: CPT | Performed by: SPEECH-LANGUAGE PATHOLOGIST

## 2023-05-26 PROCEDURE — APPSS45 APP SPLIT SHARED TIME 31-45 MINUTES: Performed by: NURSE PRACTITIONER

## 2023-05-26 PROCEDURE — 2580000003 HC RX 258: Performed by: INTERNAL MEDICINE

## 2023-05-26 PROCEDURE — 6360000002 HC RX W HCPCS: Performed by: INTERNAL MEDICINE

## 2023-05-26 PROCEDURE — APPSS30 APP SPLIT SHARED TIME 16-30 MINUTES: Performed by: NURSE PRACTITIONER

## 2023-05-26 PROCEDURE — 36415 COLL VENOUS BLD VENIPUNCTURE: CPT

## 2023-05-26 RX ORDER — BENZONATATE 100 MG/1
100 CAPSULE ORAL 3 TIMES DAILY PRN
Qty: 21 CAPSULE | Refills: 0 | DISCHARGE
Start: 2023-05-26 | End: 2023-06-02

## 2023-05-26 RX ORDER — PREDNISONE 20 MG/1
TABLET ORAL
Qty: 4 TABLET | Refills: 0 | DISCHARGE
Start: 2023-05-27 | End: 2023-06-07

## 2023-05-26 RX ORDER — LEVOFLOXACIN 500 MG/1
500 TABLET, FILM COATED ORAL DAILY
Qty: 3 TABLET | Refills: 0 | DISCHARGE
Start: 2023-05-26 | End: 2023-05-29

## 2023-05-26 RX ORDER — INSULIN GLARGINE 100 [IU]/ML
20 INJECTION, SOLUTION SUBCUTANEOUS DAILY
Qty: 10 ML | Refills: 3 | DISCHARGE
Start: 2023-05-27

## 2023-05-26 RX ORDER — PREDNISONE 20 MG/1
40 TABLET ORAL
Status: DISCONTINUED | OUTPATIENT
Start: 2023-05-27 | End: 2023-05-26 | Stop reason: HOSPADM

## 2023-05-26 RX ADMIN — BARIUM SULFATE 70 G: 0.81 POWDER, FOR SUSPENSION ORAL at 15:37

## 2023-05-26 RX ADMIN — DORZOLAMIDE HCL 1 DROP: 20 SOLUTION/ DROPS OPHTHALMIC at 08:50

## 2023-05-26 RX ADMIN — BARIUM SULFATE 10 ML: 400 PASTE ORAL at 15:37

## 2023-05-26 RX ADMIN — GUAIFENESIN 400 MG: 400 TABLET ORAL at 14:15

## 2023-05-26 RX ADMIN — BARIUM SULFATE 120 ML: 400 SUSPENSION ORAL at 15:38

## 2023-05-26 RX ADMIN — ENOXAPARIN SODIUM 40 MG: 100 INJECTION SUBCUTANEOUS at 08:33

## 2023-05-26 RX ADMIN — IPRATROPIUM BROMIDE 0.5 MG: 0.5 SOLUTION RESPIRATORY (INHALATION) at 09:05

## 2023-05-26 RX ADMIN — INSULIN LISPRO 8 UNITS: 100 INJECTION, SOLUTION INTRAVENOUS; SUBCUTANEOUS at 06:31

## 2023-05-26 RX ADMIN — BENZONATATE 100 MG: 100 CAPSULE ORAL at 08:32

## 2023-05-26 RX ADMIN — PREDNISONE 40 MG: 20 TABLET ORAL at 08:32

## 2023-05-26 RX ADMIN — IPRATROPIUM BROMIDE 0.5 MG: 0.5 SOLUTION RESPIRATORY (INHALATION) at 13:15

## 2023-05-26 RX ADMIN — PANTOPRAZOLE SODIUM 40 MG: 40 TABLET, DELAYED RELEASE ORAL at 06:27

## 2023-05-26 RX ADMIN — ARFORMOTEROL TARTRATE 15 MCG: 15 SOLUTION RESPIRATORY (INHALATION) at 09:05

## 2023-05-26 RX ADMIN — BENZONATATE 100 MG: 100 CAPSULE ORAL at 14:15

## 2023-05-26 RX ADMIN — GUAIFENESIN 400 MG: 400 TABLET ORAL at 06:27

## 2023-05-26 RX ADMIN — LEVOTHYROXINE SODIUM 125 MCG: 25 TABLET ORAL at 08:32

## 2023-05-26 RX ADMIN — ATORVASTATIN CALCIUM 20 MG: 20 TABLET, FILM COATED ORAL at 08:32

## 2023-05-26 RX ADMIN — INSULIN LISPRO 16 UNITS: 100 INJECTION, SOLUTION INTRAVENOUS; SUBCUTANEOUS at 17:43

## 2023-05-26 RX ADMIN — DORZOLAMIDE HCL 1 DROP: 20 SOLUTION/ DROPS OPHTHALMIC at 14:15

## 2023-05-26 RX ADMIN — SODIUM CHLORIDE: 9 INJECTION, SOLUTION INTRAVENOUS at 07:08

## 2023-05-26 RX ADMIN — BUDESONIDE 250 MCG: 0.25 SUSPENSION RESPIRATORY (INHALATION) at 09:05

## 2023-05-26 RX ADMIN — INSULIN GLARGINE 20 UNITS: 100 INJECTION, SOLUTION SUBCUTANEOUS at 08:36

## 2023-05-26 NOTE — PLAN OF CARE
Problem: Discharge Planning  Goal: Discharge to home or other facility with appropriate resources  5/26/2023 1706 by Yoni Phipps RN  Outcome: Adequate for Discharge  5/26/2023 1705 by Yoni Phipps RN  Outcome: Adequate for Discharge     Problem: Safety - Adult  Goal: Free from fall injury  5/26/2023 1706 by Yoni Phipps RN  Outcome: Adequate for Discharge  5/26/2023 1705 by Yoni Phipps RN  Outcome: Adequate for Discharge     Problem: Chronic Conditions and Co-morbidities  Goal: Patient's chronic conditions and co-morbidity symptoms are monitored and maintained or improved  5/26/2023 1706 by Yoni Phipps RN  Outcome: Adequate for Discharge  5/26/2023 1705 by Yoni Phipps RN  Outcome: Adequate for Discharge  5/26/2023 4685 by Cash Vazquez RN  Outcome: Progressing     Problem: ABCDS Injury Assessment  Goal: Absence of physical injury  Outcome: Adequate for Discharge

## 2023-05-26 NOTE — PLAN OF CARE
Problem: Chronic Conditions and Co-morbidities  Goal: Patient's chronic conditions and co-morbidity symptoms are monitored and maintained or improved  5/26/2023 0937 by Rashid Urrutia RN  Outcome: Progressing  5/25/2023 2234 by Chelsea Johnston RN  Outcome: Progressing

## 2023-05-26 NOTE — CARE COORDINATION
Will initiate pre-cert to [address] once OT eval done and noted. 7000 done, N-17 initiated, will need transport arranged and form completed.  Will follow-mjo

## 2023-05-26 NOTE — ANESTHESIA POSTPROCEDURE EVALUATION
Department of Anesthesiology  Postprocedure Note    Patient: Tiffany Khan  MRN: [id number]  YOB: 1940  Date of evaluation: 5/25/2023      Procedure Summary     Date: 05/25/23 Room / Location: [place] [address] / [facility]    Anesthesia Start: 7571 Anesthesia Stop: 5029    Procedure: BRONCHOSCOPY DIAGNOSTIC OR CELL [facility] ONLY  ++DR WILL CALL IN AM++ Diagnosis:       Mucus plugging of bronchi      (Mucus plugging of bronchi [T17.500A])    Surgeons: Tiera Alarcon MD Responsible Provider: Alanis Murillo MD    Anesthesia Type: MAC ASA Status: 4          Anesthesia Type: MAC    Matthew Phase I:      Matthew Phase II: Matthew Score: 9      Anesthesia Post Evaluation    Patient location during evaluation: bedside  Patient participation: complete - patient participated  Level of consciousness: awake and alert  Pain score: 3  Airway patency: patent  Nausea & Vomiting: no nausea  Complications: no  Cardiovascular status: blood pressure returned to baseline  Respiratory status: acceptable  Hydration status: euvolemic

## 2023-05-26 NOTE — PLAN OF CARE
Problem: Discharge Planning  Goal: Discharge to home or other facility with appropriate resources  5/25/2023 2234 by Vanna Borges RN  Outcome: Progressing  5/25/2023 0951 by Yelena Chacon RN  Outcome: Progressing     Problem: Safety - Adult  Goal: Free from fall injury  5/25/2023 2234 by Vanna Borges RN  Outcome: Progressing  5/25/2023 0951 by Yelena Chacon RN  Outcome: Progressing     Problem: Chronic Conditions and Co-morbidities  Goal: Patient's chronic conditions and co-morbidity symptoms are monitored and maintained or improved  5/25/2023 2234 by Vanna Borges RN  Outcome: Progressing  5/25/2023 0951 by Yelena Chacon RN  Outcome: Progressing     Problem: ABCDS Injury Assessment  Goal: Absence of physical injury  Outcome: Progressing

## 2023-05-26 NOTE — DISCHARGE SUMMARY
[facility] Physician Discharge Summary       RASHARD [facility]  [address] [facility] [address]  [address]  [phone]    Follow up in 3 week(s)      MD [address], [address] [address] [phone]            Activity level: As tolerated     Dispo: KEITH    Condition on discharge: Stable     Patient ID:  Sue Fox  [id number]  78 y.o.  1940    Admit date: 5/23/2023    Discharge date and time:  5/26/2023  4:55 PM    Admission Diagnoses:  Principal Problem:    Pneumonia due to organism  Active Problems:    COPD with acute exacerbation ([address]    General weakness    Uncontrolled type 2 diabetes mellitus with hyperglycemia (HCC)    COPD exacerbation ([address]    Bacterial pneumonia  Resolved Problems:    * No resolved hospital problems. *      Discharge Diagnoses:   Principal Problem:    Pneumonia due to organism  Active Problems:    COPD with acute exacerbation ([address]    General weakness    Uncontrolled type 2 diabetes mellitus with hyperglycemia (HCC)    COPD exacerbation (HCC)    Bacterial pneumonia  Resolved Problems:    * No resolved hospital problems. *      Consults:  IP CONSULT TO PULMONOLOGY  IP CONSULT TO CASE MANAGEMENT  IP CONSULT TO IV TEAM    Hospital Course:   Patient Sue Fox is a 78 y.o. presented with Pneumonia due to organism [J18.9]  Dyspnea and respiratory abnormalities [R06.00, R06.89]  General weakness [R53.1]  Pneumonia of right lower lobe due to infectious organism [J18.9]  Chronic obstructive pulmonary disease, unspecified COPD type ([address] [J44.9]   Patient presented to the ER with sob. Pulmonology was consulted. She was followed and treated for;    1. Right lower lobe Pneumonia: CAP vs aspiration: Pt presented to the ER with sob. HA and coughing x 1 week. Also feeling very weak. H/o breast cancer s/p radiation and chemo.  She was admitted 5/1/23 to 5/3/23

## 2023-05-26 NOTE — PROGRESS NOTES
1330 ambulated patient in room and out to hallway, pulse ox = 94% on room air
[facility] Progress Note    Admitting Date and Time: 5/23/2023 12:57 PM  Admit Dx: Pneumonia due to organism [J18.9]  Dyspnea and respiratory abnormalities [R06.00, R06.89]  General weakness [R53.1]  Pneumonia of right lower lobe due to infectious organism [J18.9]  Chronic obstructive pulmonary disease, unspecified COPD type ([address] [J44.9]    Subjective:  Patient is being followed for Pneumonia due to organism [J18.9]  Dyspnea and respiratory abnormalities [R06.00, R06.89]  General weakness [R53.1]  Pneumonia of right lower lobe due to infectious organism [J18.9]  Chronic obstructive pulmonary disease, unspecified COPD type ([address] [J44.9]     Seen at bedside. Daughter present. She is awake and alert, afebrile. Feels shortness of breath is better, no wheezing. On room air. Tolerating medications. Concerned about bronchoscopy and sugars. Long discussion had with patient, questions answered. ROS: denies fever, chills, cp, sob, n/v, HA unless stated above.       methylPREDNISolone  40 mg IntraVENous Q12H    benzonatate  100 mg Oral TID    insulin lispro  0-8 Units SubCUTAneous TID WC    insulin lispro  0-4 Units SubCUTAneous Nightly    sodium chloride flush  5-40 mL IntraVENous 2 times per day    enoxaparin  40 mg SubCUTAneous Daily    levoFLOXacin  500 mg Oral Daily    guaiFENesin  400 mg Oral 3 times per day    dorzolamide  1 drop Both Eyes TID    latanoprost  1 drop Right Eye Nightly    levothyroxine  125 mcg Oral Daily    pantoprazole  40 mg Oral QAM AC    atorvastatin  20 mg Oral Daily    fluticasone  2 spray Each Nostril Daily    budesonide  0.25 mg Nebulization BID    And    arformoterol tartrate  15 mcg Nebulization BID    And    ipratropium  0.5 mg Nebulization 4x daily     albuterol, 2.5 mg, Q4H PRN  sodium chloride flush, 5-40 mL, PRN  sodium chloride, , PRN  ondansetron, 4 mg, Q8H PRN   Or  ondansetron, 4 mg, Q6H PRN  polyethylene glycol, 17 g, Daily PRN  acetaminophen, 650 mg, Q6H
Date: 5/26/2023    Time: 12:43 AM    Patient Placed On BIPAP/CPAP/ Non-Invasive Ventilation? Patient continues on bipap    If no must comment. Facial area red/color change? No           If YES are Blister/Lesion present? No   If yes must notify nursing staff  BIPAP/CPAP skin barrier?   Yes    Skin barrier type:mepilexlite       Comments:        Azucena Lundy RCP
[facility] Progress Note    Admitting Date and Time: 5/23/2023 12:57 PM  Admit Dx: Pneumonia due to organism [J18.9]  Dyspnea and respiratory abnormalities [R06.00, R06.89]  General weakness [R53.1]  Pneumonia of right lower lobe due to infectious organism [J18.9]  Chronic obstructive pulmonary disease, unspecified COPD type ([address] [J44.9]    Subjective:  Patient is being followed for Pneumonia due to organism [J18.9]  Dyspnea and respiratory abnormalities [R06.00, R06.89]  General weakness [R53.1]  Pneumonia of right lower lobe due to infectious organism [J18.9]  Chronic obstructive pulmonary disease, unspecified COPD type ([facility] [J44.9]     Seen and examined at bedside. Awake and alert. Afebrile. Daughter at bedside. Concern over blood sugar. Tolerating medications, no side effects. She is on room air.      ROS: denies fever, chills, cp, sob, n/v, HA unless stated above.      sodium chloride flush  5-40 mL IntraVENous 2 times per day    enoxaparin  40 mg SubCUTAneous Daily    albuterol  2.5 mg Nebulization Q4H WA    levoFLOXacin  500 mg Oral Daily    methylPREDNISolone  40 mg IntraVENous Daily    insulin lispro  0-4 Units SubCUTAneous TID WC    insulin lispro  0-4 Units SubCUTAneous Nightly    guaiFENesin  400 mg Oral 3 times per day    dorzolamide  1 drop Both Eyes TID    latanoprost  1 drop Right Eye Nightly    levothyroxine  125 mcg Oral Daily    pantoprazole  40 mg Oral QAM AC    atorvastatin  20 mg Oral Daily    fluticasone  2 spray Each Nostril Daily    budesonide  0.25 mg Nebulization BID    And    arformoterol tartrate  15 mcg Nebulization BID    And    ipratropium  0.5 mg Nebulization 4x daily     sodium chloride flush, 5-40 mL, PRN  sodium chloride, , PRN  ondansetron, 4 mg, Q8H PRN   Or  ondansetron, 4 mg, Q6H PRN  polyethylene glycol, 17 g, Daily PRN  acetaminophen, 650 mg, Q6H PRN   Or  acetaminophen, 650 mg, Q6H PRN  albuterol, 2.5 mg, Q2H PRN  benzonatate, 100 mg, TID PRN  glucose,
No concern for TB.      Electronically signed by Baldo Flannery MD on 5/26/2023 at 8:58 AM
Nurse to Nurse report called to Jayda at [facility].
Occupational Therapy  OCCUPATIONAL THERAPY INITIAL EVALUATION  [facility] [address]    Date: 2023     Patient Name: César Servin  MRN: [id number]  : 1940  Room: [address] [place] [address]    Evaluating OT: Joy Mcghee JASON Carlisle Cousin, OTR/L - [id number]    Referring Provider: Otto Buerger, APRN - CNP  Specific Provider Orders/Date: \"OT eval and treat\" - 2023    Diagnosis: Pneumonia due to organism [J18.9]  Dyspnea and respiratory abnormalities [R06.00, R06.89]  General weakness [R53.1]  Pneumonia of right lower lobe due to infectious organism [J18.9]  Chronic obstructive pulmonary disease, unspecified COPD type ([facility] [J44.9]     Pertinent Medical History: COPD, OA, DM, breast cancer, glaucoma     Precautions: fall risk, skin integrity, bed/chair alarms    Assessment of Current Deficits:    [x] Functional mobility   [x] ADLs  [x] Strength               [x] Cognition   [x] Functional transfers   [x] IADLs         [x] Safety Awareness   [x] Endurance   [] Fine Motor Coordination  [x] Balance      [] Vision/Perception   [x] Sensation    [] Gross Motor Coordination [] ROM          [] Delirium                  [] Motor Control     OT PLAN OF CARE   OT POC is based on physician orders, patient diagnosis, and results of clinical assessment.   Frequency/Duration 2-5 days/week for 2 weeks PRN   Specific OT Treatment Interventions to Include:   * Instruction/training on adapted ADL techniques and AE recommendations to increase functional independence within precautions       * Training on energy conservation strategies, correct breathing pattern and techniques to improve independence/tolerance for self-care routine  * Functional transfer/mobility training/DME recommendations for increased independence, safety, and fall prevention  * Patient/Family education to increase follow through with safety techniques and functional independence  * Recommendation
Occupational Therapy  OT BEDSIDE TREATMENT NOTE      Date:2023  Patient Name: Bernadette Walter  MRN: [id number]  : 1940  Room: [address] [place] [address]     Per OT Eval:      Evaluating OT: Susi Gbariel, OTR/L - [id number]     Referring Provider: LIDIA Plaza - CNP  Specific Provider Orders/Date: \"OT eval and treat\" - 2023     Diagnosis: Pneumonia due to organism [J18.9]  Dyspnea and respiratory abnormalities [R06.00, R06.89]  General weakness [R53.1]  Pneumonia of right lower lobe due to infectious organism [J18.9]  Chronic obstructive pulmonary disease, unspecified COPD type ([facility] [J44.9]      Pertinent Medical History: COPD, OA, DM, breast cancer, glaucoma      Precautions: fall risk, skin integrity, bed/chair alarms     Assessment of Current Deficits:    [x] Functional mobility             [x] ADLs          [x] Strength                  [x] Cognition   [x] Functional transfers           [x] IADLs         [x] Safety Awareness   [x] Endurance   [] Fine Motor Coordination    [x] Balance      [] Vision/Perception   [x] Sensation    [] Gross Motor Coordination [] ROM          [] Delirium                  [] Motor Control      OT PLAN OF CARE   OT POC is based on physician orders, patient diagnosis, and results of clinical assessment.   Frequency/Duration 2-5 days/week for 2 weeks PRN   Specific OT Treatment Interventions to Include:   * Instruction/training on adapted ADL techniques and AE recommendations to increase functional independence within precautions       * Training on energy conservation strategies, correct breathing pattern and techniques to improve independence/tolerance for self-care routine  * Functional transfer/mobility training/DME recommendations for increased independence, safety, and fall prevention  * Patient/Family education to increase follow through with safety techniques and functional independence  * Recommendation of environmental modifications for increased safety with
Physical Therapy  Facility/Department: [address] INTERNAL MEDICINE 2  Physical Therapy Initial Assessment    Name: Radha Corral  : 1940  MRN: [id number]  Date of Service: 2023      Patient Diagnosis(es): The primary encounter diagnosis was Pneumonia of right lower lobe due to infectious organism. Diagnoses of Dyspnea and respiratory abnormalities, General weakness, and Chronic obstructive pulmonary disease, unspecified COPD type ([facility] were also pertinent to this visit. Past Medical History:  has a past medical history of Anesthesia, Breast cancer ([facility] COPD with exacerbation ([facility] Glaucoma, History of colon polyps, History of therapeutic radiation, Hx antineoplastic chemo, Hyperlipidemia, Hypothyroidism, Osteoarthritis, Pneumonia, and Type 2 diabetes mellitus. Past Surgical History:  has a past surgical history that includes Hysterectomy; other surgical history (12); Appendectomy; Colonoscopy (2013); Colonoscopy ([id number]); eye surgery; Breast surgery; and Breast lumpectomy (2012). Evaluating Therapist: Isaac Hendrickson PT  Equipment recommendation: wheeled walker    Room #:  7355/4395-R  Diagnosis:  Pneumonia due to organism [J18.9]  Dyspnea and respiratory abnormalities [R06.00, R06.89]  General weakness [R53.1]  Pneumonia of right lower lobe due to infectious organism [J18.9]  Chronic obstructive pulmonary disease, unspecified COPD type ([facility] [J44.9]  PMHx/PSHx:  COPD, Breast CA  Precautions:  falls, alarm      Social:  Pt lives with family in a 2 floor plan stair glide to second floor. Independent without device. Initial Evaluation  Date: 23 Treatment      Short Term/ Long Term   Goals   Was pt agreeable to Eval/treatment? yes     Does pt have pain?  No c/o pain     Bed Mobility  Rolling: SBA  Supine to sit: SBA  Sit to supine: min assist  Scooting: SBA  independent   Transfers Sit to stand: CGA  Stand to sit: CGA  Stand pivot: CGA  supervision   Ambulation    35 feet
Received call from infection control this morning in regards to patient's Rule Out TB order since bronch yesterday. Was asked to reach out to pulmonary to confirm we are not concerned for TB and if they can document that in their note today. Reach out via [employer] to Dr. Lasha Doran regarding the above.          Delfino Block RN
SPEECH/LANGUAGE PATHOLOGY  CLINICAL ASSESSMENT OF SWALLOWING FUNCTION   and PLAN OF CARE    PATIENT NAME:  Nate Mullen  (female)     MRN:  [id number]    :  1940  (78 y.o.)  STATUS:  Inpatient: Room 0411/0411-A    TODAY'S DATE:  2023  REFERRING PROVIDER:   23 [id number]    SLP swallowing-dysphagia evaluation and treatment  Start:  23 1345,   End:  23 1345,   ONE TIME,   Standing Count:  1 Occurrences,   Jonatan Mcguire MD   REASON FOR REFERRAL: assess for aspiration   EVALUATING THERAPIST: SALO Louie                 RESULTS:    DYSPHAGIA DIAGNOSIS:   Clinical indicators of suspected pharyngeal and/or esophageal phase dysphagia     Pt c/o difficulty swallowing including coughing on liquids and own secretions. Also c/o food/liquids getting stuck in esophagus. CT chest reviewed that could not rule out chronic aspiration. Recommend a video swallow eval to further assess. DIET RECOMMENDATIONS:  to be determined following MBS     FEEDING RECOMMENDATIONS:     Assistance level:  No assistance needed      Compensatory strategies recommended: Small bites/sips and Alternate solids and liquids      Discussed recommendations with nursing and/or faxed report to referring provider: Yes    SPEECH THERAPY  PLAN OF CARE   The dysphagia POC is established based on physician order, dysphagia diagnosis and results of clinical assessment     Will establish POC once MBSS is completed. Conditions Requiring Skilled Therapeutic Intervention for dysphagia:    Patient is performing below functional baseline d/t  current acute condition, respiratory compromise, multiple medications, and/or increased dependency upon caregivers.     Specific dysphagia interventions to include:     MBSS to fully assess oropharyngeal swallow function and to assist in determining the least restrictive PO diet to maintain adequate nutrition/hydration     Specific instructions for next treatment:  MBSS to be
Speech Language Pathology      NAME:  Vanessa Casey  :  1940  DATE: 2023  ROOM:  [facility]2048-N         Order received. Chart reviewed. Attempted to complete a clinical swallow eval in AM.     Pt unavailable at this time due to:  [x] HOLD per RN due to NPO for procedure  [] Off unit for testing/ procedure    [] With medical staff   [] Declined intervention  [] Sleeping/ Lethargic   [] Other:       Will re-attempt as able. Thank you. Pneumonia due to organism [J18.9]  Dyspnea and respiratory abnormalities [R06.00, R06.89]  General weakness [R53.1]  Pneumonia of right lower lobe due to infectious organism [J18.9]  Chronic obstructive pulmonary disease, unspecified COPD type ([facility] [J44.9]            Charbel PENA CCC/SLP [id number]  Speech-Language Pathologist
Daily PRN  acetaminophen, 650 mg, Q6H PRN   Or  acetaminophen, 650 mg, Q6H PRN  glucose, 4 tablet, PRN  dextrose bolus, 125 mL, PRN   Or  dextrose bolus, 250 mL, PRN  glucagon (rDNA), 1 mg, PRN  dextrose, , Continuous PRN         Objective:    BP (!) 143/70   Pulse 82   Temp 97.5 °F (36.4 °C)   Resp 18   Ht 5' 2\" (1.575 m)   Wt 171 lb 12.8 oz (77.9 kg)   SpO2 95%   BMI 31.42 kg/m²   General Appearance: alert and oriented to person, place and time and in no acute distress  Skin: warm and dry  Head: normocephalic and atraumatic  Neck: neck supple and non tender without mass   Pulmonary/Chest: clear to auscultation bilaterally-   Cardiovascular: normal rate, normal S1 and S2 and no carotid bruits  Abdomen: soft, non-tender, non-distended,   Extremities: no cyanosis, no clubbing and no edema  Neurologic:speech normal         Recent Labs     05/24/23  0915 05/25/23  0445 05/26/23  0415    137 136   K 4.4 4.6 4.5    105 104   CO2 24 23 24   BUN 8 13 14   CREATININE 0.5 0.6 0.5   GLUCOSE 264* 323* 293*   CALCIUM 10.0 9.9 9.6       Recent Labs     05/24/23  0915 05/25/23  0445 05/26/23  0415   WBC 6.5 8.4 11.2   RBC 4.21 4.12 3.94   HGB 12.9 12.6 12.2   HCT 38.2 37.3 35.7   MCV 90.7 90.5 90.6   MCH 30.6 30.6 31.0   MCHC 33.8 33.8 34.2   RDW 12.2 12.2 12.4    249 245   MPV 11.5 11.7 11.5         Assessment:    Principal Problem:    Pneumonia due to organism  Active Problems:    COPD with acute exacerbation (HCC)    General weakness    Uncontrolled type 2 diabetes mellitus with hyperglycemia (HCC)    COPD exacerbation (HCC)    Bacterial pneumonia  Resolved Problems:    * No resolved hospital problems. *      Plan:  1. Right lower lobe Pneumonia: CAP vs aspiration: Pt presented to the ER with sob. HA and coughing x 1 week. Also feeling very weak. H/o breast cancer s/p radiation and chemo. She was admitted 5/1/23 to 5/3/23 for COPD exacerbation, followed by pulmonology and dc on prednisone taper.
compensatory swallowing strategies on 90% of opportunities or greater to improve airway protection and swallow function. Long Term Goals:   Pt will maintain adequate nutrition/hydration via PO intake of the least restrictive oral diet with implementation of safe swallow/ compensatory strategies and decrease signs/symptoms of aspiration to less than 1 x/day. Patient/family Goal:    Did not state. Will further assess during treatment. Plan of care discussed with Patient   The Patient understand(s) the diagnosis, prognosis and plan of care     Rehabilitation Potential/Prognosis: good                      ADMITTING DIAGNOSIS: Pneumonia due to organism [J18.9]  Dyspnea and respiratory abnormalities [R06.00, R06.89]  General weakness [R53.1]  Pneumonia of right lower lobe due to infectious organism [J18.9]  Chronic obstructive pulmonary disease, unspecified COPD type ([address] [J44.9]     VISIT DIAGNOSIS:   Visit Diagnoses         Codes    Dyspnea and respiratory abnormalities     R06.00, R06.89    General weakness     R53.1    Chronic obstructive pulmonary disease, unspecified COPD type ([address]     J44.9    Mucus plugging of bronchi     T17.500A                PATIENT REPORT/COMPLAINT: coughing with all consistencies    PRIOR LEVEL OF SWALLOW FUNCTION:    Past History of Dysphagia?:  yes    Home diet: Regular consistency solids (IDDSI level 7) with  thin liquids (IDDSI level 0)  Current Diet Order:  ADULT DIET; Regular; 3 carb choices (45 gm/meal);  Low Fat/Low Chol/High Fiber/SHASTA    PROCEDURE:  Consistencies Administered During the Evaluation   Liquids: thin liquid and nectar thick liquid   Solids:  pureed foods and solid foods      Method of Intake:   cup, straw, spoon  Self fed      Position:   Seated, upright, Lateral plane    INSTRUMENTAL ASSESSMENT:    ORAL PREP/ ORAL PHASE:    The oral stage of swallowing was within functional limits for consistencies administered     PHARYNGEAL PHASE:     ONSET TIME
endobronchial process with stenosis and obliteration of the  central lumen of segment and subsegmental bronchi of the right lower lobe,  limited towards the left lower lobe. 4.  Although the findings could be relate with chronic aspiration, can  consider consultation with Pneumology, as the patient could potential benefit  from further evaluation with bronchoscopy. .     Echo 8/22/21:  Summary   Tachycardia noted. Normal left ventricular size. LV systolic function is hyperdynamic. Ejection fraction is visually estimated at 75-80%. There is near obliteration of the LV cavity during systole. There appears to be an intracavitary gradient approximately 20 mmHg. Indeterminate diastolic function. No regional wall motion abnormalities seen. Normal left ventricular wall thickness. Normal right ventricular size and function. No significant valvular abnormalities. Labs:  Lab Results   Component Value Date/Time    WBC 11.2 05/26/2023 04:15 AM    HGB 12.2 05/26/2023 04:15 AM    HCT 35.7 05/26/2023 04:15 AM    MCV 90.6 05/26/2023 04:15 AM    MCH 31.0 05/26/2023 04:15 AM    MCHC 34.2 05/26/2023 04:15 AM    RDW 12.4 05/26/2023 04:15 AM     05/26/2023 04:15 AM    MPV 11.5 05/26/2023 04:15 AM     Lab Results   Component Value Date/Time     05/26/2023 04:15 AM    K 4.5 05/26/2023 04:15 AM     05/26/2023 04:15 AM    CO2 24 05/26/2023 04:15 AM    BUN 14 05/26/2023 04:15 AM    CREATININE 0.5 05/26/2023 04:15 AM    LABALBU 3.9 05/23/2023 03:35 PM    CALCIUM 9.6 05/26/2023 04:15 AM    GFRAA >60 10/03/2022 08:51 AM    LABGLOM >60 05/26/2023 04:15 AM     Lab Results   Component Value Date/Time    PROTIME 12.7 05/23/2023 03:35 PM    INR 1.1 05/23/2023 03:35 PM     Recent Labs     05/23/23  1535   PROBNP 85       Recent Labs     05/24/23  0915   PROCAL 0.05       This SmartLink has not been configured with any valid records.        Micro:  Recent Labs     05/24/23  0820   CULTRESP Oral Pharyngeal
Review of CNP documentation was conducted and revisions were made as appropriate.  I agree with the above documented exam, problem list and plan of care with the following additions:    Continue antibiotics, bronchodilators, steroids  For bronchoscopy airway inspection pending endo availability     Owen Sanches MD

## 2023-05-26 NOTE — CARE COORDINATION
Social Work:    Princess Stoner was arranged to transport Mrs. Jin to [facility] today at 5:30-6:00 p.m. Social work updated patient, daughter, Jenni Dupree at [facility].     Electronically signed by STACI Kendrick on 5/26/2023 at 4:28 PM

## 2023-05-28 LAB
ACID FAST STN SPEC QL: NORMAL
BACTERIA SPEC RESP CULT: NORMAL
SMEAR, RESPIRATORY: NORMAL

## 2023-06-05 ENCOUNTER — TELEPHONE (OUTPATIENT)
Dept: FAMILY MEDICINE CLINIC | Age: 83
End: 2023-06-05

## 2023-06-05 NOTE — TELEPHONE ENCOUNTER
Patient being D/C 06/08/23 to [facility] KEIRA @ [facility] will you follow for home PT?

## 2023-06-06 LAB
ACID FAST STN SPEC QL: NORMAL
MYCOBACTERIUM SPEC CULT: NORMAL

## 2023-06-06 NOTE — TELEPHONE ENCOUNTER
Left message notifying [facility] and instructing them to call the office with any questions or concerns.

## 2023-06-07 ENCOUNTER — TELEPHONE (OUTPATIENT)
Dept: FAMILY MEDICINE CLINIC | Age: 83
End: 2023-06-07

## 2023-06-07 NOTE — TELEPHONE ENCOUNTER
Care Transitions Initial Follow Up Call    Outreach made within 2 business days of discharge: Yes    Patient: Guera Gutierrez Patient : 1940   MRN: [id number]  Reason for Admission: COPD  Discharge Date: 23       Spoke with: Guera Gutierrez     Discharge department/facility: D/C expected 23 to [facility] d/c:23   [facility] d/c: 23  Will be discharged to [facility]     TCM Interactive Patient Contact:  Was patient able to fill all prescriptions: Yes  Was patient instructed to bring all medications to the follow-up visit: Yes  Is patient taking all medications as directed in the discharge summary? Yes  Does patient understand their discharge instructions: Yes  Does patient have questions or concerns that need addressed prior to 7-14 day follow up office visit: no    Patient is being discharged from [facility] tomorrow. She is planning to go to [facility]. She feels good is a little anxious about all the changes that will be made but understands the reasoning and is accepting. Patient concerned she will not be able to drive herself to appts but called daughter and verifed she will be able to bring her to follow up appts as needed. Follow up appt with PCP is scheduled.      Scheduled appointment with PCP within 7-14 days    Follow Up  Future Appointments   Date Time Provider Samira Chino   2023  1:30 PM Ruy Hoskins MD [facility]   2023 10:30 AM LIDIA Alberto - CNP [facility]   2023 11:30 AM Ruy Hoskins MD [facility]   2023  8:30 AM Ruy Hoskins MD [address], RN

## 2023-06-12 ENCOUNTER — OFFICE VISIT (OUTPATIENT)
Dept: FAMILY MEDICINE CLINIC | Age: 83
End: 2023-06-12
Payer: MEDICARE

## 2023-06-12 VITALS
HEART RATE: 121 BPM | DIASTOLIC BLOOD PRESSURE: 70 MMHG | SYSTOLIC BLOOD PRESSURE: 120 MMHG | RESPIRATION RATE: 18 BRPM | OXYGEN SATURATION: 95 % | WEIGHT: 174.4 LBS | TEMPERATURE: 97.1 F | BODY MASS INDEX: 31.9 KG/M2

## 2023-06-12 DIAGNOSIS — J96.01 ACUTE RESPIRATORY FAILURE WITH HYPOXIA (HCC): ICD-10-CM

## 2023-06-12 DIAGNOSIS — T17.500A MUCUS PLUGGING OF BRONCHI: Primary | ICD-10-CM

## 2023-06-12 DIAGNOSIS — J44.1 COPD EXACERBATION (HCC): ICD-10-CM

## 2023-06-12 DIAGNOSIS — J43.8 OTHER EMPHYSEMA (HCC): ICD-10-CM

## 2023-06-12 DIAGNOSIS — E11.65 UNCONTROLLED TYPE 2 DIABETES MELLITUS WITH HYPERGLYCEMIA (HCC): ICD-10-CM

## 2023-06-12 DIAGNOSIS — E78.2 MIXED HYPERLIPIDEMIA: ICD-10-CM

## 2023-06-12 DIAGNOSIS — Z09 HOSPITAL DISCHARGE FOLLOW-UP: ICD-10-CM

## 2023-06-12 DIAGNOSIS — E03.9 ACQUIRED HYPOTHYROIDISM: ICD-10-CM

## 2023-06-12 PROCEDURE — 1111F DSCHRG MED/CURRENT MED MERGE: CPT | Performed by: FAMILY MEDICINE

## 2023-06-12 PROCEDURE — 1123F ACP DISCUSS/DSCN MKR DOCD: CPT | Performed by: FAMILY MEDICINE

## 2023-06-12 PROCEDURE — 99214 OFFICE O/P EST MOD 30 MIN: CPT | Performed by: FAMILY MEDICINE

## 2023-06-12 PROCEDURE — 3046F HEMOGLOBIN A1C LEVEL >9.0%: CPT | Performed by: FAMILY MEDICINE

## 2023-06-20 LAB
ACID FAST STN SPEC QL: NORMAL
MYCOBACTERIUM SPEC CULT: NORMAL

## 2023-06-23 DIAGNOSIS — K21.9 GASTROESOPHAGEAL REFLUX DISEASE WITHOUT ESOPHAGITIS: Primary | ICD-10-CM

## 2023-06-23 RX ORDER — OMEPRAZOLE 20 MG/1
40 CAPSULE, DELAYED RELEASE ORAL DAILY
Qty: 90 CAPSULE | Refills: 1 | Status: SHIPPED | OUTPATIENT
Start: 2023-06-23

## 2023-06-23 NOTE — TELEPHONE ENCOUNTER
Medication Refill Request    LOV 6/12/2023  NOV 7/14/2023    Lab Results   Component Value Date    CREATININE 0.5 05/26/2023

## 2023-06-26 DIAGNOSIS — K21.9 GASTROESOPHAGEAL REFLUX DISEASE WITHOUT ESOPHAGITIS: ICD-10-CM

## 2023-06-27 LAB
ACID FAST STN SPEC QL: NORMAL
MYCOBACTERIUM SPEC CULT: NORMAL

## 2023-06-27 RX ORDER — OMEPRAZOLE 20 MG/1
40 CAPSULE, DELAYED RELEASE ORAL DAILY
Qty: 90 CAPSULE | Refills: 1 | Status: SHIPPED | OUTPATIENT
Start: 2023-06-27

## 2023-07-04 LAB
ACID FAST STN SPEC QL: NORMAL
MYCOBACTERIUM SPEC CULT: NORMAL

## 2023-07-11 LAB
ACID FAST STN SPEC QL: NORMAL
MYCOBACTERIUM SPEC CULT: NORMAL

## 2023-07-26 DIAGNOSIS — E03.9 ACQUIRED HYPOTHYROIDISM: ICD-10-CM

## 2023-07-26 RX ORDER — LEVOTHYROXINE SODIUM 0.12 MG/1
125 TABLET ORAL DAILY
Qty: 90 TABLET | Refills: 1 | Status: SHIPPED
Start: 2023-07-26 | End: 2023-07-26 | Stop reason: SDUPTHER

## 2023-07-26 RX ORDER — LEVOTHYROXINE SODIUM 0.12 MG/1
125 TABLET ORAL DAILY
Qty: 90 TABLET | Refills: 1 | Status: SHIPPED | OUTPATIENT
Start: 2023-07-26

## 2023-07-26 NOTE — TELEPHONE ENCOUNTER
Medication Refill Request    LOV 6/12/2023  NOV 8/2/2023    Lab Results   Component Value Date    CREATININE 0.5 05/26/2023

## 2023-07-26 NOTE — TELEPHONE ENCOUNTER
Mark from the [facility] at [facility] called and requested a refill of levothyroxine sent to [facility]

## 2023-08-02 ENCOUNTER — OFFICE VISIT (OUTPATIENT)
Dept: FAMILY MEDICINE CLINIC | Age: 83
End: 2023-08-02
Payer: MEDICARE

## 2023-08-02 ENCOUNTER — TELEPHONE (OUTPATIENT)
Dept: FAMILY MEDICINE CLINIC | Age: 83
End: 2023-08-02

## 2023-08-02 VITALS
HEART RATE: 99 BPM | DIASTOLIC BLOOD PRESSURE: 70 MMHG | SYSTOLIC BLOOD PRESSURE: 130 MMHG | OXYGEN SATURATION: 96 % | WEIGHT: 175.4 LBS | RESPIRATION RATE: 18 BRPM | BODY MASS INDEX: 30.58 KG/M2 | TEMPERATURE: 97 F

## 2023-08-02 DIAGNOSIS — E03.9 ACQUIRED HYPOTHYROIDISM: ICD-10-CM

## 2023-08-02 DIAGNOSIS — Z85.118 HISTORY OF PRIMARY MALIGNANT NEOPLASM OF LEFT LUNG: ICD-10-CM

## 2023-08-02 DIAGNOSIS — J43.8 OTHER EMPHYSEMA (HCC): ICD-10-CM

## 2023-08-02 DIAGNOSIS — E11.65 UNCONTROLLED TYPE 2 DIABETES MELLITUS WITH HYPERGLYCEMIA (HCC): Primary | ICD-10-CM

## 2023-08-02 DIAGNOSIS — E78.2 MIXED HYPERLIPIDEMIA: ICD-10-CM

## 2023-08-02 PROBLEM — J44.1 COPD EXACERBATION (HCC): Status: RESOLVED | Noted: 2023-05-24 | Resolved: 2023-08-02

## 2023-08-02 PROBLEM — J15.9 BACTERIAL PNEUMONIA: Status: RESOLVED | Noted: 2023-05-24 | Resolved: 2023-08-02

## 2023-08-02 PROBLEM — J44.1 COPD WITH ACUTE EXACERBATION (HCC): Status: RESOLVED | Noted: 2023-05-01 | Resolved: 2023-08-02

## 2023-08-02 PROBLEM — J18.9 PNEUMONIA DUE TO ORGANISM: Status: RESOLVED | Noted: 2023-05-23 | Resolved: 2023-08-02

## 2023-08-02 PROBLEM — C34.92 ADENOCARCINOMA, LUNG, LEFT (HCC): Status: RESOLVED | Noted: 2018-03-09 | Resolved: 2023-08-02

## 2023-08-02 PROBLEM — R53.1 GENERAL WEAKNESS: Status: RESOLVED | Noted: 2023-05-23 | Resolved: 2023-08-02

## 2023-08-02 LAB — HBA1C MFR BLD: 7.4 %

## 2023-08-02 PROCEDURE — 99214 OFFICE O/P EST MOD 30 MIN: CPT | Performed by: FAMILY MEDICINE

## 2023-08-02 PROCEDURE — 1123F ACP DISCUSS/DSCN MKR DOCD: CPT | Performed by: FAMILY MEDICINE

## 2023-08-02 PROCEDURE — 83037 HB GLYCOSYLATED A1C HOME DEV: CPT | Performed by: FAMILY MEDICINE

## 2023-08-02 PROCEDURE — 3051F HG A1C>EQUAL 7.0%<8.0%: CPT | Performed by: FAMILY MEDICINE

## 2023-08-02 RX ORDER — BLOOD-GLUCOSE METER
1 EACH MISCELLANEOUS DAILY
Qty: 1 KIT | Refills: 0 | Status: SHIPPED | OUTPATIENT
Start: 2023-08-02

## 2023-08-02 RX ORDER — LANCETS
1 EACH MISCELLANEOUS 4 TIMES DAILY
Qty: 100 EACH | Refills: 1 | Status: SHIPPED | OUTPATIENT
Start: 2023-08-02

## 2023-08-02 RX ORDER — INSULIN GLARGINE 100 [IU]/ML
15 INJECTION, SOLUTION SUBCUTANEOUS DAILY
Qty: 10 ML | Refills: 3 | Status: SHIPPED
Start: 2023-08-02

## 2023-08-02 RX ORDER — BLOOD SUGAR DIAGNOSTIC
1 STRIP MISCELLANEOUS DAILY
Qty: 100 EACH | Refills: 1 | Status: SHIPPED | OUTPATIENT
Start: 2023-08-02

## 2023-08-02 ASSESSMENT — ENCOUNTER SYMPTOMS
SHORTNESS OF BREATH: 0
COUGH: 0
CHEST TIGHTNESS: 0
GASTROINTESTINAL NEGATIVE: 1
ALLERGIC/IMMUNOLOGIC NEGATIVE: 1
WHEEZING: 0
EYES NEGATIVE: 1

## 2023-08-02 NOTE — TELEPHONE ENCOUNTER
Rosa Isela from [employer] called stating accucheck meter and supplies were sent over for the patient. The sig on the meter and strips says test QD PRN, the sig on the lancets says to test QID.  Pharmacist would like to know how many days you would like the patient to test. Please advise thanks

## 2023-08-02 NOTE — PROGRESS NOTES
dcrease lantus and recheck 3 mos    Reviewed labs and meds  Stable, cont meds recheck 6 mos    - POCT glycosylated hemoglobin (Hb A1C)  - CBC with Auto Differential; Future  - Comprehensive Metabolic Panel; Future  - Blood Glucose Monitoring Suppl (ACCU-CHEK GUIDE ME) w/Device KIT; 1 each by Does not apply route daily  Dispense: 1 kit; Refill: 0  - blood glucose test strips (ACCU-CHEK GUIDE) strip; 1 each by In Vitro route daily As needed. Dispense: 100 each; Refill: 1  - Accu-Chek Softclix Lancets MISC; 1 each by Does not apply route 4 times daily  Dispense: 100 each; Refill: 1    2. History of primary malignant neoplasm of left lung  Follows with pulmonary on this    3. Acquired hypothyroidism  Lab Results   Component Value Date    TSH 0.687 03/29/2023     Will re evaluate this before next vist    Reviewed labs and meds  Stable, cont meds recheck 6 mos    - TSH; Future    4. Other emphysema ([address])  *recent hospitalization for RLL pneumonia. She was found to have thick inspissated secretions in the RLL and dynamic airway collapse. She was treated with Levaquin and steroids  Required a scope to remove secretions  Breathin is goo so far  Is on albuterol and trelegy    Reviewed labs and meds  Stable, cont meds recheck 6 mos    5. Mixed hyperlipidemia  **tc 178  tg 124  hdl 54  ldl 99    - Lipid Panel; Future  - TSH; Future    Wt Readings from Last 3 Encounters:   08/02/23 175 lb 6.4 oz (79.6 kg)   06/20/23 174 lb 3.2 oz (79 kg)   06/12/23 174 lb 6.4 oz (79.1 kg)               SUBJECTIVE    Review of Systems   Constitutional:  Negative for activity change, appetite change, fatigue and unexpected weight change. HENT:  Negative for congestion. Eyes: Negative. Negative for visual disturbance. Respiratory:  Negative for cough, chest tightness, shortness of breath and wheezing. Cardiovascular:  Positive for leg swelling. Negative for chest pain and palpitations. Gastrointestinal: Negative.     Endocrine:

## 2023-08-22 DIAGNOSIS — K21.9 GASTROESOPHAGEAL REFLUX DISEASE WITHOUT ESOPHAGITIS: ICD-10-CM

## 2023-08-22 RX ORDER — OMEPRAZOLE 20 MG/1
40 CAPSULE, DELAYED RELEASE ORAL DAILY
Qty: 90 CAPSULE | Refills: 1 | Status: SHIPPED | OUTPATIENT
Start: 2023-08-22

## 2023-08-22 NOTE — TELEPHONE ENCOUNTER
Medication Refill Request    LOV 8/2/2023  NOV 11/27/2023    Lab Results   Component Value Date    CREATININE 0.5 05/26/2023

## 2023-09-07 RX ORDER — INSULIN GLARGINE 100 [IU]/ML
15 INJECTION, SOLUTION SUBCUTANEOUS DAILY
Qty: 3 ML | Refills: 5 | Status: SHIPPED
Start: 2023-09-07 | End: 2023-09-08 | Stop reason: SDUPTHER

## 2023-09-08 ENCOUNTER — TELEPHONE (OUTPATIENT)
Dept: FAMILY MEDICINE CLINIC | Age: 83
End: 2023-09-08

## 2023-09-08 RX ORDER — INSULIN GLARGINE 100 [IU]/ML
15 INJECTION, SOLUTION SUBCUTANEOUS DAILY
Qty: 3 ML | Refills: 5 | Status: SHIPPED | OUTPATIENT
Start: 2023-09-08

## 2023-09-08 NOTE — TELEPHONE ENCOUNTER
[facility] called and left a message. The Rx for lantus vials is a qty of 3mL. The vials only come in 10mL. They want to verify it is vials vs. pens that is being ordered. Spoke with Dr. Tasha Rangel. Called and confirmed with the pharmacy that 10mL vials is correct.

## 2023-09-08 NOTE — TELEPHONE ENCOUNTER
Pt needs refill:    Lantus    Pharmacy: [facility] in [address] [place] [address] as she is currently out

## 2023-09-08 NOTE — TELEPHONE ENCOUNTER
Medication refill request sent to Dr. Valery Harris to send to [facility] in [place]

## 2023-09-08 NOTE — TELEPHONE ENCOUNTER
Medication that was sent into the RX institutional pharmacy is not the correct phamacy that was sent yesterday. Pt states it needs to go to [facility] in [place]. Please advise.

## 2023-09-11 DIAGNOSIS — Z12.31 SCREENING MAMMOGRAM FOR BREAST CANCER: Primary | ICD-10-CM

## 2023-09-11 DIAGNOSIS — C34.92 ADENOCARCINOMA, LUNG, LEFT (HCC): ICD-10-CM

## 2023-09-18 ENCOUNTER — TELEPHONE (OUTPATIENT)
Dept: FAMILY MEDICINE CLINIC | Age: 83
End: 2023-09-18

## 2023-09-18 NOTE — TELEPHONE ENCOUNTER
Received fax from the [facility] requesting a pain patch for patient for c/o pain in shoulders.      Left message requesting patient to call the office to get more information on the medication request.

## 2023-09-18 NOTE — TELEPHONE ENCOUNTER
Got hit by the elevator door-closed on her.  just hit her a little bit. Took some tylenol is feeling better. Does NOT want an Rx for a pain patch at this time.

## 2023-09-22 ENCOUNTER — HOSPITAL ENCOUNTER (OUTPATIENT)
Dept: GENERAL RADIOLOGY | Age: 83
End: 2023-09-22
Payer: MEDICARE

## 2023-09-22 VITALS — WEIGHT: 174 LBS | BODY MASS INDEX: 30.83 KG/M2 | HEIGHT: 63 IN

## 2023-09-22 DIAGNOSIS — Z12.31 SCREENING MAMMOGRAM FOR BREAST CANCER: ICD-10-CM

## 2023-09-22 PROCEDURE — 77063 BREAST TOMOSYNTHESIS BI: CPT

## 2023-10-05 ENCOUNTER — TELEPHONE (OUTPATIENT)
Dept: PHARMACY | Facility: CLINIC | Age: 83
End: 2023-10-05

## 2023-10-05 NOTE — TELEPHONE ENCOUNTER
POPULATION HEALTH CLINICAL PHARMACY: ADHERENCE REVIEW  Identified care gap per [place]: fills at [facility]: Statin adherence      ASSESSMENT   [address] Records claims through 23 (Prior Year [address] = not reported; YTD [address] = 80%; Potential Fail Date: 10/15/23): Simvastatin 40mg last filled on 23 for 90 day supply. Next refill due: 23    Prescribed si tablet/capsule daily    Per Insurer Portal: same. 1RF    Per  Pharmacy: will get 90 day supply ready to  since past due. Lab Results   Component Value Date    CHOL 172 2023    TRIG 145 2023    HDL 54 2023    LDLCALC 89 2023     ALT   Date Value Ref Range Status   2023 39 (H) 0 - 32 U/L Final     AST   Date Value Ref Range Status   2023 28 0 - 31 U/L Final     Comment:     Specimen is moderately Hemolyzed. Result may be artificially increased. The ASCVD Risk score (Raghavendra DK, et al., 2019) failed to calculate for the following reasons: The 2019 ASCVD risk score is only valid for ages 36 to 78     The following are interventions that have been identified:   Patient overdue refilling simvastatin and active on home medication list.   Refill/s of Simvastatin 40mg  READY to  at patient's [facility]    Attempting to reach patient to review. Left message asking for return call. Prezihart message sent to patient. Last Visit: 23  Next Visit: 23      Venita Warner CPhT.    [facility] free: [phone]     For Pharmacy Admin Tracking Only    Program: Mari in place:  No  Recommendation Provided To: Pharmacy: 1  Intervention Detail: Refill(s) Provided  Intervention Accepted By: Pharmacy: 1  Gap Closed?: Yes   Time Spent (min): 15

## 2023-10-23 ENCOUNTER — TELEPHONE (OUTPATIENT)
Dept: FAMILY MEDICINE CLINIC | Age: 83
End: 2023-10-23

## 2023-10-23 ENCOUNTER — OFFICE VISIT (OUTPATIENT)
Dept: FAMILY MEDICINE CLINIC | Age: 83
End: 2023-10-23
Payer: MEDICARE

## 2023-10-23 VITALS
TEMPERATURE: 97.7 F | OXYGEN SATURATION: 94 % | SYSTOLIC BLOOD PRESSURE: 134 MMHG | DIASTOLIC BLOOD PRESSURE: 62 MMHG | RESPIRATION RATE: 18 BRPM | BODY MASS INDEX: 30.33 KG/M2 | HEART RATE: 90 BPM | WEIGHT: 171.2 LBS

## 2023-10-23 DIAGNOSIS — J44.1 COPD EXACERBATION (HCC): ICD-10-CM

## 2023-10-23 DIAGNOSIS — E11.65 UNCONTROLLED TYPE 2 DIABETES MELLITUS WITH HYPERGLYCEMIA (HCC): ICD-10-CM

## 2023-10-23 DIAGNOSIS — R05.9 COUGH, UNSPECIFIED TYPE: Primary | ICD-10-CM

## 2023-10-23 DIAGNOSIS — E03.9 ACQUIRED HYPOTHYROIDISM: ICD-10-CM

## 2023-10-23 LAB
INFLUENZA A ANTIGEN, POC: NEGATIVE
INFLUENZA B ANTIGEN, POC: NEGATIVE
Lab: NORMAL
PERFORMING INSTRUMENT: NORMAL
QC PASS/FAIL: NORMAL
SARS-COV-2, POC: NORMAL

## 2023-10-23 PROCEDURE — 1123F ACP DISCUSS/DSCN MKR DOCD: CPT | Performed by: FAMILY MEDICINE

## 2023-10-23 PROCEDURE — 3051F HG A1C>EQUAL 7.0%<8.0%: CPT | Performed by: FAMILY MEDICINE

## 2023-10-23 PROCEDURE — 87804 INFLUENZA ASSAY W/OPTIC: CPT | Performed by: FAMILY MEDICINE

## 2023-10-23 PROCEDURE — 87426 SARSCOV CORONAVIRUS AG IA: CPT | Performed by: FAMILY MEDICINE

## 2023-10-23 PROCEDURE — 99214 OFFICE O/P EST MOD 30 MIN: CPT | Performed by: FAMILY MEDICINE

## 2023-10-23 RX ORDER — PREDNISONE 20 MG/1
TABLET ORAL
Qty: 12 TABLET | Refills: 0 | Status: SHIPPED | OUTPATIENT
Start: 2023-10-23

## 2023-10-23 NOTE — PROGRESS NOTES
10/26/2023    Current Outpatient Medications   Medication Sig Dispense Refill    predniSONE (DELTASONE) 20 MG tablet 1 tab bid x  4 days, 1 tab daily x4 days 12 tablet 0    omeprazole (PRILOSEC) 20 MG delayed release capsule Take 2 capsules by mouth daily 90 capsule 1    levothyroxine (SYNTHROID) 125 MCG tablet Take 1 tablet by mouth daily 90 tablet 1    simvastatin (ZOCOR) 40 MG tablet Take 1 tablet by mouth nightly 90 tablet 1    albuterol sulfate HFA (PROAIR HFA) 108 (90 Base) MCG/ACT inhaler Inhale 2 puffs into the lungs every 6 hours as needed for Wheezing 1 each 6    fluticasone-umeclidin-vilant (TRELEGY ELLIPTA) 100-62.5-25 MCG/INH AEPB Inhale 1 puff into the lungs daily 3 each 3    dorzolamide (TRUSOPT) 2 % ophthalmic solution INSTILL 1 DROP INTO EACH EYE 3 TIMES A DAY      triamcinolone (NASACORT) 55 MCG/ACT nasal inhaler 2 sprays by Each Nostril route daily      latanoprost (XALATAN) 0.005 % ophthalmic solution Place 1 drop into the right eye nightly      Handicap Placard MISC by Does not apply route Expires  1 each 0    Blood Glucose Monitoring Suppl (ACCU-CHEK GUIDE ME) w/Device KIT 1 each by Does not apply route daily 1 kit 0    blood glucose test strips (ACCU-CHEK GUIDE) strip 1 each by In Vitro route daily As needed. 100 each 1    Accu-Chek Softclix Lancets MISC 1 each by Does not apply route 4 times daily 100 each 1     No current facility-administered medications for this visit.          Hayley Cox (: 1940 IS A 78 y.o. female ,Established patient, here for eval of Shortness of Breath (Started Saturday-went back on Nebulizer Saturday), Cough (Productive-little brown was clear and  started to turn darker), and Diarrhea (20 minutes after eating (every time))    Trouble breathing sat during the day  Did nebulizer 3 times a day  Treligy regular  Coughing people at dinner table    In the last week was to [facility], sat  went to daughter's  Now with increased roberts, cough

## 2023-10-25 ENCOUNTER — TELEPHONE (OUTPATIENT)
Dept: FAMILY MEDICINE CLINIC | Age: 83
End: 2023-10-25

## 2023-10-25 NOTE — TELEPHONE ENCOUNTER
Follow up on patient since starting steroid for breathing    Pt was seen in office on 10/23/23 and was given a steroid. Pt is doing well with no complaints. Her breathing has much improved since being on the steroid. Pt is up and walking around.

## 2023-10-26 ENCOUNTER — TELEPHONE (OUTPATIENT)
Dept: FAMILY MEDICINE CLINIC | Age: 83
End: 2023-10-26

## 2023-10-26 DIAGNOSIS — B37.0 THRUSH: Primary | ICD-10-CM

## 2023-10-26 RX ORDER — FLUCONAZOLE 100 MG/1
TABLET ORAL
Qty: 14 TABLET | Refills: 0 | Status: SHIPPED | OUTPATIENT
Start: 2023-10-26

## 2023-10-26 ASSESSMENT — ENCOUNTER SYMPTOMS
EYES NEGATIVE: 1
CHEST TIGHTNESS: 0
GASTROINTESTINAL NEGATIVE: 1
ALLERGIC/IMMUNOLOGIC NEGATIVE: 1
COUGH: 1
WHEEZING: 1
SHORTNESS OF BREATH: 1

## 2023-10-26 NOTE — TELEPHONE ENCOUNTER
Jennifer Cardenas at the [facility] called stating mylene is now experiencing thrush from the prednisone and is wanting something called in for the thrush. Pharmacy: Ginger Johnson at [facility] would like to be notified when this is called in so they can go pick it up.

## 2023-11-03 ENCOUNTER — TELEPHONE (OUTPATIENT)
Dept: FAMILY MEDICINE CLINIC | Age: 83
End: 2023-11-03

## 2023-11-03 DIAGNOSIS — J20.9 ACUTE BRONCHITIS DUE TO INFECTION: ICD-10-CM

## 2023-11-03 DIAGNOSIS — J44.1 ACUTE EXACERBATION OF CHRONIC OBSTRUCTIVE PULMONARY DISEASE (COPD) (HCC): Primary | ICD-10-CM

## 2023-11-03 RX ORDER — LEVOFLOXACIN 500 MG/1
500 TABLET, FILM COATED ORAL DAILY
Qty: 10 TABLET | Refills: 0 | Status: ON HOLD
Start: 2023-11-03 | End: 2023-11-07

## 2023-11-03 NOTE — TELEPHONE ENCOUNTER
Pt called, stated she finished Steroid a few days ago. She is c/o cough with a yellow - brown mucus and the SOB patient is still having.     Pt is requesting an antibiotic sent in to her pharmacy    Please advise

## 2023-11-06 ENCOUNTER — APPOINTMENT (OUTPATIENT)
Dept: CT IMAGING | Age: 83
DRG: 191 | End: 2023-11-06
Payer: MEDICARE

## 2023-11-06 ENCOUNTER — OFFICE VISIT (OUTPATIENT)
Dept: FAMILY MEDICINE CLINIC | Age: 83
End: 2023-11-06
Payer: MEDICARE

## 2023-11-06 ENCOUNTER — APPOINTMENT (OUTPATIENT)
Dept: GENERAL RADIOLOGY | Age: 83
DRG: 191 | End: 2023-11-06
Payer: MEDICARE

## 2023-11-06 ENCOUNTER — HOSPITAL ENCOUNTER (INPATIENT)
Age: 83
LOS: 3 days | Discharge: HOME | DRG: 191 | End: 2023-11-09
Attending: STUDENT IN AN ORGANIZED HEALTH CARE EDUCATION/TRAINING PROGRAM | Admitting: INTERNAL MEDICINE
Payer: MEDICARE

## 2023-11-06 VITALS
WEIGHT: 171.8 LBS | BODY MASS INDEX: 30.43 KG/M2 | DIASTOLIC BLOOD PRESSURE: 64 MMHG | RESPIRATION RATE: 40 BRPM | SYSTOLIC BLOOD PRESSURE: 128 MMHG | HEART RATE: 107 BPM | TEMPERATURE: 97.8 F

## 2023-11-06 DIAGNOSIS — R06.02 SOB (SHORTNESS OF BREATH): Primary | ICD-10-CM

## 2023-11-06 DIAGNOSIS — J44.1 COPD EXACERBATION (HCC): Primary | ICD-10-CM

## 2023-11-06 DIAGNOSIS — R06.00 DYSPNEA, UNSPECIFIED TYPE: ICD-10-CM

## 2023-11-06 DIAGNOSIS — J44.1 COPD EXACERBATION (HCC): ICD-10-CM

## 2023-11-06 LAB
ALBUMIN SERPL-MCNC: 4.1 G/DL (ref 3.5–5.2)
ALP SERPL-CCNC: 86 U/L (ref 35–104)
ALT SERPL-CCNC: 27 U/L (ref 0–32)
ANION GAP SERPL CALCULATED.3IONS-SCNC: 12 MMOL/L (ref 7–16)
AST SERPL-CCNC: 17 U/L (ref 0–31)
B PARAP IS1001 DNA NPH QL NAA+NON-PROBE: NOT DETECTED
B PERT DNA SPEC QL NAA+PROBE: NOT DETECTED
BASOPHILS # BLD: 0.08 K/UL (ref 0–0.2)
BASOPHILS NFR BLD: 1 % (ref 0–2)
BILIRUB SERPL-MCNC: 0.3 MG/DL (ref 0–1.2)
BNP SERPL-MCNC: 42 PG/ML (ref 0–450)
BUN SERPL-MCNC: 9 MG/DL (ref 6–23)
C PNEUM DNA NPH QL NAA+NON-PROBE: NOT DETECTED
CALCIUM SERPL-MCNC: 10.1 MG/DL (ref 8.6–10.2)
CHLORIDE SERPL-SCNC: 104 MMOL/L (ref 98–107)
CO2 SERPL-SCNC: 22 MMOL/L (ref 22–29)
CREAT SERPL-MCNC: 0.6 MG/DL (ref 0.5–1)
EOSINOPHIL # BLD: 0.6 K/UL (ref 0.05–0.5)
EOSINOPHILS RELATIVE PERCENT: 6 % (ref 0–6)
ERYTHROCYTE [DISTWIDTH] IN BLOOD BY AUTOMATED COUNT: 12.8 % (ref 11.5–15)
FLUAV RNA NPH QL NAA+NON-PROBE: NOT DETECTED
FLUBV RNA NPH QL NAA+NON-PROBE: NOT DETECTED
GFR SERPL CREATININE-BSD FRML MDRD: >60 ML/MIN/1.73M2
GLUCOSE SERPL-MCNC: 133 MG/DL (ref 74–99)
HADV DNA NPH QL NAA+NON-PROBE: NOT DETECTED
HCOV 229E RNA NPH QL NAA+NON-PROBE: NOT DETECTED
HCOV HKU1 RNA NPH QL NAA+NON-PROBE: NOT DETECTED
HCOV NL63 RNA NPH QL NAA+NON-PROBE: NOT DETECTED
HCOV OC43 RNA NPH QL NAA+NON-PROBE: NOT DETECTED
HCT VFR BLD AUTO: 44.6 % (ref 34–48)
HGB BLD-MCNC: 14.9 G/DL (ref 11.5–15.5)
HMPV RNA NPH QL NAA+NON-PROBE: NOT DETECTED
HPIV1 RNA NPH QL NAA+NON-PROBE: NOT DETECTED
HPIV2 RNA NPH QL NAA+NON-PROBE: NOT DETECTED
HPIV3 RNA NPH QL NAA+NON-PROBE: NOT DETECTED
HPIV4 RNA NPH QL NAA+NON-PROBE: NOT DETECTED
IMM GRANULOCYTES # BLD AUTO: 0.06 K/UL (ref 0–0.58)
IMM GRANULOCYTES NFR BLD: 1 % (ref 0–5)
LYMPHOCYTES NFR BLD: 1.94 K/UL (ref 1.5–4)
LYMPHOCYTES RELATIVE PERCENT: 20 % (ref 20–42)
M PNEUMO DNA NPH QL NAA+NON-PROBE: NOT DETECTED
MAGNESIUM SERPL-MCNC: 2.1 MG/DL (ref 1.6–2.6)
MCH RBC QN AUTO: 30.2 PG (ref 26–35)
MCHC RBC AUTO-ENTMCNC: 33.4 G/DL (ref 32–34.5)
MCV RBC AUTO: 90.3 FL (ref 80–99.9)
MONOCYTES NFR BLD: 0.87 K/UL (ref 0.1–0.95)
MONOCYTES NFR BLD: 9 % (ref 2–12)
NEUTROPHILS NFR BLD: 63 % (ref 43–80)
NEUTS SEG NFR BLD: 6.17 K/UL (ref 1.8–7.3)
PLATELET # BLD AUTO: 192 K/UL (ref 130–450)
PMV BLD AUTO: 11.5 FL (ref 7–12)
POTASSIUM SERPL-SCNC: 4.3 MMOL/L (ref 3.5–5)
PROT SERPL-MCNC: 7 G/DL (ref 6.4–8.3)
RBC # BLD AUTO: 4.94 M/UL (ref 3.5–5.5)
RSV RNA NPH QL NAA+NON-PROBE: NOT DETECTED
RV+EV RNA NPH QL NAA+NON-PROBE: NOT DETECTED
SARS-COV-2 RNA NPH QL NAA+NON-PROBE: NOT DETECTED
SODIUM SERPL-SCNC: 138 MMOL/L (ref 132–146)
SPECIMEN DESCRIPTION: NORMAL
TROPONIN I SERPL HS-MCNC: 7 NG/L (ref 0–9)
TROPONIN I SERPL HS-MCNC: 7 NG/L (ref 0–9)
WBC OTHER # BLD: 9.7 K/UL (ref 4.5–11.5)

## 2023-11-06 PROCEDURE — 6360000004 HC RX CONTRAST MEDICATION

## 2023-11-06 PROCEDURE — 99215 OFFICE O/P EST HI 40 MIN: CPT | Performed by: FAMILY MEDICINE

## 2023-11-06 PROCEDURE — 71045 X-RAY EXAM CHEST 1 VIEW: CPT

## 2023-11-06 PROCEDURE — 1123F ACP DISCUSS/DSCN MKR DOCD: CPT | Performed by: FAMILY MEDICINE

## 2023-11-06 PROCEDURE — 94640 AIRWAY INHALATION TREATMENT: CPT

## 2023-11-06 PROCEDURE — 85025 COMPLETE CBC W/AUTO DIFF WBC: CPT

## 2023-11-06 PROCEDURE — 94664 DEMO&/EVAL PT USE INHALER: CPT

## 2023-11-06 PROCEDURE — 2060000000 HC ICU INTERMEDIATE R&B

## 2023-11-06 PROCEDURE — 93000 ELECTROCARDIOGRAM COMPLETE: CPT | Performed by: FAMILY MEDICINE

## 2023-11-06 PROCEDURE — 99285 EMERGENCY DEPT VISIT HI MDM: CPT

## 2023-11-06 PROCEDURE — 71275 CT ANGIOGRAPHY CHEST: CPT

## 2023-11-06 PROCEDURE — 93005 ELECTROCARDIOGRAM TRACING: CPT

## 2023-11-06 PROCEDURE — 84484 ASSAY OF TROPONIN QUANT: CPT

## 2023-11-06 PROCEDURE — 83735 ASSAY OF MAGNESIUM: CPT

## 2023-11-06 PROCEDURE — 6370000000 HC RX 637 (ALT 250 FOR IP)

## 2023-11-06 PROCEDURE — 83880 ASSAY OF NATRIURETIC PEPTIDE: CPT

## 2023-11-06 PROCEDURE — 0202U NFCT DS 22 TRGT SARS-COV-2: CPT

## 2023-11-06 PROCEDURE — 80053 COMPREHEN METABOLIC PANEL: CPT

## 2023-11-06 PROCEDURE — APPSS60 APP SPLIT SHARED TIME 46-60 MINUTES: Performed by: NURSE PRACTITIONER

## 2023-11-06 RX ORDER — ACETAMINOPHEN 650 MG/1
650 SUPPOSITORY RECTAL EVERY 6 HOURS PRN
Status: DISCONTINUED | OUTPATIENT
Start: 2023-11-06 | End: 2023-11-09 | Stop reason: HOSPADM

## 2023-11-06 RX ORDER — ENOXAPARIN SODIUM 100 MG/ML
40 INJECTION SUBCUTANEOUS DAILY
Status: DISCONTINUED | OUTPATIENT
Start: 2023-11-07 | End: 2023-11-09 | Stop reason: HOSPADM

## 2023-11-06 RX ORDER — INSULIN LISPRO 100 [IU]/ML
0-4 INJECTION, SOLUTION INTRAVENOUS; SUBCUTANEOUS
Status: DISCONTINUED | OUTPATIENT
Start: 2023-11-07 | End: 2023-11-07

## 2023-11-06 RX ORDER — BENZONATATE 100 MG/1
100 CAPSULE ORAL 3 TIMES DAILY PRN
Status: DISCONTINUED | OUTPATIENT
Start: 2023-11-06 | End: 2023-11-07

## 2023-11-06 RX ORDER — ACETAMINOPHEN 325 MG/1
650 TABLET ORAL EVERY 6 HOURS PRN
Status: DISCONTINUED | OUTPATIENT
Start: 2023-11-06 | End: 2023-11-09 | Stop reason: HOSPADM

## 2023-11-06 RX ORDER — DEXTROSE MONOHYDRATE 100 MG/ML
INJECTION, SOLUTION INTRAVENOUS
Status: ON HOLD | COMMUNITY
Start: 2023-05-03 | End: 2023-11-07

## 2023-11-06 RX ORDER — ONDANSETRON 4 MG/1
4 TABLET, ORALLY DISINTEGRATING ORAL EVERY 8 HOURS PRN
Status: DISCONTINUED | OUTPATIENT
Start: 2023-11-06 | End: 2023-11-09 | Stop reason: HOSPADM

## 2023-11-06 RX ORDER — PANTOPRAZOLE SODIUM 40 MG/1
40 TABLET, DELAYED RELEASE ORAL
Status: DISCONTINUED | OUTPATIENT
Start: 2023-11-07 | End: 2023-11-09 | Stop reason: HOSPADM

## 2023-11-06 RX ORDER — ARFORMOTEROL TARTRATE 15 UG/2ML
15 SOLUTION RESPIRATORY (INHALATION)
Status: DISCONTINUED | OUTPATIENT
Start: 2023-11-07 | End: 2023-11-09 | Stop reason: HOSPADM

## 2023-11-06 RX ORDER — SODIUM CHLORIDE 0.9 % (FLUSH) 0.9 %
5-40 SYRINGE (ML) INJECTION EVERY 12 HOURS SCHEDULED
Status: DISCONTINUED | OUTPATIENT
Start: 2023-11-06 | End: 2023-11-09 | Stop reason: HOSPADM

## 2023-11-06 RX ORDER — POLYETHYLENE GLYCOL 3350 17 G/17G
17 POWDER, FOR SOLUTION ORAL DAILY PRN
Status: DISCONTINUED | OUTPATIENT
Start: 2023-11-06 | End: 2023-11-09 | Stop reason: HOSPADM

## 2023-11-06 RX ORDER — INSULIN LISPRO 100 [IU]/ML
0-4 INJECTION, SOLUTION INTRAVENOUS; SUBCUTANEOUS NIGHTLY
Status: DISCONTINUED | OUTPATIENT
Start: 2023-11-06 | End: 2023-11-07

## 2023-11-06 RX ORDER — PREDNISONE 20 MG/1
40 TABLET ORAL DAILY
Status: DISCONTINUED | OUTPATIENT
Start: 2023-11-09 | End: 2023-11-07

## 2023-11-06 RX ORDER — BUDESONIDE 0.25 MG/2ML
0.25 INHALANT ORAL
Status: DISCONTINUED | OUTPATIENT
Start: 2023-11-07 | End: 2023-11-09 | Stop reason: HOSPADM

## 2023-11-06 RX ORDER — LEVOFLOXACIN 500 MG/1
500 TABLET, FILM COATED ORAL DAILY
Status: DISCONTINUED | OUTPATIENT
Start: 2023-11-07 | End: 2023-11-09 | Stop reason: HOSPADM

## 2023-11-06 RX ORDER — ALBUTEROL SULFATE 2.5 MG/3ML
2.5 SOLUTION RESPIRATORY (INHALATION)
Status: DISCONTINUED | OUTPATIENT
Start: 2023-11-06 | End: 2023-11-07

## 2023-11-06 RX ORDER — ONDANSETRON 2 MG/ML
4 INJECTION INTRAMUSCULAR; INTRAVENOUS EVERY 6 HOURS PRN
Status: DISCONTINUED | OUTPATIENT
Start: 2023-11-06 | End: 2023-11-09 | Stop reason: HOSPADM

## 2023-11-06 RX ORDER — LEVOTHYROXINE SODIUM 0.12 MG/1
125 TABLET ORAL DAILY
Status: DISCONTINUED | OUTPATIENT
Start: 2023-11-07 | End: 2023-11-09 | Stop reason: HOSPADM

## 2023-11-06 RX ORDER — BUDESONIDE 0.5 MG/2ML
0.5 INHALANT ORAL
Status: ON HOLD | COMMUNITY
Start: 2023-05-01 | End: 2023-11-07

## 2023-11-06 RX ORDER — SODIUM CHLORIDE 0.9 % (FLUSH) 0.9 %
5-40 SYRINGE (ML) INJECTION PRN
Status: DISCONTINUED | OUTPATIENT
Start: 2023-11-06 | End: 2023-11-09 | Stop reason: HOSPADM

## 2023-11-06 RX ORDER — LATANOPROST 50 UG/ML
1 SOLUTION/ DROPS OPHTHALMIC NIGHTLY
Status: DISCONTINUED | OUTPATIENT
Start: 2023-11-07 | End: 2023-11-09 | Stop reason: HOSPADM

## 2023-11-06 RX ORDER — FLUTICASONE PROPIONATE 50 MCG
1 SPRAY, SUSPENSION (ML) NASAL DAILY
Status: DISCONTINUED | OUTPATIENT
Start: 2023-11-07 | End: 2023-11-09 | Stop reason: HOSPADM

## 2023-11-06 RX ORDER — IPRATROPIUM BROMIDE AND ALBUTEROL SULFATE 2.5; .5 MG/3ML; MG/3ML
1 SOLUTION RESPIRATORY (INHALATION)
Status: DISCONTINUED | OUTPATIENT
Start: 2023-11-07 | End: 2023-11-07

## 2023-11-06 RX ORDER — ARFORMOTEROL TARTRATE 15 UG/2ML
15 SOLUTION RESPIRATORY (INHALATION)
Status: ON HOLD | COMMUNITY
Start: 2023-05-01 | End: 2023-11-07

## 2023-11-06 RX ORDER — ATORVASTATIN CALCIUM 20 MG/1
20 TABLET, FILM COATED ORAL DAILY
Status: DISCONTINUED | OUTPATIENT
Start: 2023-11-07 | End: 2023-11-09 | Stop reason: HOSPADM

## 2023-11-06 RX ORDER — IPRATROPIUM BROMIDE AND ALBUTEROL SULFATE 2.5; .5 MG/3ML; MG/3ML
3 SOLUTION RESPIRATORY (INHALATION)
Status: COMPLETED | OUTPATIENT
Start: 2023-11-06 | End: 2023-11-06

## 2023-11-06 RX ORDER — GUAIFENESIN/DEXTROMETHORPHAN 100-10MG/5
5 SYRUP ORAL EVERY 4 HOURS PRN
Status: DISCONTINUED | OUTPATIENT
Start: 2023-11-06 | End: 2023-11-07

## 2023-11-06 RX ORDER — METHYLPREDNISOLONE SODIUM SUCCINATE 40 MG/ML
40 INJECTION, POWDER, LYOPHILIZED, FOR SOLUTION INTRAMUSCULAR; INTRAVENOUS EVERY 6 HOURS
Status: DISCONTINUED | OUTPATIENT
Start: 2023-11-07 | End: 2023-11-07

## 2023-11-06 RX ORDER — PREDNISONE 20 MG/1
40 TABLET ORAL DAILY
Status: DISCONTINUED | OUTPATIENT
Start: 2023-11-09 | End: 2023-11-06 | Stop reason: SDUPTHER

## 2023-11-06 RX ORDER — ATORVASTATIN CALCIUM 20 MG/1
20 TABLET, FILM COATED ORAL
COMMUNITY
Start: 2023-05-01

## 2023-11-06 RX ORDER — SODIUM CHLORIDE 9 MG/ML
INJECTION, SOLUTION INTRAVENOUS PRN
Status: DISCONTINUED | OUTPATIENT
Start: 2023-11-06 | End: 2023-11-09 | Stop reason: HOSPADM

## 2023-11-06 RX ADMIN — IOPAMIDOL 75 ML: 755 INJECTION, SOLUTION INTRAVENOUS at 21:24

## 2023-11-06 RX ADMIN — IPRATROPIUM BROMIDE AND ALBUTEROL SULFATE 3 DOSE: 2.5; .5 SOLUTION RESPIRATORY (INHALATION) at 19:03

## 2023-11-06 ASSESSMENT — PAIN SCALES - GENERAL
PAINLEVEL_OUTOF10: 0
PAINLEVEL_OUTOF10: 5

## 2023-11-06 ASSESSMENT — PAIN DESCRIPTION - LOCATION: LOCATION: CHEST

## 2023-11-06 ASSESSMENT — PAIN DESCRIPTION - DESCRIPTORS: DESCRIPTORS: TIGHTNESS

## 2023-11-06 ASSESSMENT — PAIN - FUNCTIONAL ASSESSMENT: PAIN_FUNCTIONAL_ASSESSMENT: 0-10

## 2023-11-06 NOTE — PROGRESS NOTES
11/11/2023    Current Outpatient Medications   Medication Sig Dispense Refill    atorvastatin (LIPITOR) 20 MG tablet Take 1 tablet by mouth (Patient not taking: Reported on 11/10/2023)      Handicap Placard MISC by Does not apply route 1 each 0    benzonatate (TESSALON) 100 MG capsule Take 1 capsule by mouth 3 times daily as needed for Cough 60 capsule 1    azithromycin (ZITHROMAX) 250 MG tablet Take 1 tablet by mouth See Admin Instructions for 5 days 500mg on day 1 followed by 250mg on days 2 - 5 6 tablet 0    predniSONE (DELTASONE) 10 MG tablet Take 4 tabs daily x 3 days then 3 tabs daily x 3 days then 2 tabs daily x 3 days then 1 tab daily x 3 days then stop 30 tablet 1    omeprazole (PRILOSEC) 40 MG delayed release capsule Take 1 capsule by mouth daily      SITagliptin (JANUVIA) 50 MG tablet Take 1 tablet by mouth daily 90 tablet 1    Handicap Placard MISC by Does not apply route Expires 9/24 1 each 0    Blood Glucose Monitoring Suppl (ACCU-CHEK GUIDE ME) w/Device KIT 1 each by Does not apply route daily 1 kit 0    blood glucose test strips (ACCU-CHEK GUIDE) strip 1 each by In Vitro route daily As needed. 100 each 1    Accu-Chek Softclix Lancets MISC 1 each by Does not apply route 4 times daily 100 each 1    levothyroxine (SYNTHROID) 125 MCG tablet Take 1 tablet by mouth daily 90 tablet 1    albuterol sulfate HFA (PROAIR HFA) 108 (90 Base) MCG/ACT inhaler Inhale 2 puffs into the lungs every 6 hours as needed for Wheezing 1 each 6    fluticasone-umeclidin-vilant (TRELEGY ELLIPTA) 100-62.5-25 MCG/INH AEPB Inhale 1 puff into the lungs daily 3 each 3    dorzolamide (TRUSOPT) 2 % ophthalmic solution INSTILL 1 DROP INTO EACH EYE 3 TIMES A DAY      triamcinolone (NASACORT) 55 MCG/ACT nasal inhaler 2 sprays by Each Nostril route daily      latanoprost (XALATAN) 0.005 % ophthalmic solution Place 1 drop into the right eye nightly       No current facility-administered medications for this visit.          Terrance Samayoa

## 2023-11-06 NOTE — ED NOTES
SPO2 92% on room air      [facility] Kera Patricio RN  11/06/23 [id number]

## 2023-11-07 LAB
ANION GAP SERPL CALCULATED.3IONS-SCNC: 12 MMOL/L (ref 7–16)
BASOPHILS # BLD: 0.07 K/UL (ref 0–0.2)
BASOPHILS NFR BLD: 1 % (ref 0–2)
BUN SERPL-MCNC: 10 MG/DL (ref 6–23)
CALCIUM SERPL-MCNC: 9.9 MG/DL (ref 8.6–10.2)
CHLORIDE SERPL-SCNC: 105 MMOL/L (ref 98–107)
CO2 SERPL-SCNC: 21 MMOL/L (ref 22–29)
CREAT SERPL-MCNC: 0.6 MG/DL (ref 0.5–1)
EKG ATRIAL RATE: 81 BPM
EKG P AXIS: 68 DEGREES
EKG P-R INTERVAL: 178 MS
EKG Q-T INTERVAL: 394 MS
EKG QRS DURATION: 132 MS
EKG QTC CALCULATION (BAZETT): 457 MS
EKG R AXIS: -79 DEGREES
EKG T AXIS: 52 DEGREES
EKG VENTRICULAR RATE: 81 BPM
EOSINOPHIL # BLD: 0.26 K/UL (ref 0.05–0.5)
EOSINOPHILS RELATIVE PERCENT: 3 % (ref 0–6)
ERYTHROCYTE [DISTWIDTH] IN BLOOD BY AUTOMATED COUNT: 12.7 % (ref 11.5–15)
GFR SERPL CREATININE-BSD FRML MDRD: >60 ML/MIN/1.73M2
GLUCOSE BLD-MCNC: 143 MG/DL (ref 74–99)
GLUCOSE BLD-MCNC: 213 MG/DL (ref 74–99)
GLUCOSE BLD-MCNC: 240 MG/DL (ref 74–99)
GLUCOSE BLD-MCNC: 373 MG/DL (ref 74–99)
GLUCOSE SERPL-MCNC: 293 MG/DL (ref 74–99)
HCT VFR BLD AUTO: 42.6 % (ref 34–48)
HGB BLD-MCNC: 14 G/DL (ref 11.5–15.5)
IMM GRANULOCYTES # BLD AUTO: 0.05 K/UL (ref 0–0.58)
IMM GRANULOCYTES NFR BLD: 1 % (ref 0–5)
LYMPHOCYTES NFR BLD: 0.56 K/UL (ref 1.5–4)
LYMPHOCYTES RELATIVE PERCENT: 6 % (ref 20–42)
MCH RBC QN AUTO: 29.9 PG (ref 26–35)
MCHC RBC AUTO-ENTMCNC: 32.9 G/DL (ref 32–34.5)
MCV RBC AUTO: 91 FL (ref 80–99.9)
MONOCYTES NFR BLD: 0.16 K/UL (ref 0.1–0.95)
MONOCYTES NFR BLD: 2 % (ref 2–12)
NEUTROPHILS NFR BLD: 88 % (ref 43–80)
NEUTS SEG NFR BLD: 7.92 K/UL (ref 1.8–7.3)
PLATELET # BLD AUTO: 170 K/UL (ref 130–450)
PMV BLD AUTO: 11.8 FL (ref 7–12)
POTASSIUM SERPL-SCNC: 4.2 MMOL/L (ref 3.5–5)
PROCALCITONIN SERPL-MCNC: 0.03 NG/ML (ref 0–0.08)
RBC # BLD AUTO: 4.68 M/UL (ref 3.5–5.5)
RBC # BLD: ABNORMAL 10*6/UL
RBC # BLD: ABNORMAL 10*6/UL
SODIUM SERPL-SCNC: 138 MMOL/L (ref 132–146)
WBC OTHER # BLD: 9 K/UL (ref 4.5–11.5)

## 2023-11-07 PROCEDURE — 93010 ELECTROCARDIOGRAM REPORT: CPT | Performed by: INTERNAL MEDICINE

## 2023-11-07 PROCEDURE — 84145 PROCALCITONIN (PCT): CPT

## 2023-11-07 PROCEDURE — 6360000002 HC RX W HCPCS: Performed by: NURSE PRACTITIONER

## 2023-11-07 PROCEDURE — 80048 BASIC METABOLIC PNL TOTAL CA: CPT

## 2023-11-07 PROCEDURE — 82962 GLUCOSE BLOOD TEST: CPT

## 2023-11-07 PROCEDURE — 2580000003 HC RX 258: Performed by: NURSE PRACTITIONER

## 2023-11-07 PROCEDURE — 85025 COMPLETE CBC W/AUTO DIFF WBC: CPT

## 2023-11-07 PROCEDURE — 99222 1ST HOSP IP/OBS MODERATE 55: CPT | Performed by: INTERNAL MEDICINE

## 2023-11-07 PROCEDURE — 6360000002 HC RX W HCPCS

## 2023-11-07 PROCEDURE — 6370000000 HC RX 637 (ALT 250 FOR IP): Performed by: NURSE PRACTITIONER

## 2023-11-07 PROCEDURE — 6370000000 HC RX 637 (ALT 250 FOR IP)

## 2023-11-07 PROCEDURE — 2060000000 HC ICU INTERMEDIATE R&B

## 2023-11-07 PROCEDURE — 94640 AIRWAY INHALATION TREATMENT: CPT

## 2023-11-07 RX ORDER — GUAIFENESIN 400 MG/1
400 TABLET ORAL 3 TIMES DAILY
Status: DISCONTINUED | OUTPATIENT
Start: 2023-11-07 | End: 2023-11-09 | Stop reason: HOSPADM

## 2023-11-07 RX ORDER — LEVOFLOXACIN 500 MG/1
500 TABLET, FILM COATED ORAL DAILY
Status: ON HOLD | COMMUNITY
End: 2023-11-09 | Stop reason: HOSPADM

## 2023-11-07 RX ORDER — IPRATROPIUM BROMIDE AND ALBUTEROL SULFATE 2.5; .5 MG/3ML; MG/3ML
1 SOLUTION RESPIRATORY (INHALATION)
Status: DISCONTINUED | OUTPATIENT
Start: 2023-11-07 | End: 2023-11-07

## 2023-11-07 RX ORDER — DEXTROSE MONOHYDRATE 100 MG/ML
INJECTION, SOLUTION INTRAVENOUS CONTINUOUS PRN
Status: DISCONTINUED | OUTPATIENT
Start: 2023-11-07 | End: 2023-11-09 | Stop reason: HOSPADM

## 2023-11-07 RX ORDER — PREDNISONE 20 MG/1
40 TABLET ORAL DAILY
Status: DISCONTINUED | OUTPATIENT
Start: 2023-11-07 | End: 2023-11-07

## 2023-11-07 RX ORDER — OMEPRAZOLE 40 MG/1
40 CAPSULE, DELAYED RELEASE ORAL DAILY
COMMUNITY

## 2023-11-07 RX ORDER — METHYLPREDNISOLONE SODIUM SUCCINATE 40 MG/ML
40 INJECTION, POWDER, LYOPHILIZED, FOR SOLUTION INTRAMUSCULAR; INTRAVENOUS EVERY 12 HOURS
Status: DISCONTINUED | OUTPATIENT
Start: 2023-11-07 | End: 2023-11-09

## 2023-11-07 RX ORDER — BENZONATATE 100 MG/1
200 CAPSULE ORAL 3 TIMES DAILY
Status: DISCONTINUED | OUTPATIENT
Start: 2023-11-07 | End: 2023-11-09 | Stop reason: HOSPADM

## 2023-11-07 RX ORDER — INSULIN LISPRO 100 [IU]/ML
0-8 INJECTION, SOLUTION INTRAVENOUS; SUBCUTANEOUS
Status: DISCONTINUED | OUTPATIENT
Start: 2023-11-07 | End: 2023-11-08

## 2023-11-07 RX ORDER — LEVALBUTEROL INHALATION SOLUTION 0.63 MG/3ML
0.63 SOLUTION RESPIRATORY (INHALATION) EVERY 8 HOURS PRN
Status: DISCONTINUED | OUTPATIENT
Start: 2023-11-07 | End: 2023-11-09 | Stop reason: HOSPADM

## 2023-11-07 RX ORDER — INSULIN LISPRO 100 [IU]/ML
0-4 INJECTION, SOLUTION INTRAVENOUS; SUBCUTANEOUS NIGHTLY
Status: DISCONTINUED | OUTPATIENT
Start: 2023-11-07 | End: 2023-11-08

## 2023-11-07 RX ORDER — LEVALBUTEROL INHALATION SOLUTION 0.63 MG/3ML
0.63 SOLUTION RESPIRATORY (INHALATION)
Status: DISCONTINUED | OUTPATIENT
Start: 2023-11-07 | End: 2023-11-09 | Stop reason: HOSPADM

## 2023-11-07 RX ADMIN — SODIUM CHLORIDE, PRESERVATIVE FREE 10 ML: 5 INJECTION INTRAVENOUS at 09:00

## 2023-11-07 RX ADMIN — INSULIN LISPRO 1 UNITS: 100 INJECTION, SOLUTION INTRAVENOUS; SUBCUTANEOUS at 16:44

## 2023-11-07 RX ADMIN — INSULIN LISPRO 4 UNITS: 100 INJECTION, SOLUTION INTRAVENOUS; SUBCUTANEOUS at 12:01

## 2023-11-07 RX ADMIN — BENZONATATE 200 MG: 100 CAPSULE ORAL at 15:16

## 2023-11-07 RX ADMIN — PREDNISONE 40 MG: 20 TABLET ORAL at 12:02

## 2023-11-07 RX ADMIN — BUDESONIDE 250 MCG: 0.25 SUSPENSION RESPIRATORY (INHALATION) at 19:42

## 2023-11-07 RX ADMIN — LEVALBUTEROL 0.63 MG: 0.63 SOLUTION RESPIRATORY (INHALATION) at 15:42

## 2023-11-07 RX ADMIN — GUAIFENESIN 400 MG: 400 TABLET ORAL at 15:16

## 2023-11-07 RX ADMIN — LEVOFLOXACIN 500 MG: 500 TABLET, FILM COATED ORAL at 08:59

## 2023-11-07 RX ADMIN — ATORVASTATIN CALCIUM 20 MG: 20 TABLET, FILM COATED ORAL at 20:54

## 2023-11-07 RX ADMIN — BUDESONIDE 250 MCG: 0.25 SUSPENSION RESPIRATORY (INHALATION) at 07:32

## 2023-11-07 RX ADMIN — SODIUM CHLORIDE, PRESERVATIVE FREE 10 ML: 5 INJECTION INTRAVENOUS at 00:58

## 2023-11-07 RX ADMIN — METHYLPREDNISOLONE SODIUM SUCCINATE 40 MG: 40 INJECTION INTRAMUSCULAR; INTRAVENOUS at 22:05

## 2023-11-07 RX ADMIN — METHYLPREDNISOLONE SODIUM SUCCINATE 40 MG: 40 INJECTION INTRAMUSCULAR; INTRAVENOUS at 00:57

## 2023-11-07 RX ADMIN — METHYLPREDNISOLONE SODIUM SUCCINATE 40 MG: 40 INJECTION INTRAMUSCULAR; INTRAVENOUS at 06:04

## 2023-11-07 RX ADMIN — LEVOTHYROXINE SODIUM 125 MCG: 0.12 TABLET ORAL at 05:41

## 2023-11-07 RX ADMIN — ARFORMOTEROL TARTRATE 15 MCG: 15 SOLUTION RESPIRATORY (INHALATION) at 19:42

## 2023-11-07 RX ADMIN — GUAIFENESIN 400 MG: 400 TABLET ORAL at 20:54

## 2023-11-07 RX ADMIN — FLUTICASONE PROPIONATE 1 SPRAY: 50 SPRAY, METERED NASAL at 20:55

## 2023-11-07 RX ADMIN — ARFORMOTEROL TARTRATE 15 MCG: 15 SOLUTION RESPIRATORY (INHALATION) at 07:32

## 2023-11-07 RX ADMIN — BENZONATATE 200 MG: 100 CAPSULE ORAL at 20:54

## 2023-11-07 RX ADMIN — IPRATROPIUM BROMIDE 0.5 MG: 0.5 SOLUTION RESPIRATORY (INHALATION) at 15:42

## 2023-11-07 RX ADMIN — LEVALBUTEROL 0.63 MG: 0.63 SOLUTION RESPIRATORY (INHALATION) at 19:42

## 2023-11-07 RX ADMIN — IPRATROPIUM BROMIDE 0.5 MG: 0.5 SOLUTION RESPIRATORY (INHALATION) at 19:42

## 2023-11-07 RX ADMIN — ENOXAPARIN SODIUM 40 MG: 100 INJECTION SUBCUTANEOUS at 08:59

## 2023-11-07 RX ADMIN — LATANOPROST 1 DROP: 50 SOLUTION OPHTHALMIC at 00:57

## 2023-11-07 RX ADMIN — IPRATROPIUM BROMIDE AND ALBUTEROL SULFATE 1 DOSE: 2.5; .5 SOLUTION RESPIRATORY (INHALATION) at 07:32

## 2023-11-07 RX ADMIN — SODIUM CHLORIDE, PRESERVATIVE FREE 10 ML: 5 INJECTION INTRAVENOUS at 20:58

## 2023-11-07 NOTE — PROGRESS NOTES
Pt seen and examined by night physician who admitted pt earlier today    Chart reviewed and updated by nursing

## 2023-11-07 NOTE — CARE COORDINATION
Social Work:    Reviewed chart notes. Mrs. Jin was admitted due to exacerbation of COPD. Social service met with Mrs. Jin, advised her about social service &  roles, as well as discussed discharge planning. Mrs. Fabio Riggs is a patient of Dr. Charla Park and uses [address] in [place]. She resides independently in [place]. and uses an NIV from [place]. Mrs. Jin has been in [facility] and [facility] snf in the past. She hopes to return to [place]. via their transportation or her daughter, however, if rehab is required, preference is [facility]. Mrs. Jin inquired about possible resource options available to assist with NIV monthly payment of $210.00 co-pay. Social work called [facility] and they are going to have their respiratory therapist re-evaluate eligibility with Mrs. Jin as she was not eligible in 2021, however, may now be. Social work to follow.     Electronically signed by STACI Orozco on 11/7/2023 at 1:09 PM

## 2023-11-07 NOTE — H&P
[facility] History and Physical      CHIEF COMPLAINT: Shortness of breath    History of Present Illness: This is an 78-year-old female with PMH significant for adenocarcinoma left lung, COPD, HLD, hypothyroidism, and type II DM. Patient to ED due to shortness of breath for the last 2 weeks. Patient seen by PCP today whom sent her to ED for additional evaluation and admission. Just finished prednisone taper. Currently on Levaquin and has taken 4 doses. Patient reports no improvement with increased coughing. Patient states that she is bringing up yellow and blood-tinged sputum. Associated symptoms include chest pain. Patient describes chest pain as achiness and is worsened with coughing and deep breath then. Denies fever, chills, abdominal pain, and changes in urination. Does report diarrhea. Labs remarkable for glucose 133. Respiratory panel negative. CTA done showing no evidence of acute PE,  Minimal lower lung field scarring with diffuse emphysematous changes noted within the lungs, no acute abnormality noted and possible mild chronic compression fracture deformities noted at a few levels within the thoracic spine and at L1 were benign hemangiomas also noted. Will admit for further evaluation and treatment.     Informant(s) for H&P: Patient and chart review    REVIEW OF SYSTEMS:  A comprehensive review of systems was negative except for: what is in the HPI      PMH:  Past Medical History:   Diagnosis Date    Adenocarcinoma, lung, left ([address]) 3/9/2018    Anesthesia     wakes up very slowly and has lasting drowsiness    Breast cancer ([address])     lt breast 2012    COPD exacerbation ([address]) 5/24/2023    COPD with acute exacerbation ([address]) 5/1/2023    COPD with exacerbation ([address]) 11/14/2015    General weakness 5/23/2023    Glaucoma     History of colon polyps     History of therapeutic radiation     Hx antineoplastic chemo     Hyperlipidemia     Hypothyroidism     Osteoarthritis

## 2023-11-07 NOTE — ACP (ADVANCE CARE PLANNING)
Advance Care Planning   Healthcare Decision Maker:    Primary Decision Maker: Lamont Merlos - Child - [phone]    Secondary Decision Maker: Roni Roberts - Child - [phone]    Click here to complete Healthcare Decision Makers including selection of the Healthcare Decision Maker Relationship (ie \"Primary\").

## 2023-11-07 NOTE — CONSULTS
Christiano Ren M.D.,[facility]  Chantell Meredith D.O., F.A.C.O.I., Aleena Foy M.D. Dearl KAREEM Garcia. Nahid Muller D.O. Patient:  Austin Marcos 78 y.o. female MRN: [id number]     Date of Service: 11/7/2023      PULMONARY CONSULTATION    Reason for Consultation: COPD exac. Referring Physician: LIDIA Reeves    Communication with the referring physician will be sent via the electronic medical record. Chief Complaint: shortness of breath    CODE STATUS: Full Code     SUBJECTIVE:  HPI:  Austin Marcos is a 78 y.o. female who presented to the hospital after experiencing shortness of breath x 2 weeks with blood-tinged sputum. She was being treated outpatient with a prednisone taper which was completed and levaquin which she took about 2 doses prior to coming in. Janay Sprague is a patient of Dr. Nahid Muller for COPD and adenocarcinoma of the lung. She was last seen in our office by the NP on 6/20 for a follow up from a hospitalization for RLL pneumonia. Her home regimen consists of Trelegy daily and albuterol prn. I do not appreciate any pneumonia or effusions on the chest imaging done. Molecular viral respiratory panel was negative, procalcitonin is unimpressive at 0.03.  she is afebrile and there is no leukocytosis. Janay Sprague was seen today sitting up in the chair on room air. She reports feeling a little better now. She reports only 2 bouts of hemoptysis after breathing treatments. She is bringing up mucous and having occasional right-sided chest pain associated with coughing. She denies any wheezing. She is most concerned with her shortness of breath with exertion and her elevated heart rate.       Past Medical History:   Diagnosis Date    Adenocarcinoma, lung, left ([address]) 3/9/2018    Anesthesia     wakes up very slowly and has lasting drowsiness    Breast cancer ([address])     lt breast 2012    COPD exacerbation ([address]) 5/24/2023    COPD with acute exacerbation ([address]) 5/1/2023    COPD not a significant change at this time. Imaging personally reviewed:  Pulmonary CTA 11/6/23: IMPRESSION:     1. No evidence of acute pulmonary emboli. 2.  Minimal lower lung field scarring with diffuse emphysematous changes  noted within the lungs. No acute pulmonary abnormality is noted. 3.  Degree of fatty infiltration liver but no focal liver lesions are noted. 4.  Possible mild chronic central superior endplate compression fracture  deformities noted at a few levels within the thoracic spine and at L1 where a  benign hemangioma is also noted. No definite acute bony abnormality is seen. 5.  Otherwise no acute pathology noted. CXR 11/6/23:  FINDINGS:  The lungs are without acute focal process. There is no effusion or  pneumothorax. The cardiomediastinal silhouette is without acute process. The  osseous structures are without acute process. IMPRESSION:  No acute process. Echo 8/22/2021:  Summary   Tachycardia noted. Normal left ventricular size. LV systolic function is hyperdynamic. Ejection fraction is visually estimated at 75-80%. There is near obliteration of the LV cavity during systole. There appears to be an intracavitary gradient approximately 20 mmHg. Indeterminate diastolic function. No regional wall motion abnormalities seen. Normal left ventricular wall thickness. Normal right ventricular size and function. No significant valvular abnormalities.     Labs:  Lab Results   Component Value Date/Time    WBC 9.0 11/07/2023 03:55 AM    RBC 4.68 11/07/2023 03:55 AM    HGB 14.0 11/07/2023 03:55 AM    HCT 42.6 11/07/2023 03:55 AM    MCV 91.0 11/07/2023 03:55 AM    MCH 29.9 11/07/2023 03:55 AM    MCHC 32.9 11/07/2023 03:55 AM    RDW 12.7 11/07/2023 03:55 AM     11/07/2023 03:55 AM    MPV 11.8 11/07/2023 03:55 AM     Lab Results   Component Value Date/Time     11/07/2023 03:55 AM    K 4.2 11/07/2023 03:55 AM    K 4.5 05/26/2023 04:15 AM

## 2023-11-07 NOTE — PROGRESS NOTES
4 Eyes Skin Assessment     NAME:  Sary Hernandez  YOB: 1940  MEDICAL RECORD NUMBER:  [id number]    The patient is being assessed for  Admission    I agree that at least one RN has performed a thorough Head to Toe Skin Assessment on the patient. ALL assessment sites listed below have been assessed. Areas assessed by both nurses:    Head, Face, Ears, Shoulders, Back, Chest, Arms, Elbows, Hands, Sacrum. Buttock, Coccyx, Ischium, and Legs. Feet and Heels        Does the Patient have a Wound?  No noted wound(s)       Sandoval Prevention initiated by RN: No  Wound Care Orders initiated by RN: No    Pressure Injury (Stage 3,4, Unstageable, DTI, NWPT, and Complex wounds) if present, place Wound referral order by RN under : No    New Ostomies, if present place, Ostomy referral order under : No     Nurse 1 eSignature: Electronically signed by Leighann De La Garza RN on 11/7/23 at 12:23 AM EST    **SHARE this note so that the co-signing nurse can place an eSignature**    Nurse 2 eSignature: Electronically signed by Zana Pederson RN on 11/7/23 at 12:24 AM EST

## 2023-11-08 LAB
ANION GAP SERPL CALCULATED.3IONS-SCNC: 11 MMOL/L (ref 7–16)
BASOPHILS # BLD: 0.01 K/UL (ref 0–0.2)
BASOPHILS NFR BLD: 0 % (ref 0–2)
BUN SERPL-MCNC: 14 MG/DL (ref 6–23)
CALCIUM SERPL-MCNC: 10.2 MG/DL (ref 8.6–10.2)
CHLORIDE SERPL-SCNC: 104 MMOL/L (ref 98–107)
CO2 SERPL-SCNC: 22 MMOL/L (ref 22–29)
CREAT SERPL-MCNC: 0.5 MG/DL (ref 0.5–1)
EOSINOPHIL # BLD: 0 K/UL (ref 0.05–0.5)
EOSINOPHILS RELATIVE PERCENT: 0 % (ref 0–6)
ERYTHROCYTE [DISTWIDTH] IN BLOOD BY AUTOMATED COUNT: 12.3 % (ref 11.5–15)
GFR SERPL CREATININE-BSD FRML MDRD: >60 ML/MIN/1.73M2
GLUCOSE BLD-MCNC: 215 MG/DL (ref 74–99)
GLUCOSE BLD-MCNC: 238 MG/DL (ref 74–99)
GLUCOSE BLD-MCNC: 295 MG/DL (ref 74–99)
GLUCOSE SERPL-MCNC: 307 MG/DL (ref 74–99)
HCT VFR BLD AUTO: 41.3 % (ref 34–48)
HGB BLD-MCNC: 14 G/DL (ref 11.5–15.5)
IMM GRANULOCYTES # BLD AUTO: 0.09 K/UL (ref 0–0.58)
IMM GRANULOCYTES NFR BLD: 1 % (ref 0–5)
LYMPHOCYTES NFR BLD: 0.78 K/UL (ref 1.5–4)
LYMPHOCYTES RELATIVE PERCENT: 9 % (ref 20–42)
MCH RBC QN AUTO: 30.3 PG (ref 26–35)
MCHC RBC AUTO-ENTMCNC: 33.9 G/DL (ref 32–34.5)
MCV RBC AUTO: 89.4 FL (ref 80–99.9)
MONOCYTES NFR BLD: 0.34 K/UL (ref 0.1–0.95)
MONOCYTES NFR BLD: 4 % (ref 2–12)
NEUTROPHILS NFR BLD: 85 % (ref 43–80)
NEUTS SEG NFR BLD: 7.06 K/UL (ref 1.8–7.3)
PLATELET # BLD AUTO: 182 K/UL (ref 130–450)
PMV BLD AUTO: 11.8 FL (ref 7–12)
POTASSIUM SERPL-SCNC: 4.2 MMOL/L (ref 3.5–5)
RBC # BLD AUTO: 4.62 M/UL (ref 3.5–5.5)
SODIUM SERPL-SCNC: 137 MMOL/L (ref 132–146)
WBC OTHER # BLD: 8.3 K/UL (ref 4.5–11.5)

## 2023-11-08 PROCEDURE — 2580000003 HC RX 258: Performed by: INTERNAL MEDICINE

## 2023-11-08 PROCEDURE — 6370000000 HC RX 637 (ALT 250 FOR IP): Performed by: NURSE PRACTITIONER

## 2023-11-08 PROCEDURE — 85025 COMPLETE CBC W/AUTO DIFF WBC: CPT

## 2023-11-08 PROCEDURE — 6360000002 HC RX W HCPCS: Performed by: NURSE PRACTITIONER

## 2023-11-08 PROCEDURE — 6370000000 HC RX 637 (ALT 250 FOR IP): Performed by: INTERNAL MEDICINE

## 2023-11-08 PROCEDURE — 94640 AIRWAY INHALATION TREATMENT: CPT

## 2023-11-08 PROCEDURE — 6370000000 HC RX 637 (ALT 250 FOR IP)

## 2023-11-08 PROCEDURE — 94660 CPAP INITIATION&MGMT: CPT

## 2023-11-08 PROCEDURE — 99232 SBSQ HOSP IP/OBS MODERATE 35: CPT | Performed by: INTERNAL MEDICINE

## 2023-11-08 PROCEDURE — 6360000002 HC RX W HCPCS

## 2023-11-08 PROCEDURE — 80048 BASIC METABOLIC PNL TOTAL CA: CPT

## 2023-11-08 PROCEDURE — 82962 GLUCOSE BLOOD TEST: CPT

## 2023-11-08 PROCEDURE — 2580000003 HC RX 258: Performed by: NURSE PRACTITIONER

## 2023-11-08 PROCEDURE — 2060000000 HC ICU INTERMEDIATE R&B

## 2023-11-08 RX ORDER — SODIUM CHLORIDE 9 MG/ML
INJECTION, SOLUTION INTRAVENOUS CONTINUOUS
Status: DISCONTINUED | OUTPATIENT
Start: 2023-11-08 | End: 2023-11-09 | Stop reason: HOSPADM

## 2023-11-08 RX ORDER — AZITHROMYCIN 250 MG/1
250 TABLET, FILM COATED ORAL SEE ADMIN INSTRUCTIONS
Qty: 6 TABLET | Refills: 0 | Status: SHIPPED | OUTPATIENT
Start: 2023-11-08 | End: 2023-11-13

## 2023-11-08 RX ORDER — INSULIN LISPRO 100 [IU]/ML
0-4 INJECTION, SOLUTION INTRAVENOUS; SUBCUTANEOUS NIGHTLY
Status: DISCONTINUED | OUTPATIENT
Start: 2023-11-08 | End: 2023-11-09

## 2023-11-08 RX ORDER — INSULIN LISPRO 100 [IU]/ML
0-8 INJECTION, SOLUTION INTRAVENOUS; SUBCUTANEOUS
Status: DISCONTINUED | OUTPATIENT
Start: 2023-11-08 | End: 2023-11-09

## 2023-11-08 RX ORDER — PREDNISONE 10 MG/1
TABLET ORAL
Qty: 30 TABLET | Refills: 1 | Status: SHIPPED | OUTPATIENT
Start: 2023-11-08

## 2023-11-08 RX ADMIN — BENZONATATE 200 MG: 100 CAPSULE ORAL at 09:40

## 2023-11-08 RX ADMIN — LEVOFLOXACIN 500 MG: 500 TABLET, FILM COATED ORAL at 09:40

## 2023-11-08 RX ADMIN — ATORVASTATIN CALCIUM 20 MG: 20 TABLET, FILM COATED ORAL at 20:29

## 2023-11-08 RX ADMIN — ENOXAPARIN SODIUM 40 MG: 100 INJECTION SUBCUTANEOUS at 09:41

## 2023-11-08 RX ADMIN — BENZONATATE 200 MG: 100 CAPSULE ORAL at 20:29

## 2023-11-08 RX ADMIN — LEVOTHYROXINE SODIUM 125 MCG: 0.12 TABLET ORAL at 06:37

## 2023-11-08 RX ADMIN — IPRATROPIUM BROMIDE 0.5 MG: 0.5 SOLUTION RESPIRATORY (INHALATION) at 15:39

## 2023-11-08 RX ADMIN — GUAIFENESIN 400 MG: 400 TABLET ORAL at 20:29

## 2023-11-08 RX ADMIN — SODIUM CHLORIDE, PRESERVATIVE FREE 10 ML: 5 INJECTION INTRAVENOUS at 11:02

## 2023-11-08 RX ADMIN — GUAIFENESIN 400 MG: 400 TABLET ORAL at 09:40

## 2023-11-08 RX ADMIN — ARFORMOTEROL TARTRATE 15 MCG: 15 SOLUTION RESPIRATORY (INHALATION) at 09:01

## 2023-11-08 RX ADMIN — PANTOPRAZOLE SODIUM 40 MG: 40 TABLET, DELAYED RELEASE ORAL at 06:37

## 2023-11-08 RX ADMIN — IPRATROPIUM BROMIDE 0.5 MG: 0.5 SOLUTION RESPIRATORY (INHALATION) at 11:55

## 2023-11-08 RX ADMIN — SODIUM CHLORIDE: 9 INJECTION, SOLUTION INTRAVENOUS at 20:45

## 2023-11-08 RX ADMIN — INSULIN LISPRO 2 UNITS: 100 INJECTION, SOLUTION INTRAVENOUS; SUBCUTANEOUS at 11:01

## 2023-11-08 RX ADMIN — IPRATROPIUM BROMIDE 0.5 MG: 0.5 SOLUTION RESPIRATORY (INHALATION) at 09:01

## 2023-11-08 RX ADMIN — BENZONATATE 200 MG: 100 CAPSULE ORAL at 13:51

## 2023-11-08 RX ADMIN — GUAIFENESIN 400 MG: 400 TABLET ORAL at 13:51

## 2023-11-08 RX ADMIN — FLUTICASONE PROPIONATE 1 SPRAY: 50 SPRAY, METERED NASAL at 20:30

## 2023-11-08 RX ADMIN — SODIUM CHLORIDE, PRESERVATIVE FREE 10 ML: 5 INJECTION INTRAVENOUS at 20:30

## 2023-11-08 RX ADMIN — INSULIN LISPRO 2 UNITS: 100 INJECTION, SOLUTION INTRAVENOUS; SUBCUTANEOUS at 15:50

## 2023-11-08 RX ADMIN — IPRATROPIUM BROMIDE 0.5 MG: 0.5 SOLUTION RESPIRATORY (INHALATION) at 19:28

## 2023-11-08 RX ADMIN — LEVALBUTEROL 0.63 MG: 0.63 SOLUTION RESPIRATORY (INHALATION) at 09:01

## 2023-11-08 RX ADMIN — INSULIN LISPRO 6 UNITS: 100 INJECTION, SOLUTION INTRAVENOUS; SUBCUTANEOUS at 06:37

## 2023-11-08 RX ADMIN — BUDESONIDE 250 MCG: 0.25 SUSPENSION RESPIRATORY (INHALATION) at 09:01

## 2023-11-08 RX ADMIN — BUDESONIDE 250 MCG: 0.25 SUSPENSION RESPIRATORY (INHALATION) at 19:28

## 2023-11-08 RX ADMIN — METHYLPREDNISOLONE SODIUM SUCCINATE 40 MG: 40 INJECTION INTRAMUSCULAR; INTRAVENOUS at 09:41

## 2023-11-08 RX ADMIN — METHYLPREDNISOLONE SODIUM SUCCINATE 40 MG: 40 INJECTION INTRAMUSCULAR; INTRAVENOUS at 20:32

## 2023-11-08 RX ADMIN — LEVALBUTEROL 0.63 MG: 0.63 SOLUTION RESPIRATORY (INHALATION) at 15:41

## 2023-11-08 RX ADMIN — ARFORMOTEROL TARTRATE 15 MCG: 15 SOLUTION RESPIRATORY (INHALATION) at 19:28

## 2023-11-08 RX ADMIN — LEVALBUTEROL 0.63 MG: 0.63 SOLUTION RESPIRATORY (INHALATION) at 12:01

## 2023-11-08 RX ADMIN — LATANOPROST 1 DROP: 50 SOLUTION OPHTHALMIC at 20:31

## 2023-11-08 RX ADMIN — LEVALBUTEROL 0.63 MG: 0.63 SOLUTION RESPIRATORY (INHALATION) at 19:28

## 2023-11-08 NOTE — CARE COORDINATION
Social Work:    Edna at [employer]. requested therapy to confirm return to [facility]. vs snf. Social work prompted for therapy evaluations.     Electronically signed by STACI Ramos on 11/8/2023 at 1:08 PM

## 2023-11-09 VITALS
WEIGHT: 170.6 LBS | SYSTOLIC BLOOD PRESSURE: 158 MMHG | RESPIRATION RATE: 18 BRPM | BODY MASS INDEX: 30.23 KG/M2 | HEART RATE: 95 BPM | TEMPERATURE: 97.9 F | DIASTOLIC BLOOD PRESSURE: 80 MMHG | HEIGHT: 63 IN | OXYGEN SATURATION: 98 %

## 2023-11-09 LAB
ANION GAP SERPL CALCULATED.3IONS-SCNC: 10 MMOL/L (ref 7–16)
BACTERIA URNS QL MICRO: ABNORMAL
BASOPHILS # BLD: 0.02 K/UL (ref 0–0.2)
BASOPHILS NFR BLD: 0 % (ref 0–2)
BILIRUB UR QL STRIP: NEGATIVE
BUN SERPL-MCNC: 16 MG/DL (ref 6–23)
CALCIUM SERPL-MCNC: 10 MG/DL (ref 8.6–10.2)
CHLORIDE SERPL-SCNC: 104 MMOL/L (ref 98–107)
CLARITY UR: CLEAR
CO2 SERPL-SCNC: 23 MMOL/L (ref 22–29)
COLOR UR: YELLOW
CREAT SERPL-MCNC: 0.5 MG/DL (ref 0.5–1)
EOSINOPHIL # BLD: 0.03 K/UL (ref 0.05–0.5)
EOSINOPHILS RELATIVE PERCENT: 0 % (ref 0–6)
EPI CELLS #/AREA URNS HPF: ABNORMAL /HPF
ERYTHROCYTE [DISTWIDTH] IN BLOOD BY AUTOMATED COUNT: 12.4 % (ref 11.5–15)
GFR SERPL CREATININE-BSD FRML MDRD: >60 ML/MIN/1.73M2
GLUCOSE BLD-MCNC: 203 MG/DL (ref 74–99)
GLUCOSE BLD-MCNC: 266 MG/DL (ref 74–99)
GLUCOSE BLD-MCNC: 269 MG/DL (ref 74–99)
GLUCOSE BLD-MCNC: 280 MG/DL (ref 74–99)
GLUCOSE SERPL-MCNC: 329 MG/DL (ref 74–99)
GLUCOSE UR STRIP-MCNC: >=1000 MG/DL
HCT VFR BLD AUTO: 40.2 % (ref 34–48)
HGB BLD-MCNC: 13.3 G/DL (ref 11.5–15.5)
HGB UR QL STRIP.AUTO: NEGATIVE
IMM GRANULOCYTES # BLD AUTO: 0.06 K/UL (ref 0–0.58)
IMM GRANULOCYTES NFR BLD: 1 % (ref 0–5)
KETONES UR STRIP-MCNC: NEGATIVE MG/DL
LEUKOCYTE ESTERASE UR QL STRIP: ABNORMAL
LYMPHOCYTES NFR BLD: 0.53 K/UL (ref 1.5–4)
LYMPHOCYTES RELATIVE PERCENT: 6 % (ref 20–42)
MCH RBC QN AUTO: 29.9 PG (ref 26–35)
MCHC RBC AUTO-ENTMCNC: 33.1 G/DL (ref 32–34.5)
MCV RBC AUTO: 90.3 FL (ref 80–99.9)
MONOCYTES NFR BLD: 0.27 K/UL (ref 0.1–0.95)
MONOCYTES NFR BLD: 3 % (ref 2–12)
NEUTROPHILS NFR BLD: 89 % (ref 43–80)
NEUTS SEG NFR BLD: 7.35 K/UL (ref 1.8–7.3)
NITRITE UR QL STRIP: NEGATIVE
PH UR STRIP: 6 [PH] (ref 5–9)
PLATELET # BLD AUTO: 163 K/UL (ref 130–450)
PMV BLD AUTO: 12.3 FL (ref 7–12)
POTASSIUM SERPL-SCNC: 4.3 MMOL/L (ref 3.5–5)
PROT UR STRIP-MCNC: NEGATIVE MG/DL
RBC # BLD AUTO: 4.45 M/UL (ref 3.5–5.5)
RBC # BLD: NORMAL 10*6/UL
RBC #/AREA URNS HPF: ABNORMAL /HPF
SODIUM SERPL-SCNC: 137 MMOL/L (ref 132–146)
SP GR UR STRIP: <1.005 (ref 1–1.03)
UROBILINOGEN UR STRIP-ACNC: 0.2 EU/DL (ref 0–1)
WBC #/AREA URNS HPF: ABNORMAL /HPF
WBC OTHER # BLD: 8.3 K/UL (ref 4.5–11.5)

## 2023-11-09 PROCEDURE — 6360000002 HC RX W HCPCS: Performed by: NURSE PRACTITIONER

## 2023-11-09 PROCEDURE — 81001 URINALYSIS AUTO W/SCOPE: CPT

## 2023-11-09 PROCEDURE — 94660 CPAP INITIATION&MGMT: CPT

## 2023-11-09 PROCEDURE — 80048 BASIC METABOLIC PNL TOTAL CA: CPT

## 2023-11-09 PROCEDURE — 6370000000 HC RX 637 (ALT 250 FOR IP): Performed by: INTERNAL MEDICINE

## 2023-11-09 PROCEDURE — 94640 AIRWAY INHALATION TREATMENT: CPT

## 2023-11-09 PROCEDURE — 6360000002 HC RX W HCPCS

## 2023-11-09 PROCEDURE — 85025 COMPLETE CBC W/AUTO DIFF WBC: CPT

## 2023-11-09 PROCEDURE — 6370000000 HC RX 637 (ALT 250 FOR IP)

## 2023-11-09 PROCEDURE — 6370000000 HC RX 637 (ALT 250 FOR IP): Performed by: NURSE PRACTITIONER

## 2023-11-09 PROCEDURE — 99239 HOSP IP/OBS DSCHRG MGMT >30: CPT | Performed by: STUDENT IN AN ORGANIZED HEALTH CARE EDUCATION/TRAINING PROGRAM

## 2023-11-09 PROCEDURE — 82962 GLUCOSE BLOOD TEST: CPT

## 2023-11-09 PROCEDURE — 6370000000 HC RX 637 (ALT 250 FOR IP): Performed by: STUDENT IN AN ORGANIZED HEALTH CARE EDUCATION/TRAINING PROGRAM

## 2023-11-09 RX ORDER — INSULIN LISPRO 100 [IU]/ML
0-4 INJECTION, SOLUTION INTRAVENOUS; SUBCUTANEOUS NIGHTLY
Status: DISCONTINUED | OUTPATIENT
Start: 2023-11-09 | End: 2023-11-09 | Stop reason: HOSPADM

## 2023-11-09 RX ORDER — PREDNISONE 20 MG/1
40 TABLET ORAL DAILY
Status: DISCONTINUED | OUTPATIENT
Start: 2023-11-09 | End: 2023-11-09 | Stop reason: HOSPADM

## 2023-11-09 RX ORDER — INSULIN LISPRO 100 [IU]/ML
0-16 INJECTION, SOLUTION INTRAVENOUS; SUBCUTANEOUS
Status: DISCONTINUED | OUTPATIENT
Start: 2023-11-09 | End: 2023-11-09 | Stop reason: HOSPADM

## 2023-11-09 RX ADMIN — BENZONATATE 200 MG: 100 CAPSULE ORAL at 13:44

## 2023-11-09 RX ADMIN — GUAIFENESIN 400 MG: 400 TABLET ORAL at 13:44

## 2023-11-09 RX ADMIN — IPRATROPIUM BROMIDE 0.5 MG: 0.5 SOLUTION RESPIRATORY (INHALATION) at 07:57

## 2023-11-09 RX ADMIN — INSULIN LISPRO 8 UNITS: 100 INJECTION, SOLUTION INTRAVENOUS; SUBCUTANEOUS at 15:59

## 2023-11-09 RX ADMIN — IPRATROPIUM BROMIDE 0.5 MG: 0.5 SOLUTION RESPIRATORY (INHALATION) at 15:39

## 2023-11-09 RX ADMIN — IPRATROPIUM BROMIDE 0.5 MG: 0.5 SOLUTION RESPIRATORY (INHALATION) at 12:16

## 2023-11-09 RX ADMIN — LEVOFLOXACIN 500 MG: 500 TABLET, FILM COATED ORAL at 08:34

## 2023-11-09 RX ADMIN — PANTOPRAZOLE SODIUM 40 MG: 40 TABLET, DELAYED RELEASE ORAL at 06:42

## 2023-11-09 RX ADMIN — ENOXAPARIN SODIUM 40 MG: 100 INJECTION SUBCUTANEOUS at 08:35

## 2023-11-09 RX ADMIN — LEVALBUTEROL 0.63 MG: 0.63 SOLUTION RESPIRATORY (INHALATION) at 07:57

## 2023-11-09 RX ADMIN — BUDESONIDE 250 MCG: 0.25 SUSPENSION RESPIRATORY (INHALATION) at 07:56

## 2023-11-09 RX ADMIN — PREDNISONE 40 MG: 20 TABLET ORAL at 08:34

## 2023-11-09 RX ADMIN — GUAIFENESIN 400 MG: 400 TABLET ORAL at 08:34

## 2023-11-09 RX ADMIN — LEVALBUTEROL 0.63 MG: 0.63 SOLUTION RESPIRATORY (INHALATION) at 15:39

## 2023-11-09 RX ADMIN — INSULIN LISPRO 4 UNITS: 100 INJECTION, SOLUTION INTRAVENOUS; SUBCUTANEOUS at 11:06

## 2023-11-09 RX ADMIN — INSULIN LISPRO 8 UNITS: 100 INJECTION, SOLUTION INTRAVENOUS; SUBCUTANEOUS at 08:46

## 2023-11-09 RX ADMIN — ARFORMOTEROL TARTRATE 15 MCG: 15 SOLUTION RESPIRATORY (INHALATION) at 07:56

## 2023-11-09 RX ADMIN — LEVALBUTEROL 0.63 MG: 0.63 SOLUTION RESPIRATORY (INHALATION) at 12:15

## 2023-11-09 RX ADMIN — BENZONATATE 200 MG: 100 CAPSULE ORAL at 08:34

## 2023-11-09 RX ADMIN — LEVOTHYROXINE SODIUM 125 MCG: 0.12 TABLET ORAL at 06:42

## 2023-11-09 RX ADMIN — INSULIN LISPRO 4 UNITS: 100 INJECTION, SOLUTION INTRAVENOUS; SUBCUTANEOUS at 06:43

## 2023-11-09 ASSESSMENT — PAIN SCALES - GENERAL
PAINLEVEL_OUTOF10: 0
PAINLEVEL_OUTOF10: 0

## 2023-11-09 NOTE — CARE COORDINATION
Social Work:    Ambulating independently per RN. Plan return to [facility] when stable. Will transfer back via facility ambulette, [facility] ambulette, or family, depending on time of day. Social work updated Brynn at [employer] [facility]. about possible discharge today vs tomorrow.     Electronically signed by STACI Degroot on 11/9/2023 at 1:05 PM

## 2023-11-09 NOTE — DISCHARGE SUMMARY
[facility] Physician Discharge Summary       No follow-up provider specified. Activity level: As tolerated    Dispo: Home      Condition on discharge: Stable    Patient ID:  Deacon Thompson  [id number]  89 y.o.  1940    Admit date: 11/6/2023    Discharge date and time:  11/9/2023  2:22 PM    Admission Diagnoses: Principal Problem:    COPD exacerbation ([address])  Active Problems:    Hypothyroidism    History of primary malignant neoplasm of left lung  Resolved Problems:    * No resolved hospital problems. *      Discharge Diagnoses: Principal Problem:    COPD exacerbation ([address])  Active Problems:    Hypothyroidism    History of primary malignant neoplasm of left lung  Resolved Problems:    * No resolved hospital problems. *      Consults:  IP CONSULT TO PULMONOLOGY  IP CONSULT TO IV TEAM    Procedures: none    Hospital Course:   Patient Deacon Thompson is a 78 y.o. presented with COPD exacerbation ([address]) [J44.1]  Brief summary:  Patient presented with acute COPDe which improved following steroids and nebs. Seen by Pulm, given script for azithromycin as outpt. Did note some tachycardia however she notes this began when she started taking steroids prior to admission, stable ~100-105 and will benefit from outpt f/u once no longer requiring increased beta agonists and steroids for COPDe. She is to discharge home with steroid taper and is in stable condition. **Most recent hospitalist plan**  Copd exacerbation  continue nebs. Continue taper of steroids  Dm type 2 uncontrolled monitor bs and tx with insulin  Hyperlipidemia continue med  Hypothyroidism continue med  Tachycardia pt possibly could be having osmotic diuresis from hyperglycemia.  Will give trial of fluids at reduced rate and monitor  **Most recent hospitalist plan**    Discharge Exam:  General Appearance: alert and oriented to person, place and time and in NAD, sitting comfortably in bed  Skin: warm and dry  Head: normocephalic and

## 2023-11-10 ENCOUNTER — CARE COORDINATION (OUTPATIENT)
Dept: CARE COORDINATION | Age: 83
End: 2023-11-10

## 2023-11-10 ENCOUNTER — TELEPHONE (OUTPATIENT)
Dept: FAMILY MEDICINE CLINIC | Age: 83
End: 2023-11-10

## 2023-11-10 DIAGNOSIS — J44.1 COPD EXACERBATION (HCC): Primary | ICD-10-CM

## 2023-11-10 PROCEDURE — 1111F DSCHRG MED/CURRENT MED MERGE: CPT | Performed by: FAMILY MEDICINE

## 2023-11-10 NOTE — TELEPHONE ENCOUNTER
Medication Refill Request    LOV 11/6/2023  NOV 11/13/2023    Lab Results   Component Value Date    CREATININE 0.5 11/09/2023

## 2023-11-10 NOTE — CARE COORDINATION
Care Transitions Initial Follow Up Call    Call within 2 business days of discharge: Yes    Patient Current Location:  Home: [address] [place], [place] [id number]    Care Transition Nurse contacted the patient and Tang Belcher (nurse) at [facility]  by telephone to perform post hospital discharge assessment. Verified name and  with patient as identifiers. Provided introduction to self, and explanation of the Care Transition Nurse role. Patient: Alma Chino Patient : 1940   MRN: [id number]  Reason for Admission: COPD exacerbation  Discharge Date: 23 RARS: Readmission Risk Score: 11.3      Last Discharge Facility       Date Complaint Diagnosis Description Type Department Provider    23 Shortness of Breath COPD exacerbation ([address] ) . .. ED to Hosp-Admission (Discharged) (ADMITTED) TOMAS Negrete MD; David Bermudez, ... Was this an external facility discharge? No Discharge Facility: [address] [place] [address]    Challenges to be reviewed by the provider   Additional needs identified to be addressed with provider: No  none               Method of communication with provider: none. Spoke with patient today 11/10/23 for initial SCAR/hospital discharge follow up. Confirmed patient resides at [facility]. Patient states she is feeling better since discharge but continues with ongoing productive cough. States she is producing clear phlegm. Noted CTA obtained on admission showed the following below:     IMPRESSION:     1. No evidence of acute pulmonary emboli. 2.  Minimal lower lung field scarring with diffuse emphysematous changes  noted within the lungs. No acute pulmonary abnormality is noted. 3.  Degree of fatty infiltration liver but no focal liver lesions are noted. 4.  Possible mild chronic central superior endplate compression fracture  deformities noted at a few levels within the thoracic spine and at L1 where a  benign hemangioma is also noted.   No definite acute bony abnormality

## 2023-11-10 NOTE — TELEPHONE ENCOUNTER
Care Transitions Initial Follow Up Call    Outreach made within 2 business days of discharge: Yes    Patient: Sary Hernandez Patient : 1940   MRN: [id number]  Reason for Admission: COPD  Discharge Date: 23       Spoke with: Sary Hernandze      Discharge department/facility: [facility] Interactive Patient Contact:  Was patient able to fill all prescriptions: Yes  Was patient instructed to bring all medications to the follow-up visit: Yes  Is patient taking all medications as directed in the discharge summary? Yes  Does patient understand their discharge instructions: Yes  Does patient have questions or concerns that need addressed prior to 7-14 day follow up office visit: no    Scheduled appointment with PCP within 7-14 days    Patient was admitted for COPD. Pulmonology was consulted and steroid and nebulizers were ordered. She was discharged with prednisone taper and zithromax which she was able to get and is taking. She states she is feeling better then she was but still complaints of a productive cough. Per her request I did call the inn at [facility] to schedule a hospital follow up, they will be able to transport.      Follow Up  Future Appointments   Date Time Provider [address] [place] [address]   2023 11:30 AM Jessica Dave [facility]   2023  2:30 PM Rachana Fernandez MD [facility]   2023 10:40 AM Debra Vargas MD [facility]       Sebastián Avalos RN

## 2023-11-11 RX ORDER — BENZONATATE 100 MG/1
100 CAPSULE ORAL 3 TIMES DAILY PRN
Qty: 60 CAPSULE | Refills: 1 | Status: SHIPPED | OUTPATIENT
Start: 2023-11-11 | End: 2023-12-21

## 2023-11-11 ASSESSMENT — ENCOUNTER SYMPTOMS
GASTROINTESTINAL NEGATIVE: 1
WHEEZING: 0
CHEST TIGHTNESS: 0
ALLERGIC/IMMUNOLOGIC NEGATIVE: 1
COUGH: 1
EYES NEGATIVE: 1
SHORTNESS OF BREATH: 1

## 2023-11-13 ENCOUNTER — OFFICE VISIT (OUTPATIENT)
Dept: FAMILY MEDICINE CLINIC | Age: 83
End: 2023-11-13

## 2023-11-13 VITALS
BODY MASS INDEX: 30.12 KG/M2 | HEIGHT: 63 IN | TEMPERATURE: 97.4 F | WEIGHT: 170 LBS | HEART RATE: 100 BPM | OXYGEN SATURATION: 99 % | SYSTOLIC BLOOD PRESSURE: 128 MMHG | DIASTOLIC BLOOD PRESSURE: 60 MMHG | RESPIRATION RATE: 18 BRPM

## 2023-11-13 DIAGNOSIS — M79.605 PAIN OF LEFT LOWER EXTREMITY: ICD-10-CM

## 2023-11-13 DIAGNOSIS — Z09 HOSPITAL DISCHARGE FOLLOW-UP: Primary | ICD-10-CM

## 2023-11-13 DIAGNOSIS — E78.2 MIXED HYPERLIPIDEMIA: ICD-10-CM

## 2023-11-13 DIAGNOSIS — J44.1 COPD EXACERBATION (HCC): ICD-10-CM

## 2023-11-13 RX ORDER — SIMVASTATIN 40 MG
40 TABLET ORAL NIGHTLY
Qty: 90 TABLET | Refills: 1 | Status: SHIPPED | OUTPATIENT
Start: 2023-11-13

## 2023-11-13 NOTE — PROGRESS NOTES
Post-Discharge Transitional Care  Follow Up    Pratima Epperson   YOB: 1940    Date of Office Visit:  11/13/2023  Date of Hospital Admission: 11/6/23  Date of Hospital Discharge: 11/9/23  Risk of hospital readmission (high >=14%. Medium >=10%) :Readmission Risk Score: 11.3    Care management risk score Rising risk (score 2-5) and Complex Care (Scores >=6): No Risk Score On File     Non face to face  following discharge, date last encounter closed (first attempt may have been earlier): 11/10/2023    Call initiated 2 business days of discharge: Yes    ASSESSMENT/PLAN:   Hospital discharge follow-up  -     NH DISCHARGE MEDS RECONCILED W/ CURRENT OUTPATIENT MED LIST  COPD exacerbation ([address])        -     Improving; continue with steroids  Pain of left lower extremity  -     Vascular duplex lower extremity venous left; Future  -     R/o DVT given recent hospitalization    Medical Decision Making: high complexity    Keep scheduled appointment       Subjective:     Inpatient course: Discharge summary reviewed- see chart. Interval history/Current status: Patient was admitted for COPD. Pulmonology was consulted; steroid and nebulizers were ordered. She was discharged with extra prednisone taper and zithromax to have on hand at home and has been taking prednisone taper as prescribed. She states she is feeling better then she was. Cough is improving but still present. +fatigue. Reports left leg pain. Attributes this to taking steroids. Denies redness, swelling, or warmth.      Patient Active Problem List   Diagnosis    History of colon polyps    Family history of colon cancer    Personal history of breast cancer    Hypothyroidism    Hyperlipidemia    Nodule of left lung    Raynaud's phenomenon without gangrene    Venous insufficiency of both lower extremities    Other emphysema ([address])    Uncontrolled type 2 diabetes mellitus with hyperglycemia ([address])    History of primary malignant neoplasm of left lung    COPD

## 2023-11-14 DIAGNOSIS — M79.605 PAIN OF LEFT LOWER EXTREMITY: ICD-10-CM

## 2023-11-20 RX ORDER — OMEPRAZOLE 20 MG/1
40 CAPSULE, DELAYED RELEASE ORAL DAILY
Qty: 60 CAPSULE | Refills: 0 | Status: SHIPPED | OUTPATIENT
Start: 2023-11-20

## 2023-11-20 NOTE — TELEPHONE ENCOUNTER
Medication Refill Request    LOV 11/13/2023  NOV 11/27/2023    Lab Results   Component Value Date    CREATININE 0.5 11/09/2023

## 2023-11-21 LAB
ALBUMIN SERPL-MCNC: NORMAL G/DL
ALP BLD-CCNC: NORMAL U/L
ALT SERPL-CCNC: NORMAL U/L
ANION GAP SERPL CALCULATED.3IONS-SCNC: NORMAL MMOL/L
AST SERPL-CCNC: NORMAL U/L
BASOPHILS ABSOLUTE: NORMAL
BASOPHILS RELATIVE PERCENT: NORMAL
BILIRUB SERPL-MCNC: NORMAL MG/DL
BUN BLDV-MCNC: NORMAL MG/DL
CALCIUM SERPL-MCNC: NORMAL MG/DL
CHLORIDE BLD-SCNC: NORMAL MMOL/L
CHOLESTEROL, TOTAL: 174 MG/DL
CHOLESTEROL/HDL RATIO: 2.9
CO2: NORMAL
CREAT SERPL-MCNC: NORMAL MG/DL
EGFR: NORMAL
EOSINOPHILS ABSOLUTE: NORMAL
EOSINOPHILS RELATIVE PERCENT: NORMAL
GLUCOSE BLD-MCNC: NORMAL MG/DL
HCT VFR BLD CALC: NORMAL %
HDLC SERPL-MCNC: 59 MG/DL (ref 35–70)
HEMOGLOBIN: NORMAL
LDL CHOLESTEROL CALCULATED: 70 MG/DL (ref 0–160)
LYMPHOCYTES ABSOLUTE: NORMAL
LYMPHOCYTES RELATIVE PERCENT: NORMAL
MCH RBC QN AUTO: NORMAL PG
MCHC RBC AUTO-ENTMCNC: NORMAL G/DL
MCV RBC AUTO: NORMAL FL
MONOCYTES ABSOLUTE: NORMAL
MONOCYTES RELATIVE PERCENT: NORMAL
NEUTROPHILS ABSOLUTE: NORMAL
NEUTROPHILS RELATIVE PERCENT: NORMAL
NONHDLC SERPL-MCNC: NORMAL MG/DL
PDW BLD-RTO: NORMAL %
PLATELET # BLD: NORMAL 10*3/UL
PMV BLD AUTO: NORMAL FL
POTASSIUM SERPL-SCNC: NORMAL MMOL/L
RBC # BLD: NORMAL 10*6/UL
SODIUM BLD-SCNC: NORMAL MMOL/L
TOTAL PROTEIN: NORMAL
TRIGL SERPL-MCNC: 227 MG/DL
TSH SERPL DL<=0.05 MIU/L-ACNC: NORMAL M[IU]/L
VLDLC SERPL CALC-MCNC: 45 MG/DL
WBC # BLD: NORMAL 10*3/UL

## 2023-11-22 DIAGNOSIS — E11.65 UNCONTROLLED TYPE 2 DIABETES MELLITUS WITH HYPERGLYCEMIA (HCC): ICD-10-CM

## 2023-11-22 DIAGNOSIS — E78.2 MIXED HYPERLIPIDEMIA: ICD-10-CM

## 2023-11-22 DIAGNOSIS — E03.9 ACQUIRED HYPOTHYROIDISM: ICD-10-CM

## 2023-11-24 SDOH — HEALTH STABILITY: PHYSICAL HEALTH: ON AVERAGE, HOW MANY DAYS PER WEEK DO YOU ENGAGE IN MODERATE TO STRENUOUS EXERCISE (LIKE A BRISK WALK)?: 5 DAYS

## 2023-11-24 SDOH — HEALTH STABILITY: PHYSICAL HEALTH: ON AVERAGE, HOW MANY MINUTES DO YOU ENGAGE IN EXERCISE AT THIS LEVEL?: 30 MIN

## 2023-11-24 ASSESSMENT — LIFESTYLE VARIABLES
HOW OFTEN DO YOU HAVE SIX OR MORE DRINKS ON ONE OCCASION: 1
HOW OFTEN DO YOU HAVE A DRINK CONTAINING ALCOHOL: 1
HOW MANY STANDARD DRINKS CONTAINING ALCOHOL DO YOU HAVE ON A TYPICAL DAY: PATIENT DOES NOT DRINK
HOW OFTEN DO YOU HAVE A DRINK CONTAINING ALCOHOL: NEVER
HOW MANY STANDARD DRINKS CONTAINING ALCOHOL DO YOU HAVE ON A TYPICAL DAY: 0

## 2023-11-24 ASSESSMENT — PATIENT HEALTH QUESTIONNAIRE - PHQ9
SUM OF ALL RESPONSES TO PHQ9 QUESTIONS 1 & 2: 0
SUM OF ALL RESPONSES TO PHQ QUESTIONS 1-9: 0
SUM OF ALL RESPONSES TO PHQ QUESTIONS 1-9: 0
1. LITTLE INTEREST OR PLEASURE IN DOING THINGS: 0
SUM OF ALL RESPONSES TO PHQ QUESTIONS 1-9: 0
2. FEELING DOWN, DEPRESSED OR HOPELESS: 0
SUM OF ALL RESPONSES TO PHQ QUESTIONS 1-9: 0

## 2023-11-27 ENCOUNTER — OFFICE VISIT (OUTPATIENT)
Dept: FAMILY MEDICINE CLINIC | Age: 83
End: 2023-11-27

## 2023-11-27 VITALS
WEIGHT: 175 LBS | OXYGEN SATURATION: 98 % | DIASTOLIC BLOOD PRESSURE: 60 MMHG | BODY MASS INDEX: 32.2 KG/M2 | HEART RATE: 91 BPM | RESPIRATION RATE: 24 BRPM | SYSTOLIC BLOOD PRESSURE: 120 MMHG | HEIGHT: 62 IN | TEMPERATURE: 96.9 F

## 2023-11-27 DIAGNOSIS — K21.9 GASTROESOPHAGEAL REFLUX DISEASE WITHOUT ESOPHAGITIS: ICD-10-CM

## 2023-11-27 DIAGNOSIS — J43.8 OTHER EMPHYSEMA (HCC): ICD-10-CM

## 2023-11-27 DIAGNOSIS — E78.2 MIXED HYPERLIPIDEMIA: ICD-10-CM

## 2023-11-27 DIAGNOSIS — J44.1 COPD EXACERBATION (HCC): ICD-10-CM

## 2023-11-27 DIAGNOSIS — R35.0 FREQUENCY OF URINATION: ICD-10-CM

## 2023-11-27 DIAGNOSIS — Z00.00 MEDICARE ANNUAL WELLNESS VISIT, SUBSEQUENT: Primary | ICD-10-CM

## 2023-11-27 DIAGNOSIS — E03.9 HYPOTHYROIDISM, UNSPECIFIED TYPE: ICD-10-CM

## 2023-11-27 DIAGNOSIS — B37.0 THRUSH: ICD-10-CM

## 2023-11-27 DIAGNOSIS — E11.65 UNCONTROLLED TYPE 2 DIABETES MELLITUS WITH HYPERGLYCEMIA (HCC): ICD-10-CM

## 2023-11-27 RX ORDER — FLUCONAZOLE 100 MG/1
100 TABLET ORAL DAILY
Qty: 10 TABLET | Refills: 0 | Status: SHIPPED | OUTPATIENT
Start: 2023-11-27 | End: 2023-12-07

## 2023-11-27 RX ORDER — OMEPRAZOLE 20 MG/1
40 CAPSULE, DELAYED RELEASE ORAL DAILY
Qty: 180 CAPSULE | Refills: 1 | Status: SHIPPED | OUTPATIENT
Start: 2023-11-27

## 2023-11-27 NOTE — PROGRESS NOTES
Medicare Annual Wellness Visit    Deshawn Thurston is here for Medicare AWV, Thyroid Problem (6 month check up), Hyperlipidemia, and COPD    Assessment & Plan   Medicare annual wellness visit, subsequent  Uncontrolled type 2 diabetes mellitus with hyperglycemia ([address])  COPD exacerbation ([address])  Hypothyroidism, unspecified type  Mixed hyperlipidemia  Other emphysema ([address])  Frequency of urination  -     Culture, Urine; Future    Recommendations for Preventive Services Due: see orders and patient instructions/AVS.  Recommended screening schedule for the next 5-10 years is provided to the patient in written form: see Patient Instructions/AVS.     Return in 3 months (on 2/27/2024) for Medicare Annual Wellness Visit in 1 year. Subjective   Hemoglobin A1C   Date Value Ref Range Status   10/23/2023 7.6 (H) 4.0 - 5.6 % Final       BiPap 12/6 at HS and PRN  Scheduled bronchodilators-brovana and pulmicort BID with xopenex and atrovent QID. May resume Trelegy upon discharge. xopenex nebs prn  Stop Levaquin for COPDE. Azithromycin written for discharge to have on hand  Prednisone daily with taper for discharge-written  Tessalon and mucinex TID  Management of tachycardia per primary  Incentive spirometer   DVT/PE prophylaxis-lovenox   Patient is stable and ok for discharge from a pulmonary standpoint       Hadissues with the [facility] in that they are taking less aware patients and not really taking care of them. Feels like things have changed    Patient's complete Health Risk Assessment and screening values have been reviewed and are found in Flowsheets. The following problems were reviewed today and where indicated follow up appointments were made and/or referrals ordered. The ASCVD Risk score (Raghavendra DK, et al., 2019) failed to calculate for the following reasons:     The 2019 ASCVD risk score is only valid for ages 36 to 78    Positive Risk Factor Screenings with Interventions:                 Weight and

## 2023-11-29 LAB
CULTURE: NORMAL
SPECIMEN DESCRIPTION: NORMAL

## 2023-12-14 ENCOUNTER — TELEPHONE (OUTPATIENT)
Dept: FAMILY MEDICINE CLINIC | Age: 83
End: 2023-12-14

## 2023-12-14 DIAGNOSIS — R19.7 DIARRHEA OF PRESUMED INFECTIOUS ORIGIN: Primary | ICD-10-CM

## 2023-12-14 NOTE — TELEPHONE ENCOUNTER
I pu t in orders for stool culture/ c diff so kendall can collect and send sample to our offic or to lab.   If it goes to lab has to have 2 forms of ID like name PLUS date of birth No

## 2023-12-14 NOTE — TELEPHONE ENCOUNTER
Pt was seen at [facility] yesterday for pulmonology. She increased her Trelegy to 200mg, put her back on prednisone and azithromycin and ordered some breathing tests. The pulmonologist also requested you follow-up on her diarrhea, Sandra Cruz is still having diarrhea. Maybe check for c-diff. She states cornelio would be able to get the culture. She is also requesting a script for Radha    Please advise.

## 2023-12-22 ENCOUNTER — TELEPHONE (OUTPATIENT)
Dept: FAMILY MEDICINE CLINIC | Age: 83
End: 2023-12-22

## 2023-12-22 NOTE — TELEPHONE ENCOUNTER
Her stool cultures a normal.  She should stop her prilosec and see if that helps-  sometimes it triggers diarrhea

## 2024-01-22 DIAGNOSIS — E03.9 ACQUIRED HYPOTHYROIDISM: ICD-10-CM

## 2024-01-22 NOTE — TELEPHONE ENCOUNTER
Medication Refill Request    LOV 11/27/2023  NOV 4/10/2024    Lab Results   Component Value Date    CREATININE 0.5 11/09/2023

## 2024-01-23 RX ORDER — LEVOTHYROXINE SODIUM 0.12 MG/1
125 TABLET ORAL DAILY
Qty: 90 TABLET | Refills: 1 | Status: SHIPPED | OUTPATIENT
Start: 2024-01-23

## 2024-01-23 NOTE — TELEPHONE ENCOUNTER
You cannot sign these orders because information is missing or requires your attention:   This medication cannot be e-prescribed. Patient street address exceeds the maximum 2 lines allowed. Please update patient demographics.    LEVOTHYROXINE SODIUM 125 MCG PO TABS[2577]   It rigo not let me sign order due to above..  can ou fix? And resend Rx to me

## 2024-01-23 NOTE — TELEPHONE ENCOUNTER
I fixed her address-see if it works If not send it back and I will make additional adjustments.

## 2024-01-24 DIAGNOSIS — E78.2 MIXED HYPERLIPIDEMIA: ICD-10-CM

## 2024-01-24 RX ORDER — SIMVASTATIN 40 MG
40 TABLET ORAL NIGHTLY
Qty: 90 TABLET | Refills: 1 | Status: SHIPPED | OUTPATIENT
Start: 2024-01-24

## 2024-04-08 ASSESSMENT — PATIENT HEALTH QUESTIONNAIRE - PHQ9
SUM OF ALL RESPONSES TO PHQ QUESTIONS 1-9: 0
1. LITTLE INTEREST OR PLEASURE IN DOING THINGS: NOT AT ALL
2. FEELING DOWN, DEPRESSED OR HOPELESS: NOT AT ALL
SUM OF ALL RESPONSES TO PHQ9 QUESTIONS 1 & 2: 0
SUM OF ALL RESPONSES TO PHQ9 QUESTIONS 1 & 2: 0
2. FEELING DOWN, DEPRESSED OR HOPELESS: NOT AT ALL
SUM OF ALL RESPONSES TO PHQ QUESTIONS 1-9: 0
1. LITTLE INTEREST OR PLEASURE IN DOING THINGS: NOT AT ALL

## 2024-04-10 ENCOUNTER — OFFICE VISIT (OUTPATIENT)
Dept: FAMILY MEDICINE CLINIC | Age: 84
End: 2024-04-10
Payer: MEDICARE

## 2024-04-10 VITALS
OXYGEN SATURATION: 97 % | HEART RATE: 88 BPM | SYSTOLIC BLOOD PRESSURE: 138 MMHG | BODY MASS INDEX: 32.12 KG/M2 | RESPIRATION RATE: 16 BRPM | DIASTOLIC BLOOD PRESSURE: 68 MMHG | WEIGHT: 175.6 LBS | TEMPERATURE: 96.9 F

## 2024-04-10 DIAGNOSIS — R06.02 SOB (SHORTNESS OF BREATH): ICD-10-CM

## 2024-04-10 DIAGNOSIS — J43.8 OTHER EMPHYSEMA (HCC): ICD-10-CM

## 2024-04-10 DIAGNOSIS — E78.2 MIXED HYPERLIPIDEMIA: ICD-10-CM

## 2024-04-10 DIAGNOSIS — J44.1 COPD EXACERBATION (HCC): Primary | ICD-10-CM

## 2024-04-10 DIAGNOSIS — E11.65 UNCONTROLLED TYPE 2 DIABETES MELLITUS WITH HYPERGLYCEMIA (HCC): ICD-10-CM

## 2024-04-10 DIAGNOSIS — C34.92 ADENOCARCINOMA, LUNG, LEFT (HCC): ICD-10-CM

## 2024-04-10 DIAGNOSIS — E03.9 ACQUIRED HYPOTHYROIDISM: ICD-10-CM

## 2024-04-10 DIAGNOSIS — J44.1 COPD EXACERBATION (HCC): ICD-10-CM

## 2024-04-10 LAB
ALBUMIN SERPL-MCNC: 4.7 G/DL (ref 3.5–5.2)
ALP BLD-CCNC: 76 U/L (ref 35–104)
ALT SERPL-CCNC: 26 U/L (ref 0–32)
ANION GAP SERPL CALCULATED.3IONS-SCNC: 14 MMOL/L (ref 7–16)
AST SERPL-CCNC: 20 U/L (ref 0–31)
BASOPHILS ABSOLUTE: 0.07 K/UL (ref 0–0.2)
BASOPHILS RELATIVE PERCENT: 1 % (ref 0–2)
BILIRUB SERPL-MCNC: 0.2 MG/DL (ref 0–1.2)
BUN BLDV-MCNC: 13 MG/DL (ref 6–23)
CALCIUM SERPL-MCNC: 10.6 MG/DL (ref 8.6–10.2)
CHLORIDE BLD-SCNC: 102 MMOL/L (ref 98–107)
CHOLESTEROL: 165 MG/DL
CO2: 24 MMOL/L (ref 22–29)
CREAT SERPL-MCNC: 0.7 MG/DL (ref 0.5–1)
EOSINOPHILS ABSOLUTE: 0.15 K/UL (ref 0.05–0.5)
EOSINOPHILS RELATIVE PERCENT: 2 % (ref 0–6)
GFR SERPL CREATININE-BSD FRML MDRD: 85 ML/MIN/1.73M2
GLUCOSE BLD-MCNC: 137 MG/DL (ref 74–99)
HBA1C MFR BLD: 6.8 % (ref 4–5.6)
HCT VFR BLD CALC: 43.9 % (ref 34–48)
HDLC SERPL-MCNC: 52 MG/DL
HEMOGLOBIN: 14.4 G/DL (ref 11.5–15.5)
IMMATURE GRANULOCYTES %: 0 % (ref 0–5)
IMMATURE GRANULOCYTES ABSOLUTE: 0.03 K/UL (ref 0–0.58)
LDL CHOLESTEROL: 79 MG/DL
LYMPHOCYTES ABSOLUTE: 1.49 K/UL (ref 1.5–4)
LYMPHOCYTES RELATIVE PERCENT: 21 % (ref 20–42)
MCH RBC QN AUTO: 31 PG (ref 26–35)
MCHC RBC AUTO-ENTMCNC: 32.8 G/DL (ref 32–34.5)
MCV RBC AUTO: 94.4 FL (ref 80–99.9)
MONOCYTES ABSOLUTE: 0.75 K/UL (ref 0.1–0.95)
MONOCYTES RELATIVE PERCENT: 11 % (ref 2–12)
NEUTROPHILS ABSOLUTE: 4.6 K/UL (ref 1.8–7.3)
NEUTROPHILS RELATIVE PERCENT: 65 % (ref 43–80)
PDW BLD-RTO: 12.3 % (ref 11.5–15)
PLATELET # BLD: 219 K/UL (ref 130–450)
PMV BLD AUTO: 12.5 FL (ref 7–12)
POTASSIUM SERPL-SCNC: 4.7 MMOL/L (ref 3.5–5)
RBC # BLD: 4.65 M/UL (ref 3.5–5.5)
SODIUM BLD-SCNC: 140 MMOL/L (ref 132–146)
TOTAL PROTEIN: 7.3 G/DL (ref 6.4–8.3)
TRIGL SERPL-MCNC: 168 MG/DL
TSH SERPL DL<=0.05 MIU/L-ACNC: 0.31 UIU/ML (ref 0.27–4.2)
VLDLC SERPL CALC-MCNC: 34 MG/DL
WBC # BLD: 7.1 K/UL (ref 4.5–11.5)

## 2024-04-10 PROCEDURE — 99214 OFFICE O/P EST MOD 30 MIN: CPT | Performed by: FAMILY MEDICINE

## 2024-04-10 PROCEDURE — 1123F ACP DISCUSS/DSCN MKR DOCD: CPT | Performed by: FAMILY MEDICINE

## 2024-04-10 PROCEDURE — 3044F HG A1C LEVEL LT 7.0%: CPT | Performed by: FAMILY MEDICINE

## 2024-04-10 RX ORDER — FLUTICASONE FUROATE, UMECLIDINIUM BROMIDE AND VILANTEROL TRIFENATATE 200; 62.5; 25 UG/1; UG/1; UG/1
1 POWDER RESPIRATORY (INHALATION) DAILY
Qty: 2 EACH | Refills: 0 | Status: SHIPPED | COMMUNITY
Start: 2024-04-10

## 2024-04-10 NOTE — PROGRESS NOTES
Metabolic Panel; Future    2. Other emphysema (HCC)  Doing better on treligy  Samples given today  Stable, cont meds recheck 6 mos    - fluticasone-umeclidin-vilant (TRELEGY ELLIPTA) 200-62.5-25 MCG/ACT AEPB inhaler; Inhale 1 puff into the lungs daily  Dispense: 2 each; Refill: 0    3. SOB (shortness of breath)  Much better with trelegy  Follows with pulm  Stable, cont meds recheck 6 mos    - fluticasone-umeclidin-vilant (TRELEGY ELLIPTA) 200-62.5-25 MCG/ACT AEPB inhaler; Inhale 1 puff into the lungs daily  Dispense: 2 each; Refill: 0    4. Adenocarcinoma, lung, left (HCC)  No recurrence  Follows with pulm resection is the treatment  Stable, cont meds recheck 6 mos    - fluticasone-umeclidin-vilant (TRELEGY ELLIPTA) 200-62.5-25 MCG/ACT AEPB inhaler; Inhale 1 puff into the lungs daily  Dispense: 2 each; Refill: 0    5. Uncontrolled type 2 diabetes mellitus with hyperglycemia (HCC)  Hemoglobin A1C   Date Value Ref Range Status   04/10/2024 6.8 (H) 4.0 - 5.6 % Final   Presently diet controlled due to side effects of meds  Stable, cont meds recheck 6 mos      - Hemoglobin A1C; Future  - Hemoglobin A1C; Future    6. Acquired hypothyroidism  Lab Results   Component Value Date    TSH 0.31 04/10/2024     On synthroid 125 mcg and continue  Stable, cont meds recheck 6 mos    - TSH; Future  - TSH; Future    7. Mixed hyperlipidemia  *    CHOL 165 -- Final result    04/10/24 1433 TRIG 168 Important  High Final result   04/10/24 1433 HDL 52 -- Final result   11/21/23 0000 LDLCALC 70 -- Final result   04/10/24 1433 LDLCHOLESTEROL 79 -- Final result     - Lipid Panel; Future  - Lipid Panel; Future    On zocor 40 mg  Stable, cont meds recheck 6 mos    SUBJECTIVE    Review of Systems   Constitutional:  Negative for activity change, appetite change, fatigue and unexpected weight change.   HENT:  Negative for congestion.    Eyes: Negative.  Negative for visual disturbance.   Respiratory:  Negative for cough, chest tightness, shortness

## 2024-04-18 ASSESSMENT — ENCOUNTER SYMPTOMS
CHEST TIGHTNESS: 0
SHORTNESS OF BREATH: 0
ALLERGIC/IMMUNOLOGIC NEGATIVE: 1
WHEEZING: 0
COUGH: 0
EYES NEGATIVE: 1
GASTROINTESTINAL NEGATIVE: 1

## 2024-04-29 NOTE — TELEPHONE ENCOUNTER
Medication Refill Request    LOV 4/10/2024  NOV Visit date not found    Lab Results   Component Value Date    CREATININE 0.7 04/10/2024

## 2024-04-30 ENCOUNTER — TELEPHONE (OUTPATIENT)
Dept: FAMILY MEDICINE CLINIC | Age: 84
End: 2024-04-30

## 2024-04-30 DIAGNOSIS — R06.02 SOB (SHORTNESS OF BREATH): ICD-10-CM

## 2024-04-30 DIAGNOSIS — J43.8 OTHER EMPHYSEMA (HCC): ICD-10-CM

## 2024-04-30 DIAGNOSIS — J44.1 COPD EXACERBATION (HCC): ICD-10-CM

## 2024-04-30 DIAGNOSIS — C34.92 ADENOCARCINOMA, LUNG, LEFT (HCC): ICD-10-CM

## 2024-04-30 NOTE — TELEPHONE ENCOUNTER
Trelegy Ellipta and Januvia is $800 to fill    Patient is asking for an alternative cheaper medication to be sent to pharmacy    Please advise

## 2024-05-01 ENCOUNTER — CARE COORDINATION (OUTPATIENT)
Dept: CARE COORDINATION | Age: 84
End: 2024-05-01

## 2024-05-01 RX ORDER — FLUTICASONE FUROATE, UMECLIDINIUM BROMIDE AND VILANTEROL TRIFENATATE 200; 62.5; 25 UG/1; UG/1; UG/1
1 POWDER RESPIRATORY (INHALATION) DAILY
Qty: 2 EACH | Refills: 3 | COMMUNITY
Start: 2024-05-01

## 2024-05-01 NOTE — TELEPHONE ENCOUNTER
Jordyn are you able to help with this patient- in [facility] what can she get that is covered?

## 2024-05-01 NOTE — TELEPHONE ENCOUNTER
LACI-Spoke with patient and notified her #3 samples of Trelegy out front for .  Gave her number of Rx assistance program to check eligibility.  Also, let her know  reached out to Jordyn as well for suggestions.

## 2024-05-01 NOTE — TELEPHONE ENCOUNTER
Spoke to the Pharmacist and patient is in the doughnut hole-Will need to switch medication to generic medication as brand name will be very pricey.  Generic medication will only go up about $1.   We do have #3 Samples of the trelegy ellipta.  Do you want to change the Januvia to Semaglutide, Glipizide, etc?  Please sign Rx to document samples.

## 2024-05-02 NOTE — CARE COORDINATION
Spoke with patient on telephone regarding need for possible Januvia therapeutic alternative.  ACM checked Januvia 's website coupon.  After inputting information, patient not eligible for 's coupon or assistance d/t MCR.  Other sites checked for better pricing and no others found, including GoodRx.  Patient agreed to apply for PAP and spoke with them earlier.  She is awaiting an application for Trelegy assistance and possible Januvia however, it takes time.  Patient was anxious voicing she was out of Januvia.  PCP messaged regarding possible T.I. phoned into pharmacy.  PCP will monitor patient BS at this time and Januvia stopped.  Patient notified by ACM.

## 2024-05-03 NOTE — TELEPHONE ENCOUNTER
Left message for Patient to disregard my message and no need to call back as Jordyn already talked to patient:    Jordyn Sands, DANY  You; Karlee Aguiar MD23 hours ago (10:19 AM)     SUSAN AGUERO    Patient notified to stop Januvia and watch BS. Verbalized understanding.  Patient stated she will report if > 200.    Jordyn Sands RN [facility]  [phone]     Jordyn Sands, Ruth Azar [phone] hours ago (9:35 AM)     Jordyn Sands RNYesterday (9:02 AM)     SUSAN  Spoke with patient on telephone regarding need for possible Januvia therapeutic alternative.  ACM checked Januvia 's website coupon.  After inputting information, patient not eligible for 's coupon or assistance d/t MCR.  Other sites checked for better pricing and no others found, including GoodRx.  Patient agreed to apply for PAP and spoke with them earlier.  She is awaiting an application for Trelegy assistance and possible Januvia however, it takes time.  Patient was anxious voicing she was out of Januvia.  PCP messaged regarding possible T.I. phoned into pharmacy.  PCP will monitor patient BS at this time and Januvia stopped.  Patient notified by ACM.

## 2024-06-10 DIAGNOSIS — J43.8 OTHER EMPHYSEMA (HCC): ICD-10-CM

## 2024-06-10 DIAGNOSIS — J44.1 COPD EXACERBATION (HCC): ICD-10-CM

## 2024-06-10 DIAGNOSIS — R06.02 SOB (SHORTNESS OF BREATH): ICD-10-CM

## 2024-06-10 DIAGNOSIS — C34.92 ADENOCARCINOMA, LUNG, LEFT (HCC): ICD-10-CM

## 2024-06-10 RX ORDER — FLUTICASONE FUROATE, UMECLIDINIUM BROMIDE AND VILANTEROL TRIFENATATE 200; 62.5; 25 UG/1; UG/1; UG/1
1 POWDER RESPIRATORY (INHALATION) DAILY
Qty: 3 EACH | Refills: 3 | Status: SHIPPED | OUTPATIENT
Start: 2024-06-10

## 2024-07-12 DIAGNOSIS — E78.2 MIXED HYPERLIPIDEMIA: ICD-10-CM

## 2024-07-12 DIAGNOSIS — E03.9 ACQUIRED HYPOTHYROIDISM: ICD-10-CM

## 2024-07-12 RX ORDER — LEVOTHYROXINE SODIUM 0.12 MG/1
125 TABLET ORAL DAILY
Qty: 90 TABLET | Refills: 1 | Status: SHIPPED | OUTPATIENT
Start: 2024-07-12

## 2024-07-12 RX ORDER — SIMVASTATIN 40 MG
40 TABLET ORAL NIGHTLY
Qty: 90 TABLET | Refills: 1 | Status: SHIPPED | OUTPATIENT
Start: 2024-07-12

## 2024-07-12 NOTE — TELEPHONE ENCOUNTER
Medication Refill Request    LOV 4/10/2024  NOV 12/6/2024    Lab Results   Component Value Date    CREATININE 0.7 04/10/2024

## 2024-07-24 ENCOUNTER — TELEPHONE (OUTPATIENT)
Dept: FAMILY MEDICINE CLINIC | Age: 84
End: 2024-07-24

## 2024-07-24 NOTE — TELEPHONE ENCOUNTER
I agree and ok to stop finger stick sugars.  Can we get her scheduled for awv in sept or oct?  I wll not be here early dec and she can ocme earlier

## 2024-07-24 NOTE — TELEPHONE ENCOUNTER
Olga, Nurse at the [facility] [place] called, and I spoke with her.  She states that Ruth is having pain when her BG is checked.  Currently, the orders are for 3 checks/week on Mon, Wed, and Fri.  The patient is not receiving insulin coverage.  Her BG rarely goes >150.  Olga is asking if they can have the BG checks reduced to once monthly or none at all (using the A1c checks every 6 months for monitoring) due to the discomfort expressed by the patient.  If the BG checks cannot be reduced, Olga is asking if Ruth can have the Onofre CGM.  Please advise.

## 2024-07-30 ENCOUNTER — TELEPHONE (OUTPATIENT)
Dept: FAMILY MEDICINE CLINIC | Age: 84
End: 2024-07-30

## 2024-07-30 NOTE — TELEPHONE ENCOUNTER
Good Morning! I received a fax from [facility] [place]. It states that resident(Ruth Jin) would like to see if Insurance will cover a Freestyle Onofre for blood sugar checks. She is open to anything \"like\" this as well. Her fingers are very sore and she is hoping this would help.  The number to the Facility is [phone], fax [phone].      Her Pharmacy is [facility] in [place] phone number [phone], Fax [phone]

## 2024-07-31 NOTE — TELEPHONE ENCOUNTER
Left message for the nurse to call office back on Dr. Karlee Aguiar's message of D/C finger sticks.  Nurse will ask for either Liz or Melissa

## 2024-07-31 NOTE — TELEPHONE ENCOUNTER
We are stopping fingerstick glucose and will monitor q 6 mos with regular venous lab work  she is only on januvia for sugars.  No need for Onofre now nor would it be covered

## 2024-09-05 ENCOUNTER — TELEPHONE (OUTPATIENT)
Dept: FAMILY MEDICINE CLINIC | Age: 84
End: 2024-09-05

## 2024-09-06 ENCOUNTER — TELEPHONE (OUTPATIENT)
Dept: FAMILY MEDICINE CLINIC | Age: 84
End: 2024-09-06

## 2024-09-10 LAB
ALBUMIN: NORMAL
ALP BLD-CCNC: NORMAL U/L
ALT SERPL-CCNC: NORMAL U/L
ANION GAP SERPL CALCULATED.3IONS-SCNC: NORMAL MMOL/L
AST SERPL-CCNC: NORMAL U/L
BASOPHILS ABSOLUTE: NORMAL
BASOPHILS RELATIVE PERCENT: NORMAL
BILIRUB SERPL-MCNC: NORMAL MG/DL
BUN BLDV-MCNC: NORMAL MG/DL
CALCIUM SERPL-MCNC: NORMAL MG/DL
CHLORIDE BLD-SCNC: NORMAL MMOL/L
CHOLESTEROL, TOTAL: NORMAL
CHOLESTEROL/HDL RATIO: NORMAL
CO2: NORMAL
CREAT SERPL-MCNC: NORMAL MG/DL
EOSINOPHILS ABSOLUTE: NORMAL
EOSINOPHILS RELATIVE PERCENT: NORMAL
ESTIMATED AVERAGE GLUCOSE: NORMAL
GFR, ESTIMATED: NORMAL
GLUCOSE BLD-MCNC: NORMAL MG/DL
HBA1C MFR BLD: NORMAL %
HCT VFR BLD CALC: NORMAL %
HDLC SERPL-MCNC: NORMAL MG/DL
HEMOGLOBIN: NORMAL
LDL CHOLESTEROL: NORMAL
LYMPHOCYTES ABSOLUTE: NORMAL
LYMPHOCYTES RELATIVE PERCENT: NORMAL
MCH RBC QN AUTO: NORMAL PG
MCHC RBC AUTO-ENTMCNC: NORMAL G/DL
MCV RBC AUTO: NORMAL FL
MONOCYTES ABSOLUTE: NORMAL
MONOCYTES RELATIVE PERCENT: NORMAL
NEUTROPHILS ABSOLUTE: NORMAL
NEUTROPHILS RELATIVE PERCENT: NORMAL
NONHDLC SERPL-MCNC: NORMAL MG/DL
PLATELET # BLD: NORMAL 10*3/UL
PMV BLD AUTO: NORMAL FL
POTASSIUM SERPL-SCNC: NORMAL MMOL/L
RBC # BLD: NORMAL 10*6/UL
SODIUM BLD-SCNC: NORMAL MMOL/L
TOTAL PROTEIN: NORMAL
TRIGL SERPL-MCNC: NORMAL MG/DL
TSH SERPL DL<=0.05 MIU/L-ACNC: NORMAL M[IU]/L
VLDLC SERPL CALC-MCNC: NORMAL MG/DL
WBC # BLD: NORMAL 10*3/UL

## 2024-09-11 DIAGNOSIS — E11.65 UNCONTROLLED TYPE 2 DIABETES MELLITUS WITH HYPERGLYCEMIA (HCC): ICD-10-CM

## 2024-09-11 DIAGNOSIS — E03.9 ACQUIRED HYPOTHYROIDISM: ICD-10-CM

## 2024-09-11 DIAGNOSIS — J44.1 COPD EXACERBATION (HCC): ICD-10-CM

## 2024-09-11 DIAGNOSIS — E78.2 MIXED HYPERLIPIDEMIA: ICD-10-CM

## 2024-09-20 ENCOUNTER — OFFICE VISIT (OUTPATIENT)
Dept: FAMILY MEDICINE CLINIC | Age: 84
End: 2024-09-20
Payer: MEDICARE

## 2024-09-20 VITALS
TEMPERATURE: 96.8 F | SYSTOLIC BLOOD PRESSURE: 130 MMHG | HEART RATE: 70 BPM | OXYGEN SATURATION: 96 % | WEIGHT: 174.2 LBS | HEIGHT: 62 IN | DIASTOLIC BLOOD PRESSURE: 80 MMHG | BODY MASS INDEX: 32.06 KG/M2

## 2024-09-20 DIAGNOSIS — J43.8 OTHER EMPHYSEMA (HCC): ICD-10-CM

## 2024-09-20 DIAGNOSIS — E11.65 UNCONTROLLED TYPE 2 DIABETES MELLITUS WITH HYPERGLYCEMIA (HCC): ICD-10-CM

## 2024-09-20 DIAGNOSIS — E03.9 HYPOTHYROIDISM, UNSPECIFIED TYPE: ICD-10-CM

## 2024-09-20 DIAGNOSIS — Z00.00 MEDICARE ANNUAL WELLNESS VISIT, SUBSEQUENT: Primary | ICD-10-CM

## 2024-09-20 DIAGNOSIS — C34.10 MALIGNANT NEOPLASM OF UPPER LOBE OF LUNG, UNSPECIFIED LATERALITY (HCC): ICD-10-CM

## 2024-09-20 DIAGNOSIS — E78.2 MIXED HYPERLIPIDEMIA: ICD-10-CM

## 2024-09-20 DIAGNOSIS — Z12.31 OTHER SCREENING MAMMOGRAM: ICD-10-CM

## 2024-09-20 PROCEDURE — 1123F ACP DISCUSS/DSCN MKR DOCD: CPT | Performed by: FAMILY MEDICINE

## 2024-09-20 PROCEDURE — G0439 PPPS, SUBSEQ VISIT: HCPCS | Performed by: FAMILY MEDICINE

## 2024-09-20 PROCEDURE — 3044F HG A1C LEVEL LT 7.0%: CPT | Performed by: FAMILY MEDICINE

## 2024-09-20 RX ORDER — LEVOTHYROXINE SODIUM 112 UG/1
112 TABLET ORAL DAILY
Qty: 90 TABLET | Refills: 1 | Status: SHIPPED | OUTPATIENT
Start: 2024-09-20

## 2024-09-20 RX ORDER — LEVOTHYROXINE SODIUM 112 UG/1
112 TABLET ORAL DAILY
Qty: 90 TABLET | Refills: 1 | Status: SHIPPED
Start: 2024-09-20 | End: 2024-09-20 | Stop reason: SDUPTHER

## 2024-09-20 SDOH — ECONOMIC STABILITY: FOOD INSECURITY: WITHIN THE PAST 12 MONTHS, THE FOOD YOU BOUGHT JUST DIDN'T LAST AND YOU DIDN'T HAVE MONEY TO GET MORE.: NEVER TRUE

## 2024-09-20 SDOH — ECONOMIC STABILITY: FOOD INSECURITY: WITHIN THE PAST 12 MONTHS, YOU WORRIED THAT YOUR FOOD WOULD RUN OUT BEFORE YOU GOT MONEY TO BUY MORE.: NEVER TRUE

## 2024-09-20 SDOH — ECONOMIC STABILITY: INCOME INSECURITY: HOW HARD IS IT FOR YOU TO PAY FOR THE VERY BASICS LIKE FOOD, HOUSING, MEDICAL CARE, AND HEATING?: NOT HARD AT ALL

## 2024-09-20 ASSESSMENT — PATIENT HEALTH QUESTIONNAIRE - PHQ9
SUM OF ALL RESPONSES TO PHQ QUESTIONS 1-9: 0
1. LITTLE INTEREST OR PLEASURE IN DOING THINGS: NOT AT ALL
SUM OF ALL RESPONSES TO PHQ9 QUESTIONS 1 & 2: 0
SUM OF ALL RESPONSES TO PHQ QUESTIONS 1-9: 0
2. FEELING DOWN, DEPRESSED OR HOPELESS: NOT AT ALL

## 2024-09-20 ASSESSMENT — LIFESTYLE VARIABLES
HOW OFTEN DO YOU HAVE A DRINK CONTAINING ALCOHOL: NEVER
HOW OFTEN DO YOU HAVE A DRINK CONTAINING ALCOHOL: NEVER
HOW MANY STANDARD DRINKS CONTAINING ALCOHOL DO YOU HAVE ON A TYPICAL DAY: PATIENT DOES NOT DRINK

## 2024-09-20 NOTE — PROGRESS NOTES
Medicare Annual Wellness Visit    Ruth Jin is here for Medicare AWV (Wants mammogram/Pneumonia shot)    Assessment & Plan   Medicare annual wellness visit, subsequent  Other screening mammogram  -     AGNES DIGITAL SCREEN BILATERAL PER PROTOCOL; Future  Malignant neoplasm of upper lobe of lung, unspecified laterality (HCC)    Follows with  [facility] pulm and  also local     No recurrence of malignancy  Hypothyroidism, unspecified type  Mixed hyperlipidemia  Uncontrolled type 2 diabetes mellitus with hyperglycemia (HCC)       Hemoglobin A1C   Date Value Ref Range Status   04/10/2024 6.8 (H) 4.0 - 5.6 % Final        No meds for now  Other emphysema (HCC)    Follow with pulm trelegy helping a lot    Recommendations for Preventive Services Due: see orders and patient instructions/AVS.  Recommended screening schedule for the next 5-10 years is provided to the patient in written form: see Patient Instructions/AVS.     Return for Medicare Annual Wellness Visit in 1 year.     Subjective       Patient's complete Health Risk Assessment and screening values have been reviewed and are found in Flowsheets. The following problems were reviewed today and where indicated follow up appointments were made and/or referrals ordered.    Positive Risk Factor Screenings with Interventions:       Cognitive:   Clock Drawing Test (CDT): (!) Abnormal  Words recalled: 3 Words Recalled  Total Score: 3  Total Score Interpretation: Normal Mini-Cog  Interventions:  Patient declines any further evaluation or treatment              Abnormal BMI (obese):  Body mass index is 31.86 kg/m². (!) Abnormal  Interventions:  Patient declines any further evaluation or treatment           Safety:  Do you have any tripping hazards - loose or unsecured carpets or rugs?: (!) Yes  Interventions:       ADL's:   Patient reports needing help with:  Select all that apply: (!) Laundry, Housekeeping, Shopping, Food Preparation, Transportation, Taking Medications,

## 2024-09-24 DIAGNOSIS — E03.9 HYPOTHYROIDISM, UNSPECIFIED TYPE: ICD-10-CM

## 2024-09-24 NOTE — TELEPHONE ENCOUNTER
Contacted [facility] mail order pharmacy to see if prescriptions were sent in for patient. It appears though her chart they were sent in. However after talking to pharmacy they do not see any on their end. It was also showing that patient had not yet set up a user profile. Until she does so they will not be able to fill her medications. She will need to this at pharmacy.[facility]ron Will reach out to patient       Candice Ellison RN     Medication Refill Request    LOV 9/20/2024  NOV Visit date not found    Lab Results   Component Value Date    CREATININE 0.7 04/10/2024

## 2024-09-26 RX ORDER — LEVOTHYROXINE SODIUM 112 UG/1
112 TABLET ORAL DAILY
Qty: 90 TABLET | Refills: 1 | OUTPATIENT
Start: 2024-09-26

## 2024-11-11 ENCOUNTER — HOSPITAL ENCOUNTER (OUTPATIENT)
Dept: GENERAL RADIOLOGY | Age: 84
Discharge: HOME | End: 2024-11-13
Attending: FAMILY MEDICINE
Payer: MEDICARE

## 2024-11-11 DIAGNOSIS — Z12.31 OTHER SCREENING MAMMOGRAM: ICD-10-CM

## 2024-11-11 PROCEDURE — 77063 BREAST TOMOSYNTHESIS BI: CPT

## 2025-06-30 ENCOUNTER — HOSPITAL ENCOUNTER (INPATIENT)
Age: 85
LOS: 3 days | Discharge: OTHER INSTITUTION | DRG: 189 | End: 2025-07-03
Attending: EMERGENCY MEDICINE | Admitting: INTERNAL MEDICINE
Payer: MEDICARE

## 2025-06-30 ENCOUNTER — APPOINTMENT (OUTPATIENT)
Dept: CT IMAGING | Age: 85
DRG: 189 | End: 2025-06-30
Payer: MEDICARE

## 2025-06-30 DIAGNOSIS — R06.02 SHORTNESS OF BREATH: ICD-10-CM

## 2025-06-30 DIAGNOSIS — J96.01 ACUTE HYPOXIC RESPIRATORY FAILURE (HCC): Primary | ICD-10-CM

## 2025-06-30 DIAGNOSIS — J40 BRONCHITIS: ICD-10-CM

## 2025-06-30 LAB
ALBUMIN SERPL-MCNC: 4.2 G/DL (ref 3.5–5.2)
ALP SERPL-CCNC: 82 U/L (ref 35–104)
ALT SERPL-CCNC: 49 U/L (ref 0–35)
ANION GAP SERPL CALCULATED.3IONS-SCNC: 13 MMOL/L (ref 7–16)
AST SERPL-CCNC: 27 U/L (ref 0–35)
B PARAP IS1001 DNA NPH QL NAA+NON-PROBE: NOT DETECTED
B PERT DNA SPEC QL NAA+PROBE: NOT DETECTED
BASOPHILS # BLD: 0.07 K/UL (ref 0–0.2)
BASOPHILS NFR BLD: 1 % (ref 0–2)
BILIRUB SERPL-MCNC: 0.5 MG/DL (ref 0–1.2)
BNP SERPL-MCNC: <36 PG/ML (ref 0–450)
BUN SERPL-MCNC: 7 MG/DL (ref 8–23)
C PNEUM DNA NPH QL NAA+NON-PROBE: NOT DETECTED
CALCIUM SERPL-MCNC: 10.5 MG/DL (ref 8.8–10.2)
CHLORIDE SERPL-SCNC: 101 MMOL/L (ref 98–107)
CO2 SERPL-SCNC: 23 MMOL/L (ref 22–29)
CREAT SERPL-MCNC: 0.6 MG/DL (ref 0.5–1)
EKG ATRIAL RATE: 84 BPM
EKG P AXIS: 72 DEGREES
EKG P-R INTERVAL: 186 MS
EKG Q-T INTERVAL: 396 MS
EKG QRS DURATION: 132 MS
EKG QTC CALCULATION (BAZETT): 467 MS
EKG R AXIS: -65 DEGREES
EKG T AXIS: 22 DEGREES
EKG VENTRICULAR RATE: 84 BPM
EOSINOPHIL # BLD: 0.4 K/UL (ref 0.05–0.5)
EOSINOPHILS RELATIVE PERCENT: 6 % (ref 0–6)
ERYTHROCYTE [DISTWIDTH] IN BLOOD BY AUTOMATED COUNT: 12.2 % (ref 11.5–15)
FLUAV RNA NPH QL NAA+NON-PROBE: NOT DETECTED
FLUBV RNA NPH QL NAA+NON-PROBE: NOT DETECTED
GFR, ESTIMATED: 88 ML/MIN/1.73M2
GLUCOSE BLD-MCNC: 206 MG/DL (ref 74–99)
GLUCOSE SERPL-MCNC: 213 MG/DL (ref 74–99)
HADV DNA NPH QL NAA+NON-PROBE: NOT DETECTED
HCOV 229E RNA NPH QL NAA+NON-PROBE: NOT DETECTED
HCOV HKU1 RNA NPH QL NAA+NON-PROBE: NOT DETECTED
HCOV NL63 RNA NPH QL NAA+NON-PROBE: NOT DETECTED
HCOV OC43 RNA NPH QL NAA+NON-PROBE: NOT DETECTED
HCT VFR BLD AUTO: 40.7 % (ref 34–48)
HGB BLD-MCNC: 14.2 G/DL (ref 11.5–15.5)
HMPV RNA NPH QL NAA+NON-PROBE: NOT DETECTED
HPIV1 RNA NPH QL NAA+NON-PROBE: NOT DETECTED
HPIV2 RNA NPH QL NAA+NON-PROBE: NOT DETECTED
HPIV3 RNA NPH QL NAA+NON-PROBE: NOT DETECTED
HPIV4 RNA NPH QL NAA+NON-PROBE: NOT DETECTED
IMM GRANULOCYTES # BLD AUTO: 0.03 K/UL (ref 0–0.58)
IMM GRANULOCYTES NFR BLD: 1 % (ref 0–5)
LYMPHOCYTES NFR BLD: 1.13 K/UL (ref 1.5–4)
LYMPHOCYTES RELATIVE PERCENT: 17 % (ref 20–42)
M PNEUMO DNA NPH QL NAA+NON-PROBE: NOT DETECTED
MAGNESIUM SERPL-MCNC: 2 MG/DL (ref 1.6–2.4)
MCH RBC QN AUTO: 31.1 PG (ref 26–35)
MCHC RBC AUTO-ENTMCNC: 34.9 G/DL (ref 32–34.5)
MCV RBC AUTO: 89.3 FL (ref 80–99.9)
MONOCYTES NFR BLD: 0.55 K/UL (ref 0.1–0.95)
MONOCYTES NFR BLD: 8 % (ref 2–12)
NEUTROPHILS NFR BLD: 67 % (ref 43–80)
NEUTS SEG NFR BLD: 4.33 K/UL (ref 1.8–7.3)
PLATELET # BLD AUTO: 206 K/UL (ref 130–450)
PMV BLD AUTO: 11 FL (ref 7–12)
POTASSIUM SERPL-SCNC: 4.3 MMOL/L (ref 3.5–5.1)
PROCALCITONIN SERPL-MCNC: 0.06 NG/ML (ref 0–0.08)
PROT SERPL-MCNC: 6.9 G/DL (ref 6.4–8.3)
RBC # BLD AUTO: 4.56 M/UL (ref 3.5–5.5)
RSV RNA NPH QL NAA+NON-PROBE: NOT DETECTED
RV+EV RNA NPH QL NAA+NON-PROBE: NOT DETECTED
SARS-COV-2 RNA NPH QL NAA+NON-PROBE: NOT DETECTED
SODIUM SERPL-SCNC: 137 MMOL/L (ref 136–145)
SPECIMEN DESCRIPTION: NORMAL
TROPONIN I SERPL HS-MCNC: 9 NG/L (ref 0–14)
WBC OTHER # BLD: 6.5 K/UL (ref 4.5–11.5)

## 2025-06-30 PROCEDURE — 80053 COMPREHEN METABOLIC PANEL: CPT

## 2025-06-30 PROCEDURE — 71275 CT ANGIOGRAPHY CHEST: CPT

## 2025-06-30 PROCEDURE — 6360000002 HC RX W HCPCS

## 2025-06-30 PROCEDURE — 85025 COMPLETE CBC W/AUTO DIFF WBC: CPT

## 2025-06-30 PROCEDURE — 84145 PROCALCITONIN (PCT): CPT

## 2025-06-30 PROCEDURE — 6370000000 HC RX 637 (ALT 250 FOR IP): Performed by: EMERGENCY MEDICINE

## 2025-06-30 PROCEDURE — 6360000004 HC RX CONTRAST MEDICATION: Performed by: RADIOLOGY

## 2025-06-30 PROCEDURE — 94640 AIRWAY INHALATION TREATMENT: CPT

## 2025-06-30 PROCEDURE — 82962 GLUCOSE BLOOD TEST: CPT

## 2025-06-30 PROCEDURE — 83735 ASSAY OF MAGNESIUM: CPT

## 2025-06-30 PROCEDURE — 99223 1ST HOSP IP/OBS HIGH 75: CPT | Performed by: INTERNAL MEDICINE

## 2025-06-30 PROCEDURE — 2580000003 HC RX 258: Performed by: EMERGENCY MEDICINE

## 2025-06-30 PROCEDURE — 2060000000 HC ICU INTERMEDIATE R&B

## 2025-06-30 PROCEDURE — 93005 ELECTROCARDIOGRAM TRACING: CPT | Performed by: EMERGENCY MEDICINE

## 2025-06-30 PROCEDURE — 2500000003 HC RX 250 WO HCPCS

## 2025-06-30 PROCEDURE — 99285 EMERGENCY DEPT VISIT HI MDM: CPT

## 2025-06-30 PROCEDURE — 83880 ASSAY OF NATRIURETIC PEPTIDE: CPT

## 2025-06-30 PROCEDURE — 93010 ELECTROCARDIOGRAM REPORT: CPT | Performed by: INTERNAL MEDICINE

## 2025-06-30 PROCEDURE — 0202U NFCT DS 22 TRGT SARS-COV-2: CPT

## 2025-06-30 PROCEDURE — 2700000000 HC OXYGEN THERAPY PER DAY

## 2025-06-30 PROCEDURE — 84484 ASSAY OF TROPONIN QUANT: CPT

## 2025-06-30 PROCEDURE — 6370000000 HC RX 637 (ALT 250 FOR IP)

## 2025-06-30 RX ORDER — ENOXAPARIN SODIUM 100 MG/ML
40 INJECTION SUBCUTANEOUS DAILY
Status: DISCONTINUED | OUTPATIENT
Start: 2025-06-30 | End: 2025-07-03 | Stop reason: HOSPADM

## 2025-06-30 RX ORDER — LORAZEPAM 1 MG/1
1 TABLET ORAL ONCE
Status: COMPLETED | OUTPATIENT
Start: 2025-06-30 | End: 2025-06-30

## 2025-06-30 RX ORDER — OMEPRAZOLE 20 MG/1
20 CAPSULE, DELAYED RELEASE ORAL 2 TIMES DAILY
COMMUNITY

## 2025-06-30 RX ORDER — PANTOPRAZOLE SODIUM 40 MG/1
40 TABLET, DELAYED RELEASE ORAL
Status: DISCONTINUED | OUTPATIENT
Start: 2025-07-01 | End: 2025-07-03 | Stop reason: HOSPADM

## 2025-06-30 RX ORDER — POTASSIUM CHLORIDE 7.45 MG/ML
10 INJECTION INTRAVENOUS PRN
Status: DISCONTINUED | OUTPATIENT
Start: 2025-06-30 | End: 2025-07-02

## 2025-06-30 RX ORDER — ALBUTEROL SULFATE 90 UG/1
2 INHALANT RESPIRATORY (INHALATION) EVERY 6 HOURS PRN
Status: DISCONTINUED | OUTPATIENT
Start: 2025-06-30 | End: 2025-06-30 | Stop reason: SDUPTHER

## 2025-06-30 RX ORDER — CETIRIZINE HYDROCHLORIDE 10 MG/1
10 TABLET ORAL DAILY PRN
Status: DISCONTINUED | OUTPATIENT
Start: 2025-06-30 | End: 2025-07-03 | Stop reason: HOSPADM

## 2025-06-30 RX ORDER — BISACODYL 5 MG/1
5 TABLET, DELAYED RELEASE ORAL DAILY PRN
Status: DISCONTINUED | OUTPATIENT
Start: 2025-06-30 | End: 2025-07-03 | Stop reason: HOSPADM

## 2025-06-30 RX ORDER — HYDROXYZINE HYDROCHLORIDE 25 MG/1
25 TABLET, FILM COATED ORAL EVERY 6 HOURS PRN
COMMUNITY

## 2025-06-30 RX ORDER — POLYETHYLENE GLYCOL 3350 17 G/17G
17 POWDER, FOR SOLUTION ORAL DAILY PRN
Status: DISCONTINUED | OUTPATIENT
Start: 2025-06-30 | End: 2025-07-03 | Stop reason: HOSPADM

## 2025-06-30 RX ORDER — LATANOPROST 50 UG/ML
1 SOLUTION/ DROPS OPHTHALMIC NIGHTLY
Status: DISCONTINUED | OUTPATIENT
Start: 2025-06-30 | End: 2025-07-03 | Stop reason: HOSPADM

## 2025-06-30 RX ORDER — MAGNESIUM SULFATE IN WATER 40 MG/ML
2000 INJECTION, SOLUTION INTRAVENOUS PRN
Status: DISCONTINUED | OUTPATIENT
Start: 2025-06-30 | End: 2025-07-02

## 2025-06-30 RX ORDER — ROSUVASTATIN CALCIUM 20 MG/1
20 TABLET, COATED ORAL DAILY
Status: ON HOLD | COMMUNITY
End: 2025-07-02

## 2025-06-30 RX ORDER — HYDRALAZINE HYDROCHLORIDE 20 MG/ML
10 INJECTION INTRAMUSCULAR; INTRAVENOUS EVERY 6 HOURS PRN
Status: DISCONTINUED | OUTPATIENT
Start: 2025-06-30 | End: 2025-07-03 | Stop reason: HOSPADM

## 2025-06-30 RX ORDER — ROSUVASTATIN CALCIUM 20 MG/1
20 TABLET, COATED ORAL DAILY
Status: DISCONTINUED | OUTPATIENT
Start: 2025-07-01 | End: 2025-07-03 | Stop reason: HOSPADM

## 2025-06-30 RX ORDER — PREDNISONE 20 MG/1
20 TABLET ORAL DAILY
Status: ON HOLD | COMMUNITY
End: 2025-07-03 | Stop reason: HOSPADM

## 2025-06-30 RX ORDER — BUDESONIDE 0.5 MG/2ML
1 INHALANT ORAL
Status: DISCONTINUED | OUTPATIENT
Start: 2025-06-30 | End: 2025-07-03 | Stop reason: HOSPADM

## 2025-06-30 RX ORDER — HYDROXYZINE PAMOATE 25 MG/1
25 CAPSULE ORAL EVERY 6 HOURS PRN
Status: DISCONTINUED | OUTPATIENT
Start: 2025-06-30 | End: 2025-07-03 | Stop reason: HOSPADM

## 2025-06-30 RX ORDER — PROMETHAZINE HYDROCHLORIDE 25 MG/1
25 TABLET ORAL EVERY 6 HOURS PRN
Status: DISCONTINUED | OUTPATIENT
Start: 2025-06-30 | End: 2025-07-03 | Stop reason: HOSPADM

## 2025-06-30 RX ORDER — CHOLECALCIFEROL (VITAMIN D3) 1250 MCG
CAPSULE ORAL
COMMUNITY

## 2025-06-30 RX ORDER — SODIUM CHLORIDE 9 MG/ML
INJECTION, SOLUTION INTRAVENOUS PRN
Status: DISCONTINUED | OUTPATIENT
Start: 2025-06-30 | End: 2025-07-03 | Stop reason: HOSPADM

## 2025-06-30 RX ORDER — ALBUTEROL SULFATE 0.83 MG/ML
2.5 SOLUTION RESPIRATORY (INHALATION) EVERY 6 HOURS PRN
Status: DISCONTINUED | OUTPATIENT
Start: 2025-06-30 | End: 2025-07-03 | Stop reason: HOSPADM

## 2025-06-30 RX ORDER — GLUCAGON 1 MG/ML
1 KIT INJECTION PRN
Status: DISCONTINUED | OUTPATIENT
Start: 2025-06-30 | End: 2025-07-02 | Stop reason: SDUPTHER

## 2025-06-30 RX ORDER — BISACODYL 5 MG/1
5 TABLET, DELAYED RELEASE ORAL DAILY PRN
COMMUNITY

## 2025-06-30 RX ORDER — INSULIN LISPRO 100 [IU]/ML
0-4 INJECTION, SOLUTION INTRAVENOUS; SUBCUTANEOUS
Status: DISCONTINUED | OUTPATIENT
Start: 2025-06-30 | End: 2025-07-02 | Stop reason: SDUPTHER

## 2025-06-30 RX ORDER — POTASSIUM CHLORIDE 1500 MG/1
40 TABLET, EXTENDED RELEASE ORAL PRN
Status: DISCONTINUED | OUTPATIENT
Start: 2025-06-30 | End: 2025-07-02

## 2025-06-30 RX ORDER — ACETAMINOPHEN 325 MG/1
650 TABLET ORAL EVERY 6 HOURS PRN
Status: DISCONTINUED | OUTPATIENT
Start: 2025-06-30 | End: 2025-07-03 | Stop reason: HOSPADM

## 2025-06-30 RX ORDER — ALBUTEROL SULFATE 0.83 MG/ML
2.5 SOLUTION RESPIRATORY (INHALATION) EVERY 6 HOURS PRN
COMMUNITY

## 2025-06-30 RX ORDER — ARFORMOTEROL TARTRATE 15 UG/2ML
15 SOLUTION RESPIRATORY (INHALATION)
Status: DISCONTINUED | OUTPATIENT
Start: 2025-06-30 | End: 2025-07-03 | Stop reason: HOSPADM

## 2025-06-30 RX ORDER — 0.9 % SODIUM CHLORIDE 0.9 %
500 INTRAVENOUS SOLUTION INTRAVENOUS ONCE
Status: COMPLETED | OUTPATIENT
Start: 2025-06-30 | End: 2025-06-30

## 2025-06-30 RX ORDER — FLUTICASONE PROPIONATE 50 MCG
2 SPRAY, SUSPENSION (ML) NASAL DAILY PRN
Status: DISCONTINUED | OUTPATIENT
Start: 2025-06-30 | End: 2025-07-03 | Stop reason: HOSPADM

## 2025-06-30 RX ORDER — DEXTROSE MONOHYDRATE 100 MG/ML
INJECTION, SOLUTION INTRAVENOUS CONTINUOUS PRN
Status: DISCONTINUED | OUTPATIENT
Start: 2025-06-30 | End: 2025-07-02 | Stop reason: SDUPTHER

## 2025-06-30 RX ORDER — CETIRIZINE HYDROCHLORIDE 10 MG/1
10 TABLET ORAL DAILY PRN
COMMUNITY

## 2025-06-30 RX ORDER — ACETAMINOPHEN 650 MG/1
650 SUPPOSITORY RECTAL EVERY 6 HOURS PRN
Status: DISCONTINUED | OUTPATIENT
Start: 2025-06-30 | End: 2025-07-03 | Stop reason: HOSPADM

## 2025-06-30 RX ORDER — DORZOLAMIDE HCL 20 MG/ML
1 SOLUTION/ DROPS OPHTHALMIC 3 TIMES DAILY
Status: DISCONTINUED | OUTPATIENT
Start: 2025-06-30 | End: 2025-07-03 | Stop reason: HOSPADM

## 2025-06-30 RX ORDER — IOPAMIDOL 755 MG/ML
75 INJECTION, SOLUTION INTRAVASCULAR
Status: COMPLETED | OUTPATIENT
Start: 2025-06-30 | End: 2025-06-30

## 2025-06-30 RX ORDER — SODIUM CHLORIDE 0.9 % (FLUSH) 0.9 %
5-40 SYRINGE (ML) INJECTION EVERY 12 HOURS SCHEDULED
Status: DISCONTINUED | OUTPATIENT
Start: 2025-06-30 | End: 2025-07-03 | Stop reason: HOSPADM

## 2025-06-30 RX ORDER — LEVOTHYROXINE SODIUM 125 UG/1
125 TABLET ORAL DAILY
COMMUNITY

## 2025-06-30 RX ORDER — SODIUM CHLORIDE 0.9 % (FLUSH) 0.9 %
5-40 SYRINGE (ML) INJECTION PRN
Status: DISCONTINUED | OUTPATIENT
Start: 2025-06-30 | End: 2025-07-03 | Stop reason: HOSPADM

## 2025-06-30 RX ADMIN — INSULIN LISPRO 1 UNITS: 100 INJECTION, SOLUTION INTRAVENOUS; SUBCUTANEOUS at 20:35

## 2025-06-30 RX ADMIN — IPRATROPIUM BROMIDE 0.5 MG: 0.5 SOLUTION RESPIRATORY (INHALATION) at 19:34

## 2025-06-30 RX ADMIN — DORZOLAMIDE HYDROCHLORIDE 1 DROP: 20 SOLUTION/ DROPS OPHTHALMIC at 20:30

## 2025-06-30 RX ADMIN — IOPAMIDOL 75 ML: 755 INJECTION, SOLUTION INTRAVENOUS at 15:21

## 2025-06-30 RX ADMIN — LORAZEPAM 1 MG: 1 TABLET ORAL at 14:01

## 2025-06-30 RX ADMIN — LATANOPROST 1 DROP: 50 SOLUTION OPHTHALMIC at 20:30

## 2025-06-30 RX ADMIN — BUDESONIDE 1000 MCG: 0.5 SUSPENSION RESPIRATORY (INHALATION) at 19:34

## 2025-06-30 RX ADMIN — SODIUM CHLORIDE 500 ML: 0.9 INJECTION, SOLUTION INTRAVENOUS at 14:00

## 2025-06-30 RX ADMIN — ARFORMOTEROL TARTRATE 15 MCG: 15 SOLUTION RESPIRATORY (INHALATION) at 19:34

## 2025-06-30 RX ADMIN — SODIUM CHLORIDE, PRESERVATIVE FREE 10 ML: 5 INJECTION INTRAVENOUS at 20:30

## 2025-06-30 RX ADMIN — PROMETHAZINE HYDROCHLORIDE 25 MG: 25 TABLET ORAL at 20:45

## 2025-06-30 RX ADMIN — ENOXAPARIN SODIUM 40 MG: 100 INJECTION SUBCUTANEOUS at 20:30

## 2025-06-30 ASSESSMENT — PAIN SCALES - GENERAL: PAINLEVEL_OUTOF10: 0

## 2025-06-30 NOTE — ED PROVIDER NOTES
[facility] INTERMEDIATE 1  EMERGENCY DEPARTMENT ENCOUNTER        Pt Name: Ruth Jin  MRN: [id number]  Birthdate 1940  Date of evaluation: 6/30/2025  Provider: Eliza Alatorre DO  PCP: Monico Haas MD  Note Started: 11:10 AM EDT 6/30/25    CHIEF COMPLAINT       Chief Complaint   Patient presents with    Shortness of Breath     Increasing for a few wks.       HISTORY OF PRESENT ILLNESS: 1 or more Elements   History From: Patient and daughter    Limitations to history : None    Ruth Jin is a 85 y.o. female who presents with concern for shortness of breath.  Notes she has been treated for different kinds of pneumonia for about the past month with her doxycycline clinic, based on multiple sputum cultures are unsure if anything has actually come back positive or not.  Notes that she does have a history of lung cancer and has had an upper left lobectomy, she has had breast cancer twice and they recently found a pulmonary nodule on her again.  She is on anticoagulation.  She has been having a productive cough for the past month, worsening swelling to her lower extremities, she does feel very anxious, not normally on nasal cannula oxygen.  Notes that she recently had a CT scan done.  Was 88% on room air at rest at nursing facility.  Physical exam otherwise unremarkable.  Has been getting short of breath with minimal exertion.      EXTERNAL NOTE REVIEW:      On chart review last with PCP for dyspnea on exertion on 4/25/2025. Last echo 8/22/2021 with ejection fraction 75 to 80% she did have a CT chest on 6/10/2025 that demonstrated a 1 cm left lower lobe nodular opacity with adjacent satellite nodules possible area of central cavitation with differential to include neoplasm versus infectious/inflammatory etiology    REVIEW OF SYSTEMS :      Positives and Pertinent negatives as per HPI.     SURGICAL HISTORY     Past Surgical History:   Procedure Laterality Date    APPENDECTOMY      BREAST LUMPECTOMY Right  other components within normal limits   COMPREHENSIVE METABOLIC PANEL - Abnormal; Notable for the following components:    Glucose 213 (*)     BUN 7 (*)     Calcium 10.5 (*)     ALT 49 (*)     All other components within normal limits   POCT GLUCOSE - Abnormal; Notable for the following components:    POC Glucose 206 (*)     All other components within normal limits   RESPIRATORY PANEL, MOLECULAR, WITH COVID-19   BRAIN NATRIURETIC PEPTIDE   MAGNESIUM   TROPONIN   BLOOD GAS, ARTERIAL   BASIC METABOLIC PANEL W/ REFLEX TO MG FOR LOW K   CBC WITH AUTO DIFFERENTIAL   PROCALCITONIN   POCT GLUCOSE   POCT GLUCOSE       As interpreted by me, the above displayed labs are abnormal. All other labs obtained during this visit were within normal range or not returned as of this dictation.      RADIOLOGY:   Radiology studies interpreted by the radiologist unless otherwise specified.      CTA PULMONARY W CONTRAST   Final Result   1. No evidence of pulmonary embolism or acute pulmonary abnormality.   2. There is some scarring in the right apex with a couple small nodular   changes largest measuring 3 mm. There are couple tiny nodular densities   posteriorly in the left lower lobe. There is some mild thickening long the   bronchi in the right lower lobe. Some of the bronchi are occluded. This could   be related to bronchitis.           No results found.    No results found.    PROCEDURES   Unless otherwise noted below, none          CRITICAL CARE TIME (.cct)   Please note that the withdrawal or failure to initiate urgent interventions for this patient would likely result in a life threatening deterioration or permanent disability.      Accordingly this patient received 31 minutes of critical care time, excluding separately billable procedures.      PAST MEDICAL HISTORY/Chronic Conditions Affecting Care      has a past medical history of Adenocarcinoma, lung, left (HCC) (3/9/2018), Anesthesia, Breast cancer (HCC), COPD exacerbation

## 2025-06-30 NOTE — PROGRESS NOTES
Database initiated. Patient is A&O comes in from [facility]. She uses a rollator and is RA at baseline but wears a NIV at night. Medications and DNR-CC verified using facility paperwork.

## 2025-06-30 NOTE — PROGRESS NOTES
4 Eyes Skin Assessment     NAME:  Ruth Jin  YOB: 1940  MEDICAL RECORD NUMBER:  [id number]    The patient is being assessed for  Admission    I agree that at least one RN has performed a thorough Head to Toe Skin Assessment on the patient. ALL assessment sites listed below have been assessed.      Areas assessed by both nurses:    Head, Face, Ears, Shoulders, Back, Chest, Arms, Elbows, Hands, Sacrum. Buttock, Coccyx, Ischium, Legs. Feet and Heels, and Under Medical Devices         Does the Patient have a Wound? No noted wound(s)       Sandoval Prevention initiated by RN: No  Wound Care Orders initiated by RN: No    Pressure Injury (Stage 3,4, Unstageable, DTI, NWPT, and Complex wounds) if present, place Wound referral order by RN under : No    New Ostomies, if present place, Ostomy referral order under : No     Nurse 1 eSignature: Electronically signed by Shoaib Carrillo RN on 6/30/25 at 7:02 PM EDT    **SHARE this note so that the co-signing nurse can place an eSignature**    Nurse 2 eSignature: Electronically signed by Dc Brown RN on 6/30/25 at 7:03 PM EDT

## 2025-06-30 NOTE — H&P
[facility]   History and Physical      CHIEF COMPLAINT:  SOB    History of Present Illness:  85 y.o. female with a history of stage IB adenocarcinoma of the lung s/p ESTEFANI lobectomy 2017, COPD/asthma, breast ca, HLD, hypothyroidism, DM type 2 presents with SOB.  Patient has recently been treated outpatient with three courses of antibiotics as well as four courses of steroids. Pt reports she is still having dyspnea with exertion. Had recent echo done outpatient which showed which was reported to be WNL, unable to access echo. Pt reports a productive cough frothy opaque mucus. No CP, nausea,vomiting, diarrhea, constipation, fever, chills.     ED imaging: CTA pulmonary negative for PE, did show scarring in right apex with small nodular changes largest measuring 3 mm, tiny nodular densities posteriorly in left lower lobe, mild thickening along bronchi in right lower lobe.  Pertinent abnormal labs: Blood glucose 213, BUN 7, calcium 10.5, ALT 49.  ED course: Ativan 1 mg, 500 mL NSS bolus.    Informant(s) for H&P: Patient and EMR    REVIEW OF SYSTEMS:  SOB. no fevers, chills, cp,  n/v, ha, vision/hearing changes, wt changes, hot/cold flashes, other open skin lesions, diarrhea, constipation, dysuria/hematuria unless noted in HPI. Complete ROS performed with the patient and is otherwise negative.      PMH:  Past Medical History:   Diagnosis Date    Adenocarcinoma, lung, left (HCC) 3/9/2018    Anesthesia     wakes up very slowly and has lasting drowsiness    Breast cancer (HCC)     lt breast 2012    COPD exacerbation (HCC) 5/24/2023    COPD with acute exacerbation (HCC) 5/1/2023    COPD with exacerbation (HCC) 11/14/2015    General weakness 5/23/2023    Glaucoma     History of colon polyps     History of therapeutic radiation     Hx antineoplastic chemo     Hyperlipidemia     Hypothyroidism     Osteoarthritis     Pneumonia     Pneumonia due to organism 5/23/2023    Type 2 diabetes

## 2025-07-01 ENCOUNTER — APPOINTMENT (OUTPATIENT)
Age: 85
DRG: 189 | End: 2025-07-01
Attending: STUDENT IN AN ORGANIZED HEALTH CARE EDUCATION/TRAINING PROGRAM
Payer: MEDICARE

## 2025-07-01 PROBLEM — E87.20 ACIDOSIS: Status: ACTIVE | Noted: 2025-07-01

## 2025-07-01 PROBLEM — J44.9 CHRONIC OBSTRUCTIVE PULMONARY DISEASE (HCC): Status: ACTIVE | Noted: 2025-07-01

## 2025-07-01 PROBLEM — R06.09 DOE (DYSPNEA ON EXERTION): Status: ACTIVE | Noted: 2025-07-01

## 2025-07-01 LAB
ANION GAP SERPL CALCULATED.3IONS-SCNC: 13 MMOL/L (ref 7–16)
BASOPHILS # BLD: 0.05 K/UL (ref 0–0.2)
BASOPHILS NFR BLD: 1 % (ref 0–2)
BUN SERPL-MCNC: 9 MG/DL (ref 8–23)
CALCIUM SERPL-MCNC: 9.8 MG/DL (ref 8.8–10.2)
CHLORIDE SERPL-SCNC: 105 MMOL/L (ref 98–107)
CO2 SERPL-SCNC: 20 MMOL/L (ref 22–29)
CREAT SERPL-MCNC: 0.5 MG/DL (ref 0.5–1)
EOSINOPHIL # BLD: 0.51 K/UL (ref 0.05–0.5)
EOSINOPHILS RELATIVE PERCENT: 8 % (ref 0–6)
ERYTHROCYTE [DISTWIDTH] IN BLOOD BY AUTOMATED COUNT: 12.1 % (ref 11.5–15)
GFR, ESTIMATED: >90 ML/MIN/1.73M2
GLUCOSE BLD-MCNC: 184 MG/DL (ref 74–99)
GLUCOSE BLD-MCNC: 238 MG/DL (ref 74–99)
GLUCOSE BLD-MCNC: 253 MG/DL (ref 74–99)
GLUCOSE BLD-MCNC: 267 MG/DL (ref 74–99)
GLUCOSE SERPL-MCNC: 224 MG/DL (ref 74–99)
HCT VFR BLD AUTO: 42 % (ref 34–48)
HGB BLD-MCNC: 13.7 G/DL (ref 11.5–15.5)
IMM GRANULOCYTES # BLD AUTO: 0.03 K/UL (ref 0–0.58)
IMM GRANULOCYTES NFR BLD: 1 % (ref 0–5)
LYMPHOCYTES NFR BLD: 0.96 K/UL (ref 1.5–4)
LYMPHOCYTES RELATIVE PERCENT: 16 % (ref 20–42)
MCH RBC QN AUTO: 30.3 PG (ref 26–35)
MCHC RBC AUTO-ENTMCNC: 32.6 G/DL (ref 32–34.5)
MCV RBC AUTO: 92.9 FL (ref 80–99.9)
MONOCYTES NFR BLD: 0.64 K/UL (ref 0.1–0.95)
MONOCYTES NFR BLD: 11 % (ref 2–12)
NEUTROPHILS NFR BLD: 64 % (ref 43–80)
NEUTS SEG NFR BLD: 3.93 K/UL (ref 1.8–7.3)
PLATELET # BLD AUTO: 198 K/UL (ref 130–450)
PMV BLD AUTO: 11.4 FL (ref 7–12)
POTASSIUM SERPL-SCNC: 4.3 MMOL/L (ref 3.5–5.1)
RBC # BLD AUTO: 4.52 M/UL (ref 3.5–5.5)
SODIUM SERPL-SCNC: 138 MMOL/L (ref 136–145)
WBC OTHER # BLD: 6.1 K/UL (ref 4.5–11.5)

## 2025-07-01 PROCEDURE — 97161 PT EVAL LOW COMPLEX 20 MIN: CPT

## 2025-07-01 PROCEDURE — 97530 THERAPEUTIC ACTIVITIES: CPT

## 2025-07-01 PROCEDURE — 80048 BASIC METABOLIC PNL TOTAL CA: CPT

## 2025-07-01 PROCEDURE — 82962 GLUCOSE BLOOD TEST: CPT

## 2025-07-01 PROCEDURE — 97165 OT EVAL LOW COMPLEX 30 MIN: CPT

## 2025-07-01 PROCEDURE — 85025 COMPLETE CBC W/AUTO DIFF WBC: CPT

## 2025-07-01 PROCEDURE — 99232 SBSQ HOSP IP/OBS MODERATE 35: CPT | Performed by: INTERNAL MEDICINE

## 2025-07-01 PROCEDURE — 87449 NOS EACH ORGANISM AG IA: CPT

## 2025-07-01 PROCEDURE — 94640 AIRWAY INHALATION TREATMENT: CPT

## 2025-07-01 PROCEDURE — 2060000000 HC ICU INTERMEDIATE R&B

## 2025-07-01 PROCEDURE — 6370000000 HC RX 637 (ALT 250 FOR IP)

## 2025-07-01 PROCEDURE — 87899 AGENT NOS ASSAY W/OPTIC: CPT

## 2025-07-01 PROCEDURE — 6360000002 HC RX W HCPCS

## 2025-07-01 PROCEDURE — 2700000000 HC OXYGEN THERAPY PER DAY

## 2025-07-01 PROCEDURE — 2500000003 HC RX 250 WO HCPCS

## 2025-07-01 PROCEDURE — 36415 COLL VENOUS BLD VENIPUNCTURE: CPT

## 2025-07-01 RX ORDER — BENZONATATE 100 MG/1
100 CAPSULE ORAL 3 TIMES DAILY PRN
Status: DISCONTINUED | OUTPATIENT
Start: 2025-07-01 | End: 2025-07-03 | Stop reason: HOSPADM

## 2025-07-01 RX ADMIN — DORZOLAMIDE HYDROCHLORIDE 1 DROP: 20 SOLUTION/ DROPS OPHTHALMIC at 08:59

## 2025-07-01 RX ADMIN — INSULIN LISPRO 1 UNITS: 100 INJECTION, SOLUTION INTRAVENOUS; SUBCUTANEOUS at 06:45

## 2025-07-01 RX ADMIN — IPRATROPIUM BROMIDE 0.5 MG: 0.5 SOLUTION RESPIRATORY (INHALATION) at 07:57

## 2025-07-01 RX ADMIN — BUDESONIDE 1000 MCG: 0.5 SUSPENSION RESPIRATORY (INHALATION) at 07:57

## 2025-07-01 RX ADMIN — DORZOLAMIDE HYDROCHLORIDE 1 DROP: 20 SOLUTION/ DROPS OPHTHALMIC at 20:27

## 2025-07-01 RX ADMIN — PANTOPRAZOLE SODIUM 40 MG: 40 TABLET, DELAYED RELEASE ORAL at 06:40

## 2025-07-01 RX ADMIN — ARFORMOTEROL TARTRATE 15 MCG: 15 SOLUTION RESPIRATORY (INHALATION) at 19:45

## 2025-07-01 RX ADMIN — IPRATROPIUM BROMIDE 0.5 MG: 0.5 SOLUTION RESPIRATORY (INHALATION) at 19:44

## 2025-07-01 RX ADMIN — IPRATROPIUM BROMIDE 0.5 MG: 0.5 SOLUTION RESPIRATORY (INHALATION) at 12:19

## 2025-07-01 RX ADMIN — ENOXAPARIN SODIUM 40 MG: 100 INJECTION SUBCUTANEOUS at 20:27

## 2025-07-01 RX ADMIN — BUDESONIDE 1000 MCG: 0.5 SUSPENSION RESPIRATORY (INHALATION) at 19:44

## 2025-07-01 RX ADMIN — INSULIN LISPRO 2 UNITS: 100 INJECTION, SOLUTION INTRAVENOUS; SUBCUTANEOUS at 10:52

## 2025-07-01 RX ADMIN — SODIUM CHLORIDE, PRESERVATIVE FREE 5 ML: 5 INJECTION INTRAVENOUS at 20:27

## 2025-07-01 RX ADMIN — SODIUM CHLORIDE, PRESERVATIVE FREE 10 ML: 5 INJECTION INTRAVENOUS at 08:59

## 2025-07-01 RX ADMIN — LEVOTHYROXINE SODIUM 125 MCG: 0.1 TABLET ORAL at 06:40

## 2025-07-01 RX ADMIN — INSULIN LISPRO 1 UNITS: 100 INJECTION, SOLUTION INTRAVENOUS; SUBCUTANEOUS at 15:36

## 2025-07-01 RX ADMIN — ARFORMOTEROL TARTRATE 15 MCG: 15 SOLUTION RESPIRATORY (INHALATION) at 07:57

## 2025-07-01 RX ADMIN — DORZOLAMIDE HYDROCHLORIDE 1 DROP: 20 SOLUTION/ DROPS OPHTHALMIC at 15:07

## 2025-07-01 RX ADMIN — INSULIN LISPRO 2 UNITS: 100 INJECTION, SOLUTION INTRAVENOUS; SUBCUTANEOUS at 20:30

## 2025-07-01 RX ADMIN — LATANOPROST 1 DROP: 50 SOLUTION OPHTHALMIC at 20:27

## 2025-07-01 RX ADMIN — IPRATROPIUM BROMIDE 0.5 MG: 0.5 SOLUTION RESPIRATORY (INHALATION) at 03:50

## 2025-07-01 ASSESSMENT — PAIN SCALES - GENERAL
PAINLEVEL_OUTOF10: 0

## 2025-07-01 NOTE — PROGRESS NOTES
Occupational Therapy  OCCUPATIONAL THERAPY INITIAL EVALUATION  [facility]  [address], [place]    Date: 2025     Patient Name: Ruth Jin  MRN: [id number]  : 1940  Room: [address] [place] [address]    Evaluating OT: Celi Davila, OTR/L - [id number]    Referring Provider: Emy Ni, LIDIA - CNP  Specific Provider Orders/Date: \"OT eval and treat\" - 2025    Diagnosis: Bronchitis [J40]  Acute hypoxic respiratory failure (HCC) [J96.01]     Pertinent Medical History: COPD, breast cancer, glaucoma, OA, DM     Past Surgical History:   Procedure Laterality Date    APPENDECTOMY      BREAST LUMPECTOMY Right 2012    DCIS    BREAST SURGERY Left     left breast > benign 40 + years ago    BRONCHOSCOPY N/A 2023    BRONCHOSCOPY DIAGNOSTIC OR CELL WASH ONLY performed by Sowmya Haas MD at [facility]    COLONOSCOPY  2013    COLONOSCOPY  2014    EYE SURGERY      HYSTERECTOMY (CERVIX STATUS UNKNOWN)      CARMELITA    OTHER SURGICAL HISTORY  2012    r needle localized axillary sentinel lymph node exision       Precautions: fall risk, bed/chair alarms    Assessment of Current Deficits:    [x] Functional mobility   [x] ADLs  [x] Strength               [x] Cognition   [x] Functional transfers   [x] IADLs         [x] Safety Awareness   [x] Endurance   [] Fine Motor Coordination  [x] Balance      [] Vision/Perception   [x] Sensation    [] Gross Motor Coordination [] ROM          [] Delirium                  [] Motor Control     OT PLAN OF CARE   OT POC is based on physician orders, patient diagnosis, and results of clinical assessment.  Frequency/Duration 2-5 days/week for 2-4 weeks PRN   Specific OT Treatment Interventions to Include:   * Instruction/training on adapted ADL techniques and AE recommendations to increase functional independence within precautions       * Training on energy conservation strategies, correct  transfers  * Therapeutic activities to facilitate/challenge dynamic balance, stand tolerance for increased safety and independence with ADLs  * Neuro-muscular re-education: facilitation of righting/equilibrium reactions, midline orientation, scapular stability/mobility, normalization of muscle tone, and facilitation of volitional active controled movement  * Positioning to improve skin integrity, interaction with environment and functional independence    Recommended Adaptive Equipment: TBD     Home Living: Patient is a resident of an detention ([facility]).  Equipment Owned: rollator, emergency alert button    Prior Level of Function (PLOF): Patient is typically independent with ADLs and light meal prep tasks (toaster, microwave, and mini fridge/freezer available within patient's room). Staff completes most IADLs. Patient was Mod I with functional mobility (with use of rollator).    Pain Level: Patient denied experiencing pain.  Cognition: Patient alert and oriented x3. WFL command follow demonstrated. Patient cooperative, pleasant, and motivated to return to her PLOF and home environment.  Memory: Fair  Sequencing: WFL  Problem Solving: WFL  Judgement/Safety: WFL grossly    Functional Assessment:  AM-PAC Daily Activity Raw Score: 18/24   Initial Eval Status  Date: 7/1/2025 Treatment Status  Date:  Short Term Goals = Long Term Goals   Feeding Setup  Independent   Grooming SBA  Mod I / Independent (seated/standing at sink)   UB Dressing Setup  Mod I (including item retrieval)   LB Dressing Min A  Mod I / Independent - with use of AE, as needed/appropriate   Bathing Min A  Mod I / Independent - with use of AE/DME, as needed/appropriate   Toileting SBA  Mod I / Independent   Bed Mobility  Sit-to-Supine: SBA   Independent in order to maximize patient's independence/participation with ADLs, re-positioning, and other functional tasks.   Functional Transfers Sit-to-Stand: Supervision  from bedside chair. Cues given to  maximize safety.  Independent   Functional Mobility Supervision (with rollator) within patient's room and hallway.  Mod I with functional mobility (with device, as needed/appropriate) in order to maximize independence with ADLs/IADLs and other functional tasks.   Balance Sitting: Good (at EOB)  Standing: Fair+ (with rollator)  Good dynamic standing balance during completion of ADLs/IADLs and other functional tasks.   Activity Tolerance Fair  Patient reported feeling \"winded\" with functional mobility.  Patient will demonstrate Good understanding and consistent implementation of energy conservation techniques and work simplification techniques into ADL/IADL routines.   Visual/  Perceptual WFL grossly  Patient has h/o glaucoma; patient wears glasses, as needed.   N/A   B UE Strength, ROM, and FMC B UE ROM: WFL grossly  B UE Strength: 3+/5 to 4-/5 grossly    FMC/Dexterity: WFL grossly  Patient will demonstrate 5/5 distal B UE strength in order to maximize independence with ADLs/IADLs and functional transfers.     Additional Long-Term Goal: Patient will increase functional independence to PLOF in order to allow patient to live in least restrictive environment.     Hearing: Fair  Sensation: No c/o numbness/tingling in B UEs.  Tone: WFL  Edema: No    Comments: RN approved patient's participation in OOB activities. Upon arrival, patient seated in bedside chair. At end of session, patient supine in bed (per patient preference) with call light and phone within reach, bed alarm activated, and all lines and tubes intact. Patient would benefit from continued skilled OT to increase safety and independence with completion of ADL/IADL tasks for functional independence and quality of life.     Treatment: OT treatment provided this date included:   Instruction/training on safety and adapted techniques for completion of ADLs.    Instruction/training on safe functional mobility/transfer techniques.    Instruction/training on energy

## 2025-07-01 NOTE — CARE COORDINATION
Social Work:    Chart reviewed. Admitted with acute hypoxia,. Hx of COPD, emphysema, stage 1b lung adenocarcinoma.  Follows at [facility]. Social service met with Mrs. Jin (Ruth), advised her about social service role and discussed discharge planning. Ruth is a patient of Dr. Monico Haas and she uses [facility] pharmacy. She resides at [facility] and uses a rollator, nebulizer, and NIV from [place] [facility].  Ruth has been in [facility] snf.  She hopes to return to [facility] but if snf is required, Ruth prefers [facility] snf.  PT presently is 18/24.   Referral sent to [facility] advising of goal to return.  (N-17, ambulette form in chart)    Electronically signed by STACI Cardenas on 7/1/2025 at 12:10 PM

## 2025-07-01 NOTE — PLAN OF CARE
Problem: ABCDS Injury Assessment  Goal: Absence of physical injury  7/1/2025 0940 by Raul Golden RN  Outcome: Progressing     Problem: Discharge Planning  Goal: Discharge to home or other facility with appropriate resources  7/1/2025 0940 by Raul Golden RN  Outcome: Progressing     Problem: Chronic Conditions and Co-morbidities  Goal: Patient's chronic conditions and co-morbidity symptoms are monitored and maintained or improved  7/1/2025 0940 by Raul Golden RN  Outcome: Progressing     Problem: Pain  Goal: Verbalizes/displays adequate comfort level or baseline comfort level  7/1/2025 0940 by Raul Golden RN  Outcome: Progressing     Problem: Safety - Adult  Goal: Free from fall injury  7/1/2025 0940 by Raul Golden RN  Outcome: Progressing

## 2025-07-01 NOTE — CONSULTS
Moris Street M.D.,[facility]  Fausto Almonte D.O., ABRAHAM., [facility]  Deya Mobley M.D.  Sowmya Haas M.D.   Jw Bloom D.O.  Prashant Mccullough M.D.       Patient:  Ruth Jin 85 y.o. female MRN: [id number]           PULMONARY CONSULTATION    Reason for Consultation: Pulmonary nodule on CT, history stage Ib adenocarcinoma of the lung  Referring Physician: Emy Ni    Communication with the referring physician will be sent via the electronic medical record.    Chief Complaint: SOB    CODE STATUS: DNR CC    SUBJECTIVE:  HPI:  Ruth Jin is a 85 y.o. female with PMH stage IB adenocarcinoma of the lung s/p ESTEFANI lobectomy , COPD/asthma, breast ca, HLD, hypothyroidism, DM type 2     Known to our group, followed by Dr. Haas.  Last seen in office 24 for COPD (Trelegy 200 daily, albuterol as needed), moderate persistent asthma, stage Ib adenocarcinoma of the lung s/p lobectomy 2017,  and chronic hypercapnic respiratory failure (on home NIV).  She noted chronic dyspnea during this visit.  Was to follow-up in 1 year as she follows [facility] pulmonology    Epic notes for [facility] pulmonology reviewed.  Brief summary of recent events include:  - 25-exacerbation of COPD treated with azithromycin prednisone burst and DuoNebs-lying 25 CT chest notable for new 1 cm cavitary lesion LLL, history of lung/breast CA, referred to IR pulmonology specialist  -25  (IR pulmonology) from [facility] recommends repeat CT of chest in 3 months, deferring PET for now, E consult completed  -25-sputum specimen negative  -25-advised DuoNebs 3 times daily    Presented to the ER 25 with complaints of SOB.  Patient recently was treated in the outpatient setting with 3 courses of antibiotics as well as 4 courses of steroids.  Family reports Dr. Monico Haas (her PCP) ordered 2 rounds of antibiotics 3 rounds of steroids of the above-mentioned recent regimen.  Unfortunately she still complains of dyspnea on  on file   Intimate Partner Violence: Not on file   Housing Stability: Low Risk  (6/30/2025)    Housing Stability Vital Sign     Unable to Pay for Housing in the Last Year: No     Number of Times Moved in the Last Year: 0     Homeless in the Last Year: No     Smoking history: The patient is a former smoker, quit 2005, 22.5 PYH    ETOH:   reports no history of alcohol use.    Exposures: No exposures    Vaccines:      Immunization History   Administered Date(s) Administered    COVID-19, PFIZER GRAY top, DO NOT Dilute, (age 12 y+), IM, 30 mcg/0.3 mL 05/22/2022    COVID-19, PFIZER PURPLE top, DILUTE for use, (age 12 y+), 30mcg/0.3mL 01/25/2021, 02/15/2021, 09/27/2021    COVID-19, PFIZER, 2024/25, (age 12y+), IM, 30mcg/0.3mL 11/30/2023    Influenza A (M4F2-33) Vaccine PF IM 01/15/2010    Influenza Virus Vaccine 10/03/2014, 10/07/2015, 10/04/2016    Influenza, FLUAD, (age 65 y+), IM, Quadv, 0.5mL 09/29/2020, 10/06/2021, 10/06/2022, 09/28/2023, 09/19/2024    Influenza, FLUAD, (age 65 y+), IM, Trivalent PF, 0.5mL 09/27/2019    Influenza, FLUZONE High Dose, (age 65 y+), IM, Trivalent PF, 0.5mL 10/03/2014, 10/07/2015, 10/04/2016, 09/11/2017, 09/24/2018    Pneumococcal, PCV-13, PREVNAR 13, (age 6w+), IM, 0.5mL 10/07/2015    Pneumococcal, PPSV23, PNEUMOVAX 23, (age 2y+), SC/IM, 0.5mL 02/19/2007, 10/08/2007    RSV, ABRYSVO, (Pregnant or age 60y+), PF, IM, 0.5mL 01/03/2024    TD 2LF, TDVAX, (age 7y+), IM, 0.5mL 02/19/2007    TDaP, ADACEL (age 10y-64y), BOOSTRIX (age 10y+), IM, 0.5mL 11/06/2022    Zoster Recombinant (Shingrix) 02/19/2019, 05/13/2019        Home Meds: Medications Prior to Admission: albuterol (PROVENTIL) (2.5 MG/3ML) 0.083% nebulizer solution, Take 3 mLs by nebulization every 6 hours as needed for Wheezing  bisacodyl (DULCOLAX) 5 MG EC tablet, Take 1 tablet by mouth daily as needed for Constipation  hydrOXYzine HCl (ATARAX) 25 MG tablet, Take 1 tablet by mouth every 6 hours as needed for Itching  metFORMIN  budesonide  1 mg Nebulization BID RT    And    arformoterol tartrate  15 mcg Nebulization BID RT    And    ipratropium  0.5 mg Nebulization Q6H RT    latanoprost  1 drop Right Eye Nightly    levothyroxine  125 mcg Oral Daily    pantoprazole  40 mg Oral QAM AC    rosuvastatin  20 mg Oral Daily       Continuous Infusions:   sodium chloride      dextrose         Allergies   Allergen Reactions    Beta Adrenergic Blockers Shortness Of Breath     Severe wheezing with timolol    Sulfa Antibiotics Hives    Timolol Maleate Shortness Of Breath    Aspirin      bruising    Pcn [Penicillins] Hives       REVIEW OF SYSTEMS:  Constitutional: Denies fever, weight loss, night sweats, and fatigue  Skin: Denies pigmentation, dark lesions, and rashes   HEENT: Denies hearing loss, tinnitus, ear drainage, epistaxis, sore throat, and hoarseness.  Cardiovascular: Denies palpitations, chest pain, and chest pressure.  Respiratory: Denies cough, BENSON, hemoptysis, apnea, and choking.  Gastrointestinal: Denies nausea, vomiting, poor appetite, diarrhea, heartburn or reflux  Genitourinary: Denies dysuria, frequency, urgency or hematuria  Musculoskeletal: Denies myalgias, muscle weakness, and bone pain  Neurological: Denies dizziness, vertigo, headache, and focal weakness  Psychological: Denies anxiety and depression  Endocrine: Denies heat intolerance and cold intolerance  Hematopoietic/Lymphatic: Denies bleeding problems and blood transfusions    OBJECTIVE:   /75   Pulse 93   Temp 98 °F (36.7 °C) (Oral)   Resp 20   Ht 1.575 m (5' 2\")   Wt 78.6 kg (173 lb 4.8 oz)   SpO2 98%   BMI 31.70 kg/m²   SpO2 Readings from Last 1 Encounters:   07/01/25 98%        I/O:    Intake/Output Summary (Last 24 hours) at 7/1/2025 1108  Last data filed at 7/1/2025 1040  Gross per 24 hour   Intake 240 ml   Output 300 ml   Net -60 ml            Physical Exam:  General: The patient is sitting up at bedside comfortably without any distress.  Breathing is not

## 2025-07-01 NOTE — PROGRESS NOTES
Pulse oximetry assessment   98% at rest on room air. 92% while ambulating on room air for 6 minutes.

## 2025-07-01 NOTE — PROGRESS NOTES
[facility] Hospitalist   Progress Note    Admitting Date and Time: 6/30/2025 10:38 AM  Admit Dx: Bronchitis [J40]  Acute hypoxic respiratory failure (HCC) [J96.01]    Subjective:    Patient was admitted with Bronchitis [J40]  Acute hypoxic respiratory failure (HCC) [J96.01]. Patient denies fever, chills, cp, sob, n/v.     sodium chloride flush  5-40 mL IntraVENous 2 times per day    enoxaparin  40 mg SubCUTAneous Daily    insulin lispro  0-4 Units SubCUTAneous 4x Daily AC & HS    dorzolamide  1 drop Both Eyes TID    budesonide  1 mg Nebulization BID RT    And    arformoterol tartrate  15 mcg Nebulization BID RT    And    ipratropium  0.5 mg Nebulization Q6H RT    latanoprost  1 drop Right Eye Nightly    levothyroxine  125 mcg Oral Daily    pantoprazole  40 mg Oral QAM AC    rosuvastatin  20 mg Oral Daily     sodium chloride flush, 5-40 mL, PRN  sodium chloride, , PRN  potassium chloride, 40 mEq, PRN   Or  potassium alternative oral replacement, 40 mEq, PRN   Or  potassium chloride, 10 mEq, PRN  magnesium sulfate, 2,000 mg, PRN  polyethylene glycol, 17 g, Daily PRN  acetaminophen, 650 mg, Q6H PRN   Or  acetaminophen, 650 mg, Q6H PRN  promethazine, 25 mg, Q6H PRN  glucose, 4 tablet, PRN  dextrose bolus, 125 mL, PRN   Or  dextrose bolus, 250 mL, PRN  glucagon (rDNA), 1 mg, PRN  dextrose, , Continuous PRN  albuterol, 2.5 mg, Q6H PRN  bisacodyl, 5 mg, Daily PRN  cetirizine, 10 mg, Daily PRN  hydrOXYzine pamoate, 25 mg, Q6H PRN  fluticasone, 2 spray, Daily PRN  hydrALAZINE, 10 mg, Q6H PRN         Objective:    /71   Pulse 97   Temp 98.1 °F (36.7 °C) (Oral)   Resp 18   Ht 1.575 m (5' 2\")   Wt 78.6 kg (173 lb 4.8 oz)   SpO2 94%   BMI 31.70 kg/m²   Skin: warm and dry, no rash or erythema  Pulmonary/Chest: clear to auscultation bilaterally- no wheezes, rales or rhonchi, normal air movement, no respiratory distress  Cardiovascular: rhythm reg at rate of 96  Abdomen: soft, non-tender,

## 2025-07-01 NOTE — PROGRESS NOTES
Physical Therapy  Facility/Department: [address] INTERMEDIATE 1  Physical Therapy Initial Assessment    Name: Ruth Jin  : 1940  MRN: [id number]  Date of Service: 2025        Patient Diagnosis(es): The primary encounter diagnosis was Acute hypoxic respiratory failure (HCC). A diagnosis of Bronchitis was also pertinent to this visit.  Past Medical History:  has a past medical history of Adenocarcinoma, lung, left (HCC), Anesthesia, Breast cancer (HCC), COPD exacerbation (HCC), COPD with acute exacerbation (HCC), COPD with exacerbation (HCC), General weakness, Glaucoma, History of colon polyps, History of therapeutic radiation, Hx antineoplastic chemo, Hyperlipidemia, Hypothyroidism, Osteoarthritis, Pneumonia, Pneumonia due to organism, and Type 2 diabetes mellitus.  Past Surgical History:  has a past surgical history that includes Hysterectomy; other surgical history (2012); Appendectomy; Colonoscopy (2013); Colonoscopy (2014); eye surgery; Breast surgery (Left); Breast lumpectomy (Right, 2012); and bronchoscopy (N/A, 2023).      Evaluating Therapist: Kimberly Villagomez PT    Room #:  0443/0443-A  Diagnosis:  Bronchitis [J40]  Acute hypoxic respiratory failure (HCC) [J96.01]  PMHx/PSHx:  COPD, Glaucoma, DM  Precautions:  falls, alarm      Social:  Pt admitted from [facility]. Ambulates with rollator   Initial Evaluation  Date: 25 Treatment      Short Term/ Long Term   Goals   Was pt agreeable to Eval/treatment? yes     Does pt have pain? No c/o pain     Bed Mobility  Rolling: NT  Supine to sit: NT  Sit to supine: NT  Scooting: NT  independent   Transfers Sit to stand: supervision  Stand to sit: supervision  Stand pivot: supervision  independent   Ambulation    120 feet x2 with rollator with supervision  150 feet with rollator independent   Stair Negotiation  Ascended and descended  NT   N/A   LE strength     3+/5    4/5   balance      Fair+     AM-PAC Raw score

## 2025-07-01 NOTE — ACP (ADVANCE CARE PLANNING)
Advance Care Planning   Healthcare Decision Maker:    Primary Decision Maker: Crsitine Jaime - Child - [phone]    Secondary Decision Maker: Stephani Callejas - Child - [phone]    Click here to complete Healthcare Decision Makers including selection of the Healthcare Decision Maker Relationship (ie \"Primary\").

## 2025-07-02 LAB
ANION GAP SERPL CALCULATED.3IONS-SCNC: 14 MMOL/L (ref 7–16)
BASOPHILS # BLD: 0.07 K/UL (ref 0–0.2)
BASOPHILS NFR BLD: 1 % (ref 0–2)
BUN SERPL-MCNC: 10 MG/DL (ref 8–23)
CALCIUM SERPL-MCNC: 9.7 MG/DL (ref 8.8–10.2)
CHLORIDE SERPL-SCNC: 104 MMOL/L (ref 98–107)
CO2 SERPL-SCNC: 19 MMOL/L (ref 22–29)
CREAT SERPL-MCNC: 0.6 MG/DL (ref 0.5–1)
EOSINOPHIL # BLD: 0.62 K/UL (ref 0.05–0.5)
EOSINOPHILS RELATIVE PERCENT: 10 % (ref 0–6)
ERYTHROCYTE [DISTWIDTH] IN BLOOD BY AUTOMATED COUNT: 12.2 % (ref 11.5–15)
GFR, ESTIMATED: 87 ML/MIN/1.73M2
GLUCOSE BLD-MCNC: 162 MG/DL (ref 74–99)
GLUCOSE BLD-MCNC: 185 MG/DL (ref 74–99)
GLUCOSE BLD-MCNC: 262 MG/DL (ref 74–99)
GLUCOSE BLD-MCNC: 303 MG/DL (ref 74–99)
GLUCOSE SERPL-MCNC: 219 MG/DL (ref 74–99)
HCT VFR BLD AUTO: 42.2 % (ref 34–48)
HGB BLD-MCNC: 13.7 G/DL (ref 11.5–15.5)
IMM GRANULOCYTES # BLD AUTO: 0.03 K/UL (ref 0–0.58)
IMM GRANULOCYTES NFR BLD: 1 % (ref 0–5)
L PNEUMO1 AG UR QL IA.RAPID: NEGATIVE
LYMPHOCYTES NFR BLD: 1.21 K/UL (ref 1.5–4)
LYMPHOCYTES RELATIVE PERCENT: 20 % (ref 20–42)
MCH RBC QN AUTO: 30.8 PG (ref 26–35)
MCHC RBC AUTO-ENTMCNC: 32.5 G/DL (ref 32–34.5)
MCV RBC AUTO: 94.8 FL (ref 80–99.9)
MONOCYTES NFR BLD: 0.65 K/UL (ref 0.1–0.95)
MONOCYTES NFR BLD: 11 % (ref 2–12)
NEUTROPHILS NFR BLD: 57 % (ref 43–80)
NEUTS SEG NFR BLD: 3.45 K/UL (ref 1.8–7.3)
PLATELET # BLD AUTO: 190 K/UL (ref 130–450)
PMV BLD AUTO: 12.2 FL (ref 7–12)
POTASSIUM SERPL-SCNC: 4.6 MMOL/L (ref 3.5–5.1)
RBC # BLD AUTO: 4.45 M/UL (ref 3.5–5.5)
S PNEUM AG SPEC QL: NEGATIVE
SODIUM SERPL-SCNC: 137 MMOL/L (ref 136–145)
SPECIMEN SOURCE: NORMAL
WBC OTHER # BLD: 6 K/UL (ref 4.5–11.5)

## 2025-07-02 PROCEDURE — 6370000000 HC RX 637 (ALT 250 FOR IP): Performed by: INTERNAL MEDICINE

## 2025-07-02 PROCEDURE — 6370000000 HC RX 637 (ALT 250 FOR IP)

## 2025-07-02 PROCEDURE — 94640 AIRWAY INHALATION TREATMENT: CPT

## 2025-07-02 PROCEDURE — 85025 COMPLETE CBC W/AUTO DIFF WBC: CPT

## 2025-07-02 PROCEDURE — 6360000002 HC RX W HCPCS

## 2025-07-02 PROCEDURE — 82962 GLUCOSE BLOOD TEST: CPT

## 2025-07-02 PROCEDURE — 80048 BASIC METABOLIC PNL TOTAL CA: CPT

## 2025-07-02 PROCEDURE — 2060000000 HC ICU INTERMEDIATE R&B

## 2025-07-02 PROCEDURE — 36415 COLL VENOUS BLD VENIPUNCTURE: CPT

## 2025-07-02 PROCEDURE — 2500000003 HC RX 250 WO HCPCS

## 2025-07-02 PROCEDURE — 99232 SBSQ HOSP IP/OBS MODERATE 35: CPT | Performed by: INTERNAL MEDICINE

## 2025-07-02 RX ORDER — DEXTROSE MONOHYDRATE 100 MG/ML
INJECTION, SOLUTION INTRAVENOUS CONTINUOUS PRN
Status: DISCONTINUED | OUTPATIENT
Start: 2025-07-02 | End: 2025-07-03 | Stop reason: HOSPADM

## 2025-07-02 RX ORDER — GLUCAGON 1 MG/ML
1 KIT INJECTION PRN
Status: DISCONTINUED | OUTPATIENT
Start: 2025-07-02 | End: 2025-07-03 | Stop reason: HOSPADM

## 2025-07-02 RX ORDER — INSULIN LISPRO 100 [IU]/ML
0-4 INJECTION, SOLUTION INTRAVENOUS; SUBCUTANEOUS
Status: DISCONTINUED | OUTPATIENT
Start: 2025-07-02 | End: 2025-07-03 | Stop reason: HOSPADM

## 2025-07-02 RX ADMIN — IPRATROPIUM BROMIDE 0.5 MG: 0.5 SOLUTION RESPIRATORY (INHALATION) at 01:50

## 2025-07-02 RX ADMIN — INSULIN LISPRO 2 UNITS: 100 INJECTION, SOLUTION INTRAVENOUS; SUBCUTANEOUS at 16:33

## 2025-07-02 RX ADMIN — ARFORMOTEROL TARTRATE 15 MCG: 15 SOLUTION RESPIRATORY (INHALATION) at 07:39

## 2025-07-02 RX ADMIN — INSULIN LISPRO 3 UNITS: 100 INJECTION, SOLUTION INTRAVENOUS; SUBCUTANEOUS at 11:05

## 2025-07-02 RX ADMIN — IPRATROPIUM BROMIDE 0.5 MG: 0.5 SOLUTION RESPIRATORY (INHALATION) at 07:39

## 2025-07-02 RX ADMIN — ENOXAPARIN SODIUM 40 MG: 100 INJECTION SUBCUTANEOUS at 20:10

## 2025-07-02 RX ADMIN — INSULIN LISPRO 1 UNITS: 100 INJECTION, SOLUTION INTRAVENOUS; SUBCUTANEOUS at 20:10

## 2025-07-02 RX ADMIN — ARFORMOTEROL TARTRATE 15 MCG: 15 SOLUTION RESPIRATORY (INHALATION) at 19:59

## 2025-07-02 RX ADMIN — DORZOLAMIDE HYDROCHLORIDE 1 DROP: 20 SOLUTION/ DROPS OPHTHALMIC at 09:05

## 2025-07-02 RX ADMIN — BENZONATATE 100 MG: 100 CAPSULE ORAL at 09:03

## 2025-07-02 RX ADMIN — IPRATROPIUM BROMIDE 0.5 MG: 0.5 SOLUTION RESPIRATORY (INHALATION) at 12:08

## 2025-07-02 RX ADMIN — SODIUM CHLORIDE, PRESERVATIVE FREE 10 ML: 5 INJECTION INTRAVENOUS at 20:11

## 2025-07-02 RX ADMIN — FLUTICASONE PROPIONATE 2 SPRAY: 50 SPRAY, METERED NASAL at 10:48

## 2025-07-02 RX ADMIN — LEVOTHYROXINE SODIUM 125 MCG: 0.1 TABLET ORAL at 05:43

## 2025-07-02 RX ADMIN — DORZOLAMIDE HYDROCHLORIDE 1 DROP: 20 SOLUTION/ DROPS OPHTHALMIC at 20:11

## 2025-07-02 RX ADMIN — BUDESONIDE 1000 MCG: 0.5 SUSPENSION RESPIRATORY (INHALATION) at 19:59

## 2025-07-02 RX ADMIN — IPRATROPIUM BROMIDE 0.5 MG: 0.5 SOLUTION RESPIRATORY (INHALATION) at 19:59

## 2025-07-02 RX ADMIN — PANTOPRAZOLE SODIUM 40 MG: 40 TABLET, DELAYED RELEASE ORAL at 05:43

## 2025-07-02 RX ADMIN — BUDESONIDE 1000 MCG: 0.5 SUSPENSION RESPIRATORY (INHALATION) at 07:39

## 2025-07-02 RX ADMIN — SODIUM CHLORIDE, PRESERVATIVE FREE 10 ML: 5 INJECTION INTRAVENOUS at 09:04

## 2025-07-02 RX ADMIN — CETIRIZINE HYDROCHLORIDE 10 MG: 10 TABLET, FILM COATED ORAL at 09:03

## 2025-07-02 RX ADMIN — LATANOPROST 1 DROP: 50 SOLUTION OPHTHALMIC at 20:11

## 2025-07-02 NOTE — CONSULTS
Inpatient Cardiology Consultation      Reason for Consult:  BENSON    Consulting Physician: Dr. Rooney    Requesting Physician:  Dr. Degroot    Date of Consultation: 7/3/2025    HISTORY OF PRESENT ILLNESS: 85 y.o.  female, not known to [facility], follows with [facility] cardiology.     6/30/2025: Presented to the ED for evaluation of shortness of breath, after being treated for pneumonia for the past month with doxycycline, with productive cough, and bilateral lower extremity swelling.  Reportedly hypoxic at nursing facility, with SpO2 at 88%, and is not normally on supplemental O2.    Patient sitting up in chair at side of bed, alert and oriented x 3, no obvious signs of distress, her daughter was at bedside the entire time during assessment interview.  They report that patient has had ongoing shortness of breath with exertion and cough, ongoing for couple of months, was thought to be URI, treated with antibiotics and steroids, with no significant improvements.  With any kind of exertion, she gets extremely short of breath, has to sit and wait, and occasionally feels like she is going to pass out.  They also note that she occasionally feels some mild tightness across her chest, and intermittent abdominal discomfort, which seems to be brought on by cough.  She tells me that she has been coughing up \"a lot of stuff\", yellow appearing sputum, and is very \"foamy\".  She also reports left shoulder pain, that has been ongoing for several months now as well.  Presented to the ED for further evaluation.  Upon presentation, patient's daughter reports that her \"BP was really high in the ER\".  They deny any other associated symptoms, including dizziness, lightheadedness, palpitations, neck pain, jaw pain, arm pain, numbness, tingling, vomiting, fever, or chills.  Denies any recent dietary changes, or recent travels, foreign or domestic.    The only medication changes they report, patient was started on  Nostril route daily   Yes Provider, MD Tania   latanoprost (XALATAN) 0.005 % ophthalmic solution Place 1 drop into the right eye nightly   Yes Provider, MD Tania   Handicap Placard MISC by Does not apply route 11/6/23   Karlee Aguiar MD     Current Medications:    Current Facility-Administered Medications: dextrose bolus 10% 125 mL, 125 mL, IntraVENous, PRN **OR** dextrose bolus 10% 250 mL, 250 mL, IntraVENous, PRN  glucagon injection 1 mg, 1 mg, SubCUTAneous, PRN  dextrose 10 % infusion, , IntraVENous, Continuous PRN  insulin lispro (HUMALOG,ADMELOG) injection vial 0-4 Units, 0-4 Units, SubCUTAneous, 4x Daily AC & HS  benzonatate (TESSALON) capsule 100 mg, 100 mg, Oral, TID PRN  sodium chloride flush 0.9 % injection 5-40 mL, 5-40 mL, IntraVENous, 2 times per day  sodium chloride flush 0.9 % injection 5-40 mL, 5-40 mL, IntraVENous, PRN  0.9 % sodium chloride infusion, , IntraVENous, PRN  enoxaparin (LOVENOX) injection 40 mg, 40 mg, SubCUTAneous, Daily  polyethylene glycol (GLYCOLAX) packet 17 g, 17 g, Oral, Daily PRN  acetaminophen (TYLENOL) tablet 650 mg, 650 mg, Oral, Q6H PRN **OR** acetaminophen (TYLENOL) suppository 650 mg, 650 mg, Rectal, Q6H PRN  promethazine (PHENERGAN) tablet 25 mg, 25 mg, Oral, Q6H PRN  glucose chewable tablet 16 g, 4 tablet, Oral, PRN  albuterol (PROVENTIL) (2.5 MG/3ML) 0.083% nebulizer solution 2.5 mg, 2.5 mg, Nebulization, Q6H PRN  bisacodyl (DULCOLAX) EC tablet 5 mg, 5 mg, Oral, Daily PRN  cetirizine (ZYRTEC) tablet 10 mg, 10 mg, Oral, Daily PRN  dorzolamide (TRUSOPT) 2 % ophthalmic solution 1 drop, 1 drop, Both Eyes, TID  budesonide (PULMICORT) nebulizer suspension 1,000 mcg, 1 mg, Nebulization, BID RT **AND** arformoterol tartrate (BROVANA) nebulizer solution 15 mcg, 15 mcg, Nebulization, BID RT **AND** ipratropium (ATROVENT) 0.02 % nebulizer solution 0.5 mg, 0.5 mg, Nebulization, Q6H RT  hydrOXYzine pamoate (VISTARIL) capsule 25 mg, 25 mg, Oral, Q6H PRN  latanoprost  (XALATAN) 0.005 % ophthalmic solution 1 drop, 1 drop, Right Eye, Nightly  levothyroxine (SYNTHROID) tablet 125 mcg, 125 mcg, Oral, Daily  pantoprazole (PROTONIX) tablet 40 mg, 40 mg, Oral, QAM AC  rosuvastatin (CRESTOR) tablet 20 mg, 20 mg, Oral, Daily  fluticasone (FLONASE) 50 MCG/ACT nasal spray 2 spray, 2 spray, Each Nostril, Daily PRN  hydrALAZINE (APRESOLINE) injection 10 mg, 10 mg, IntraVENous, Q6H PRN    Allergies:  Aspirin - bruising, Beta adrenergic blockers - shortness of breath, severe wheezing with timolol, Sulfa antibiotics - hives, Timolol maleate - shortness of breath and wheezing, and Pcn [penicillins] - hives    Social History:    Tobacco: Former 22.5-pack-year smoker - quit 2005  ETOH: Denies  Illicit drugs: Denies  Activity: She does use a rollator to help with ambulation.  She lives at [facility].    Code Status: DNR CC    Family History: Non contributory DT age    Family History   Problem Relation Age of Onset    Pancreatic Cancer Sister     Breast Cancer Sister 77    Colon Cancer Sister 67    Prostate Cancer Brother 79    Pancreatic Cancer Brother 55     REVIEW OF SYSTEMS:   See HPI    PHYSICAL EXAM:   BP (!) 148/82   Pulse 84   Temp 98 °F (36.7 °C) (Oral)   Resp 18   Ht 1.575 m (5' 2\")   Wt 80.5 kg (177 lb 8 oz)   SpO2 95%   BMI 32.47 kg/m²     CONST:  Well developed, well nourished, obese, cessation female, who appears of stated age. Awake, alert and cooperative. No apparent distress.   HEENT:   Head- Normocephalic, atraumatic   Eyes- Conjunctivae pink, anicteric  Throat- Oral mucosa pink and moist  Neck-  No stridor, trachea midline, no jugular venous distention. No carotid bruit.    CHEST: Chest symmetrical and non-tender to palpation. No accessory muscle use or intercostal retractions  RESPIRATORY: Lung sounds -diminished throughout fields, lower >upper, with fine crackles to bilateral lower lobes.  CARDIOVASCULAR:     Heart Ausculation- Regular rate and rhythm, no

## 2025-07-02 NOTE — PROGRESS NOTES
Called the floor and spoke with rn to notify her of recent echo at [facility] to call and get report,will hold echo for now rn aware N Omar RCS

## 2025-07-02 NOTE — CARE COORDINATION
Social Work/Discharge Planning:  Met with patient and confirmed plan to return to [facility] at discharge.  She denies any need for home health care.  Will continue to follow.  Electronically signed by STACI Schaffer on 7/2/2025 at 2:09 PM

## 2025-07-02 NOTE — PROGRESS NOTES
Moris Street M.D.,[facility]  Fausto Almonte D.O., F.A.C.O.I., [facility]  Bryan Mobley M.D.  Sowmya Haas M.D.   Jw Bloom D.O.  Prashant Mccullough M.D.         Daily Pulmonary Progress Note    Patient:  Ruth Jin 85 y.o. female MRN: [id number]            Synopsis     We are following patient for pulmonary nodule on CT, history stage Ib adenocarcinoma of the lung    \"CC\" SOB    Code status: DNR CC      Subjective      7/3/25:   Patient was seen and examined.  Appears 2D echo has been placed on hold.  Received records from [facility], see below.  PCP consulted cardiology, appreciated calcium on imaging studies, planning for stress test today, consideration for heart cath moving forward.  Patient reports cardiology thought symptoms were cardiac mediated.  Reports coughing up some light yellow phlegm at intervals, also with some sinus congestion.      Review of Systems:  Constitutional: Denies fever, weight loss, night sweats, and fatigue  Skin: Denies pigmentation, dark lesions, and rashes   HEENT: Denies hearing loss, tinnitus, ear drainage, epistaxis, sore throat, and hoarseness.  Cardiovascular: Denies palpitations, chest pain, and chest pressure.  Respiratory: Denies cough, BENSON, hemoptysis, apnea, and choking.  Gastrointestinal: Denies nausea, vomiting, poor appetite, diarrhea, heartburn or reflux  Genitourinary: Denies dysuria, frequency, urgency or hematuria  Musculoskeletal: Denies myalgias, muscle weakness, and bone pain  Neurological: Denies dizziness, vertigo, headache, and focal weakness  Psychological: Denies anxiety and depression  Endocrine: Denies heat intolerance and cold intolerance  Hematopoietic/Lymphatic: Denies bleeding problems and blood transfusions    24-hour events:  As above    Objective   OBJECTIVE:   BP (!) 148/82   Pulse 84   Temp 98 °F (36.7 °C) (Oral)   Resp 18   Ht 1.575 m (5' 2\")   Wt 80.5 kg (177 lb 8 oz)   SpO2 95%   BMI 32.47 kg/m²   SpO2 Readings from Last 1   07/03/2025 04:36 AM    MPV 11.2 07/03/2025 04:36 AM     Lab Results   Component Value Date/Time     07/03/2025 04:36 AM    K 4.1 07/03/2025 04:36 AM    K 4.5 05/26/2023 04:15 AM     07/03/2025 04:36 AM    CO2 21 07/03/2025 04:36 AM    BUN 10 07/03/2025 04:36 AM    CREATININE 0.5 07/03/2025 04:36 AM    CALCIUM 9.6 07/03/2025 04:36 AM    GFRAA >60 10/03/2022 08:51 AM    LABGLOM 89 07/03/2025 04:36 AM    LABGLOM 85 04/10/2024 02:33 PM     Lab Results   Component Value Date/Time    PROTIME 12.7 05/23/2023 03:35 PM    INR 1.1 05/23/2023 03:35 PM     No results for input(s): \"PROBNP\" in the last 72 hours.    No results for input(s): \"TROPONINI\" in the last 72 hours.  No results for input(s): \"PROCAL\" in the last 72 hours.    This SmartLink has not been configured with any valid records.       Micro:  No results for input(s): \"CULTRESP\" in the last 72 hours.  No results for input(s): \"LABGRAM\" in the last 72 hours.  No results for input(s): \"LEGUR\" in the last 72 hours.  No results for input(s): \"STREPNEUMAGU\" in the last 72 hours.  No results for input(s): \"LP1UAG\" in the last 72 hours.     Assessment:    Acute hypoxia on chronic hypercapnic respiratory failure on NIV at home  COPD without exacerbation   Emphysema  Moderate persistent asthma  Right apical scarring  BENSON felt primarily from deconditioning  Sclerotic mitral valve with calcification/trace-regurg, left ventricular hypertrophy with mitral calcification  History of stage Ib adenocarcinoma of the lung, follows [facility] pulmonology services  History of breast CA, s/p chemo and radiation  History tobacco use      Plan:   On RA, supplemental oxygen to maintain SpO2 > 92%  Continue Brovana and Pulmicort nebs twice daily, ipratropium every 6 hours.  Resume Trelegy 200 daily and albuterol as needed at discharge  Albuterol nebs as needed, Zyrtec as needed, Flonase as needed  Recently treated in outpatient with 3 rounds of antibiotics and 4 rounds of  steroids, no improvements in BENSON  2D echo 5/21/25 completed at [facility], records received, see above  Pro-Robin 0.06, strep pneumo and Legionella antigens negative  Reviewed CT chest 6/30/25, emphysematous changes appreciated, LLL cavitary lesion, will be following up with [facility] in 3 months for serial imaging as recommended per interventional radiology pulmonology   Continue home NIV  DVT/GI prophy  PT/OT   Recommend pulmonary rehab in outpatient setting  Follow-up in office in 6-8 weeks, office already notified  Pulmonary will follow as needed moving forward      This plan of care was reviewed in collaboration with Dr. Mccullough    Electronically signed by LIDIA Watson on 7/3/2025 at 12:22 PM        This is confirmation that I have personally performed a substantial portion of medical decision making (>50%) related to this patient encounter.  The medications & laboratory data and imagery were discussed and adjusted where necessary. Key issues of the case were discussed among consultants.  Review of CNP documentation was conducted and revisions were made as appropriate. I agree with the above documented exam, problem list and plan of care with the following additions:    No obvious pulmonary reason for shortness of breath aside from potential deconditioning. Cardiology checking stress test. Okay for discharge from a pulmonary standpoint once okay with all others    Prashant Mccullough MD

## 2025-07-02 NOTE — PROGRESS NOTES
[facility] Hospitalist   Progress Note    Admitting Date and Time: 6/30/2025 10:38 AM  Admit Dx: Bronchitis [J40]  Acute hypoxic respiratory failure (HCC) [J96.01]    Subjective:    Patient was admitted with Bronchitis [J40]  Acute hypoxic respiratory failure (HCC) [J96.01]. Patient denies fever, chills, cp, sob, n/v.     sodium chloride flush  5-40 mL IntraVENous 2 times per day    enoxaparin  40 mg SubCUTAneous Daily    insulin lispro  0-4 Units SubCUTAneous 4x Daily AC & HS    dorzolamide  1 drop Both Eyes TID    budesonide  1 mg Nebulization BID RT    And    arformoterol tartrate  15 mcg Nebulization BID RT    And    ipratropium  0.5 mg Nebulization Q6H RT    latanoprost  1 drop Right Eye Nightly    levothyroxine  125 mcg Oral Daily    pantoprazole  40 mg Oral QAM AC    rosuvastatin  20 mg Oral Daily     benzonatate, 100 mg, TID PRN  sodium chloride flush, 5-40 mL, PRN  sodium chloride, , PRN  potassium chloride, 40 mEq, PRN   Or  potassium alternative oral replacement, 40 mEq, PRN   Or  potassium chloride, 10 mEq, PRN  magnesium sulfate, 2,000 mg, PRN  polyethylene glycol, 17 g, Daily PRN  acetaminophen, 650 mg, Q6H PRN   Or  acetaminophen, 650 mg, Q6H PRN  promethazine, 25 mg, Q6H PRN  glucose, 4 tablet, PRN  dextrose bolus, 125 mL, PRN   Or  dextrose bolus, 250 mL, PRN  glucagon (rDNA), 1 mg, PRN  dextrose, , Continuous PRN  albuterol, 2.5 mg, Q6H PRN  bisacodyl, 5 mg, Daily PRN  cetirizine, 10 mg, Daily PRN  hydrOXYzine pamoate, 25 mg, Q6H PRN  fluticasone, 2 spray, Daily PRN  hydrALAZINE, 10 mg, Q6H PRN         Objective:    /83   Pulse 95   Temp 97.9 °F (36.6 °C) (Oral)   Resp 18   Ht 1.575 m (5' 2\")   Wt 80.5 kg (177 lb 8 oz)   SpO2 97%   BMI 32.47 kg/m²   Skin: warm and dry, no rash or erythema  Pulmonary/Chest: clear to auscultation bilaterally- no wheezes, rales or rhonchi, normal air movement, no respiratory distress  Cardiovascular: rhythm reg at rate of 96  Abdomen:

## 2025-07-03 ENCOUNTER — APPOINTMENT (OUTPATIENT)
Age: 85
DRG: 189 | End: 2025-07-03
Payer: MEDICARE

## 2025-07-03 ENCOUNTER — APPOINTMENT (OUTPATIENT)
Dept: NUCLEAR MEDICINE | Age: 85
DRG: 189 | End: 2025-07-03
Payer: MEDICARE

## 2025-07-03 VITALS
RESPIRATION RATE: 18 BRPM | WEIGHT: 177.5 LBS | OXYGEN SATURATION: 97 % | TEMPERATURE: 97.6 F | SYSTOLIC BLOOD PRESSURE: 140 MMHG | HEART RATE: 92 BPM | HEIGHT: 62 IN | BODY MASS INDEX: 32.66 KG/M2 | DIASTOLIC BLOOD PRESSURE: 72 MMHG

## 2025-07-03 PROBLEM — I25.10 CORONARY ARTERY CALCIFICATION: Status: ACTIVE | Noted: 2025-07-03

## 2025-07-03 LAB
ANION GAP SERPL CALCULATED.3IONS-SCNC: 13 MMOL/L (ref 7–16)
BASOPHILS # BLD: 0.07 K/UL (ref 0–0.2)
BASOPHILS NFR BLD: 1 % (ref 0–2)
BUN SERPL-MCNC: 10 MG/DL (ref 8–23)
CALCIUM SERPL-MCNC: 9.6 MG/DL (ref 8.8–10.2)
CHLORIDE SERPL-SCNC: 105 MMOL/L (ref 98–107)
CO2 SERPL-SCNC: 21 MMOL/L (ref 22–29)
CREAT SERPL-MCNC: 0.5 MG/DL (ref 0.5–1)
ECHO BSA: 1.86 M2
EOSINOPHIL # BLD: 0.59 K/UL (ref 0.05–0.5)
EOSINOPHILS RELATIVE PERCENT: 10 % (ref 0–6)
ERYTHROCYTE [DISTWIDTH] IN BLOOD BY AUTOMATED COUNT: 12.3 % (ref 11.5–15)
GFR, ESTIMATED: 89 ML/MIN/1.73M2
GLUCOSE BLD-MCNC: 162 MG/DL (ref 74–99)
GLUCOSE BLD-MCNC: 187 MG/DL (ref 74–99)
GLUCOSE BLD-MCNC: 211 MG/DL (ref 74–99)
GLUCOSE SERPL-MCNC: 170 MG/DL (ref 74–99)
HBA1C MFR BLD: 9.4 % (ref 4–5.6)
HCT VFR BLD AUTO: 39.6 % (ref 34–48)
HGB BLD-MCNC: 13.4 G/DL (ref 11.5–15.5)
IMM GRANULOCYTES # BLD AUTO: 0.04 K/UL (ref 0–0.58)
IMM GRANULOCYTES NFR BLD: 1 % (ref 0–5)
LYMPHOCYTES NFR BLD: 1.54 K/UL (ref 1.5–4)
LYMPHOCYTES RELATIVE PERCENT: 26 % (ref 20–42)
MCH RBC QN AUTO: 31 PG (ref 26–35)
MCHC RBC AUTO-ENTMCNC: 33.8 G/DL (ref 32–34.5)
MCV RBC AUTO: 91.7 FL (ref 80–99.9)
MONOCYTES NFR BLD: 0.61 K/UL (ref 0.1–0.95)
MONOCYTES NFR BLD: 10 % (ref 2–12)
NEUTROPHILS NFR BLD: 52 % (ref 43–80)
NEUTS SEG NFR BLD: 3.14 K/UL (ref 1.8–7.3)
NUC REST EJECTION FRACTION: 71 %
PLATELET # BLD AUTO: 212 K/UL (ref 130–450)
PMV BLD AUTO: 11.2 FL (ref 7–12)
POTASSIUM SERPL-SCNC: 4.1 MMOL/L (ref 3.5–5.1)
RBC # BLD AUTO: 4.32 M/UL (ref 3.5–5.5)
SODIUM SERPL-SCNC: 138 MMOL/L (ref 136–145)
STRESS BASELINE DIAS BP: 90 MMHG
STRESS BASELINE HR: 82 BPM
STRESS BASELINE SYS BP: 177 MMHG
STRESS ESTIMATED WORKLOAD: 1 METS
STRESS PEAK DIAS BP: 79 MMHG
STRESS PEAK SYS BP: 179 MMHG
STRESS PERCENT HR ACHIEVED: 84 %
STRESS POST PEAK HR: 112 BPM
STRESS RATE PRESSURE PRODUCT: NORMAL BPM*MMHG
STRESS TARGET HR: 133 BPM
TID: 0.96
WBC OTHER # BLD: 6 K/UL (ref 4.5–11.5)

## 2025-07-03 PROCEDURE — 99239 HOSP IP/OBS DSCHRG MGMT >30: CPT | Performed by: INTERNAL MEDICINE

## 2025-07-03 PROCEDURE — 93016 CV STRESS TEST SUPVJ ONLY: CPT | Performed by: INTERNAL MEDICINE

## 2025-07-03 PROCEDURE — 3430000000 HC RX DIAGNOSTIC RADIOPHARMACEUTICAL: Performed by: RADIOLOGY

## 2025-07-03 PROCEDURE — 80048 BASIC METABOLIC PNL TOTAL CA: CPT

## 2025-07-03 PROCEDURE — 99223 1ST HOSP IP/OBS HIGH 75: CPT | Performed by: INTERNAL MEDICINE

## 2025-07-03 PROCEDURE — 36415 COLL VENOUS BLD VENIPUNCTURE: CPT

## 2025-07-03 PROCEDURE — 85025 COMPLETE CBC W/AUTO DIFF WBC: CPT

## 2025-07-03 PROCEDURE — APPSS180 APP SPLIT SHARED TIME > 60 MINUTES: Performed by: NURSE PRACTITIONER

## 2025-07-03 PROCEDURE — 6360000002 HC RX W HCPCS: Performed by: NURSE PRACTITIONER

## 2025-07-03 PROCEDURE — 94640 AIRWAY INHALATION TREATMENT: CPT

## 2025-07-03 PROCEDURE — 2500000003 HC RX 250 WO HCPCS

## 2025-07-03 PROCEDURE — 6370000000 HC RX 637 (ALT 250 FOR IP): Performed by: INTERNAL MEDICINE

## 2025-07-03 PROCEDURE — 6360000002 HC RX W HCPCS

## 2025-07-03 PROCEDURE — A9500 TC99M SESTAMIBI: HCPCS | Performed by: RADIOLOGY

## 2025-07-03 PROCEDURE — 78452 HT MUSCLE IMAGE SPECT MULT: CPT

## 2025-07-03 PROCEDURE — 82962 GLUCOSE BLOOD TEST: CPT

## 2025-07-03 PROCEDURE — 93017 CV STRESS TEST TRACING ONLY: CPT

## 2025-07-03 PROCEDURE — 78452 HT MUSCLE IMAGE SPECT MULT: CPT | Performed by: INTERNAL MEDICINE

## 2025-07-03 PROCEDURE — 93018 CV STRESS TEST I&R ONLY: CPT | Performed by: INTERNAL MEDICINE

## 2025-07-03 PROCEDURE — 83036 HEMOGLOBIN GLYCOSYLATED A1C: CPT

## 2025-07-03 PROCEDURE — 6370000000 HC RX 637 (ALT 250 FOR IP)

## 2025-07-03 RX ORDER — ROSUVASTATIN CALCIUM 20 MG/1
20 TABLET, COATED ORAL DAILY
Qty: 30 TABLET | Refills: 0 | Status: SHIPPED | OUTPATIENT
Start: 2025-07-03

## 2025-07-03 RX ORDER — ISOSORBIDE MONONITRATE 30 MG/1
30 TABLET, EXTENDED RELEASE ORAL DAILY
Status: DISCONTINUED | OUTPATIENT
Start: 2025-07-03 | End: 2025-07-03 | Stop reason: HOSPADM

## 2025-07-03 RX ORDER — ROSUVASTATIN CALCIUM 20 MG/1
20 TABLET, COATED ORAL DAILY
Qty: 30 TABLET | Refills: 0 | Status: SHIPPED | OUTPATIENT
Start: 2025-07-03 | End: 2025-07-03

## 2025-07-03 RX ORDER — REGADENOSON 0.08 MG/ML
0.4 INJECTION, SOLUTION INTRAVENOUS
Status: COMPLETED | OUTPATIENT
Start: 2025-07-03 | End: 2025-07-03

## 2025-07-03 RX ORDER — TETRAKIS(2-METHOXYISOBUTYLISOCYANIDE)COPPER(I) TETRAFLUOROBORATE 1 MG/ML
30 INJECTION, POWDER, LYOPHILIZED, FOR SOLUTION INTRAVENOUS
Status: COMPLETED | OUTPATIENT
Start: 2025-07-03 | End: 2025-07-03

## 2025-07-03 RX ORDER — TETRAKIS(2-METHOXYISOBUTYLISOCYANIDE)COPPER(I) TETRAFLUOROBORATE 1 MG/ML
10 INJECTION, POWDER, LYOPHILIZED, FOR SOLUTION INTRAVENOUS
Status: COMPLETED | OUTPATIENT
Start: 2025-07-03 | End: 2025-07-03

## 2025-07-03 RX ORDER — ISOSORBIDE MONONITRATE 30 MG/1
30 TABLET, EXTENDED RELEASE ORAL DAILY
Qty: 30 TABLET | Refills: 3 | Status: SHIPPED | OUTPATIENT
Start: 2025-07-03 | End: 2025-07-03

## 2025-07-03 RX ORDER — ISOSORBIDE MONONITRATE 30 MG/1
30 TABLET, EXTENDED RELEASE ORAL DAILY
Qty: 30 TABLET | Refills: 3 | Status: SHIPPED | OUTPATIENT
Start: 2025-07-03

## 2025-07-03 RX ADMIN — PANTOPRAZOLE SODIUM 40 MG: 40 TABLET, DELAYED RELEASE ORAL at 10:42

## 2025-07-03 RX ADMIN — Medication 10 MILLICURIE: at 11:18

## 2025-07-03 RX ADMIN — LEVOTHYROXINE SODIUM 125 MCG: 0.1 TABLET ORAL at 09:11

## 2025-07-03 RX ADMIN — SODIUM CHLORIDE, PRESERVATIVE FREE 10 ML: 5 INJECTION INTRAVENOUS at 10:59

## 2025-07-03 RX ADMIN — REGADENOSON 0.4 MG: 0.08 INJECTION, SOLUTION INTRAVENOUS at 12:45

## 2025-07-03 RX ADMIN — ARFORMOTEROL TARTRATE 15 MCG: 15 SOLUTION RESPIRATORY (INHALATION) at 07:39

## 2025-07-03 RX ADMIN — Medication 30 MILLICURIE: at 11:19

## 2025-07-03 RX ADMIN — IPRATROPIUM BROMIDE 0.5 MG: 0.5 SOLUTION RESPIRATORY (INHALATION) at 00:34

## 2025-07-03 RX ADMIN — BENZONATATE 100 MG: 100 CAPSULE ORAL at 16:13

## 2025-07-03 RX ADMIN — ROSUVASTATIN CALCIUM 20 MG: 20 TABLET, FILM COATED ORAL at 16:06

## 2025-07-03 RX ADMIN — ISOSORBIDE MONONITRATE 30 MG: 30 TABLET, EXTENDED RELEASE ORAL at 16:08

## 2025-07-03 RX ADMIN — BUDESONIDE 1000 MCG: 0.5 SUSPENSION RESPIRATORY (INHALATION) at 07:39

## 2025-07-03 RX ADMIN — DORZOLAMIDE HYDROCHLORIDE 1 DROP: 20 SOLUTION/ DROPS OPHTHALMIC at 16:12

## 2025-07-03 RX ADMIN — IPRATROPIUM BROMIDE 0.5 MG: 0.5 SOLUTION RESPIRATORY (INHALATION) at 07:39

## 2025-07-03 RX ADMIN — INSULIN LISPRO 1 UNITS: 100 INJECTION, SOLUTION INTRAVENOUS; SUBCUTANEOUS at 16:08

## 2025-07-03 NOTE — PROGRESS NOTES
RN message out to Luther MCGRATH re: see if patient is ok to discharge from a cardiology point of view.

## 2025-07-03 NOTE — PROGRESS NOTES
CLINICAL PHARMACY NOTE: MEDS TO BEDS    Total # of Prescriptions Filled: 2   The following medications were delivered to the patient:  Imdur er 30mg tabs  Crestor 20mg tabs    Additional Documentation:  To pt in the room

## 2025-07-03 NOTE — DISCHARGE SUMMARY
[facility] Hospitalist       Hospitalist Physician Discharge Summary       Monico Haas MD  [address] [id number] [phone]    Schedule an appointment as soon as possible for a visit in 1 week(s)  for follow up    Sowmya Haas MD  [address] [facility] [address]  [phone]    Schedule an appointment as soon as possible for a visit in 1 week(s)  for follow up in 6-8weeks      Activity level: as veronica    Diet: ADULT DIET; Regular    Dispo:home    Condition at discharge: fair        Patient ID:  Ruth Jin  [id number]  85 y.o.  1940    Admit date: 6/30/2025    Discharge date and time:  7/3/2025  3:59 PM    Admission Diagnoses: Principal Problem:    Acute hypoxic respiratory failure (HCC)  Active Problems:    Acidosis    Chronic obstructive pulmonary disease (HCC)    BENSON (dyspnea on exertion)    Coronary artery calcification  Resolved Problems:    * No resolved hospital problems. *      Discharge Diagnoses: Principal Problem:    Acute hypoxic respiratory failure (HCC)  Active Problems:    Acidosis    Chronic obstructive pulmonary disease (HCC)    BENSON (dyspnea on exertion)    Coronary artery calcification  Resolved Problems:    * No resolved hospital problems. *    BENSON  Copd   Pulm nodules  Htn  Hyperlipidemia  Hypothyroidism  Dm type 2 uncontrolled  acidosis        Consults:  IP CONSULT TO INTERNAL MEDICINE  IP CONSULT TO PULMONOLOGY  IP CONSULT TO CARDIOLOGY    Procedures: none    Hospital Course: Patient was admitted with Bronchitis [J40]  Acute hypoxic respiratory failure (HCC) [J96.01]. Patient is an 85 y.o. female with a history of stage IB adenocarcinoma of the lung s/p ESTEFANI lobectomy 2017, COPD/asthma, breast ca, HLD, hypothyroidism, DM type 2 presents with SOB.  Patient has recently been treated outpatient with three courses of antibiotics as well as four courses of steroids. Pt reports she is still having dyspnea with exertion. Had recent echo  4.32   HGB 13.7 13.7 13.4   HCT 42.0 42.2 39.6   MCV 92.9 94.8 91.7   MCH 30.3 30.8 31.0   MCHC 32.6 32.5 33.8   RDW 12.1 12.2 12.3    190 212   MPV 11.4 12.2* 11.2       Recent Labs     07/02/25  1630 07/02/25  2008 07/03/25  0637 07/03/25  1058   POCGLU 262* 185* 162* 187*       CBC with Differential:    Lab Results   Component Value Date/Time    WBC 6.0 07/03/2025 04:36 AM    RBC 4.32 07/03/2025 04:36 AM    HGB 13.4 07/03/2025 04:36 AM    HCT 39.6 07/03/2025 04:36 AM     07/03/2025 04:36 AM    MCV 91.7 07/03/2025 04:36 AM    MCH 31.0 07/03/2025 04:36 AM    MCHC 33.8 07/03/2025 04:36 AM    RDW 12.3 07/03/2025 04:36 AM    LYMPHOPCT 26 07/03/2025 04:36 AM    MONOPCT 10 07/03/2025 04:36 AM    EOSPCT 10 07/03/2025 04:36 AM    BASOPCT 1 07/03/2025 04:36 AM    MONOSABS 0.61 07/03/2025 04:36 AM    LYMPHSABS 1.54 07/03/2025 04:36 AM    EOSABS 0.59 07/03/2025 04:36 AM    BASOSABS 0.07 07/03/2025 04:36 AM     BMP:    Lab Results   Component Value Date/Time     07/03/2025 04:36 AM    K 4.1 07/03/2025 04:36 AM    K 4.5 05/26/2023 04:15 AM     07/03/2025 04:36 AM    CO2 21 07/03/2025 04:36 AM    BUN 10 07/03/2025 04:36 AM    CREATININE 0.5 07/03/2025 04:36 AM    CALCIUM 9.6 07/03/2025 04:36 AM    GFRAA >60 10/03/2022 08:51 AM    LABGLOM 89 07/03/2025 04:36 AM    LABGLOM 85 04/10/2024 02:33 PM    GLUCOSE 170 07/03/2025 04:36 AM       Imaging:   CTA PULMONARY W CONTRAST   Final Result   1. No evidence of pulmonary embolism or acute pulmonary abnormality.   2. There is some scarring in the right apex with a couple small nodular   changes largest measuring 3 mm. There are couple tiny nodular densities   posteriorly in the left lower lobe. There is some mild thickening long the   bronchi in the right lower lobe. Some of the bronchi are occluded. This could   be related to bronchitis.             Patient Instructions:      Medication List        START taking these medications      isosorbide mononitrate 30  MG extended release tablet  Commonly known as: IMDUR  Take 1 tablet by mouth daily            CHANGE how you take these medications      albuterol (2.5 MG/3ML) 0.083% nebulizer solution  Commonly known as: PROVENTIL  What changed: Another medication with the same name was removed. Continue taking this medication, and follow the directions you see here.     levothyroxine 125 MCG tablet  Commonly known as: SYNTHROID  What changed: Another medication with the same name was removed. Continue taking this medication, and follow the directions you see here.            CONTINUE taking these medications      acetaminophen 325 MG tablet  Commonly known as: TYLENOL     aluminum & magnesium hydroxide-simethicone 400-400-40 MG/5ML Susp  Commonly known as: MYLANTA     bisacodyl 5 MG EC tablet  Commonly known as: DULCOLAX     cetirizine 10 MG tablet  Commonly known as: ZYRTEC     dorzolamide 2 % ophthalmic solution  Commonly known as: TRUSOPT     Handicap Placard Misc  by Does not apply route     hydrOXYzine HCl 25 MG tablet  Commonly known as: ATARAX     latanoprost 0.005 % ophthalmic solution  Commonly known as: XALATAN     loperamide 2 MG capsule  Commonly known as: IMODIUM     magnesium hydroxide 400 MG/5ML suspension  Commonly known as: MILK OF MAGNESIA     metFORMIN 500 MG tablet  Commonly known as: GLUCOPHAGE     omeprazole 20 MG delayed release capsule  Commonly known as: PRILOSEC     rosuvastatin 20 MG tablet  Commonly known as: CRESTOR  Take 1 tablet by mouth daily     Tessalon Perles 100 MG capsule  Generic drug: benzonatate     Trelegy Ellipta 200-62.5-25 MCG/ACT Aepb inhaler  Generic drug: fluticasone-umeclidin-vilant  Inhale 1 puff into the lungs daily     triamcinolone 55 MCG/ACT nasal inhaler  Commonly known as: NASACORT     Vitamin D3 1.25 MG (23411 UT) Caps            STOP taking these medications      predniSONE 20 MG tablet  Commonly known as: DELTASONE     simvastatin 40 MG tablet  Commonly known as: ZOCOR

## 2025-07-03 NOTE — PLAN OF CARE
Problem: ABCDS Injury Assessment  Goal: Absence of physical injury  Outcome: Progressing  Flowsheets (Taken 7/2/2025 0900 by Cherri Wilson, RN)  Absence of Physical Injury: Implement safety measures based on patient assessment     Problem: Discharge Planning  Goal: Discharge to home or other facility with appropriate resources  Outcome: Progressing  Flowsheets (Taken 7/2/2025 0740 by Cherri Wilson, RN)  Discharge to home or other facility with appropriate resources:   Identify barriers to discharge with patient and caregiver   Arrange for needed discharge resources and transportation as appropriate   Identify discharge learning needs (meds, wound care, etc)   Arrange for interpreters to assist at discharge as needed   Refer to discharge planning if patient needs post-hospital services based on physician order or complex needs related to functional status, cognitive ability or social support system     Problem: Chronic Conditions and Co-morbidities  Goal: Patient's chronic conditions and co-morbidity symptoms are monitored and maintained or improved  Outcome: Progressing  Flowsheets (Taken 7/2/2025 0740 by Cherri Wilson, RN)  Care Plan - Patient's Chronic Conditions and Co-Morbidity Symptoms are Monitored and Maintained or Improved:   Monitor and assess patient's chronic conditions and comorbid symptoms for stability, deterioration, or improvement   Collaborate with multidisciplinary team to address chronic and comorbid conditions and prevent exacerbation or deterioration   Update acute care plan with appropriate goals if chronic or comorbid symptoms are exacerbated and prevent overall improvement and discharge     Problem: Pain  Goal: Verbalizes/displays adequate comfort level or baseline comfort level  Outcome: Progressing     Problem: Safety - Adult  Goal: Free from fall injury  Outcome: Progressing     Problem: Skin/Tissue Integrity  Goal: Skin integrity remains intact  Description: 1.  Monitor

## 2025-07-03 NOTE — CARE COORDINATION
Social Work/Discharge Planning:  Patient NPO for a stress test.  Notified Nelia with [facility] (ph: [phone]) of possible discharge.  Nelia is requesting for facility to be notified is patient discharges.  Met with patient and confirmed her family will transport her home if she is discharged.  Updated charge nurse. Electronically signed by STACI Schaffer on 7/3/2025 at 10:44 AM

## 2025-07-10 ENCOUNTER — TELEPHONE (OUTPATIENT)
Dept: FAMILY MEDICINE CLINIC | Age: 85
End: 2025-07-10

## 2025-08-01 NOTE — PROGRESS NOTES
Physician Progress Note      PATIENT:               KELVIN GE  [facility] #:                  [id number]  :                       1940  ADMIT DATE:       2025 10:38 AM  DISCH DATE:        7/3/2025 6:17 PM  RESPONDING  PROVIDER #:        Ciaran Degroot MD          QUERY TEXT:    Acute respiratory failure is documented in the medical record per [facility] by Emy Ni, APRN - CNP, signed by Kady Correa MD. Please provide   additional clinical indicators supportive of your documentation. Or please   document if the diagnosis of acute respiratory failure has been ruled out   after study.    The clinical indicators include:  -Hx- adenocarcinoma of the lung status post left upper lobe lobectomy 2017,   COPD, asthma, breast cancer, hypothyroidism, diabetes mellitus type 2.  -Clinical Indicators-[facility] \"Chronic obstructive pulmonary disease without acute   exacerbation. Recent outpatient echo was reported to be WNL, unable to access   echo. ED imaging: CTA pulmonary negative for PE, did show scarring in right   apex with small nodular changes largest measuring 3 mm, tiny nodular densities   posteriorly in left lower lobe, mild thickening along bronchi in right lower   lobe. Pertinent abnormal labs: Blood glucose 213, BUN 7, calcium 10.5, ALT   49.RR 18, 99%. General appearance:  in no acute distress. Anxious. Chronic   hypercapnic respiratory failure- on home NIV  -Tx-consults, monitoring, labs, CTA, Ativan, 500cc bolus 0.9 NS,  Brovana and   Pulmicort, Trelegy, albuterol, zyrtec  Options provided:  -- Acute Respiratory Failure as evidenced by, Please document evidence.  -- Acute Respiratory Failure ruled out after study  -- Acute Respiratory Failure ruled out after study and Chronic Respiratory   Failure confirmed  -- Other - I will add my own diagnosis  -- Disagree - Not applicable / Not valid  -- Disagree - Clinically unable to determine / Unknown  -- Refer to Clinical Documentation Reviewer    PROVIDER

## (undated) DEVICE — Device: Brand: MEDEX

## (undated) DEVICE — SURGICAL PROCEDURE PACK BRONCH

## (undated) DEVICE — SYRINGE MED 50ML LUERLOCK TIP

## (undated) DEVICE — MASK RESP UNIV N95 4 PANEL HD STRP INDIVIDUALLY WRP LF

## (undated) DEVICE — SOLUTION IRRIG 1000ML 0.9% SOD CHL USP POUR PLAS BTL

## (undated) DEVICE — SOLUTION IV IRRIG 500ML 0.9% SODIUM CHL 2F7123